# Patient Record
Sex: FEMALE | Race: WHITE | NOT HISPANIC OR LATINO | Employment: OTHER | URBAN - METROPOLITAN AREA
[De-identification: names, ages, dates, MRNs, and addresses within clinical notes are randomized per-mention and may not be internally consistent; named-entity substitution may affect disease eponyms.]

---

## 2017-02-14 ENCOUNTER — ALLSCRIPTS OFFICE VISIT (OUTPATIENT)
Dept: OTHER | Facility: OTHER | Age: 82
End: 2017-02-14

## 2017-02-14 DIAGNOSIS — R29.6 REPEATED FALLS: ICD-10-CM

## 2017-02-14 DIAGNOSIS — R26.2 DIFFICULTY IN WALKING, NOT ELSEWHERE CLASSIFIED: ICD-10-CM

## 2017-02-28 ENCOUNTER — GENERIC CONVERSION - ENCOUNTER (OUTPATIENT)
Dept: OTHER | Facility: OTHER | Age: 82
End: 2017-02-28

## 2017-03-22 ENCOUNTER — GENERIC CONVERSION - ENCOUNTER (OUTPATIENT)
Dept: OTHER | Facility: OTHER | Age: 82
End: 2017-03-22

## 2017-04-05 ENCOUNTER — ALLSCRIPTS OFFICE VISIT (OUTPATIENT)
Dept: OTHER | Facility: OTHER | Age: 82
End: 2017-04-05

## 2017-04-21 ENCOUNTER — GENERIC CONVERSION - ENCOUNTER (OUTPATIENT)
Dept: OTHER | Facility: OTHER | Age: 82
End: 2017-04-21

## 2017-07-12 ENCOUNTER — ALLSCRIPTS OFFICE VISIT (OUTPATIENT)
Dept: OTHER | Facility: OTHER | Age: 82
End: 2017-07-12

## 2017-08-22 ENCOUNTER — ALLSCRIPTS OFFICE VISIT (OUTPATIENT)
Dept: OTHER | Facility: OTHER | Age: 82
End: 2017-08-22

## 2017-10-24 ENCOUNTER — ALLSCRIPTS OFFICE VISIT (OUTPATIENT)
Dept: OTHER | Facility: OTHER | Age: 82
End: 2017-10-24

## 2017-11-25 ENCOUNTER — APPOINTMENT (EMERGENCY)
Dept: RADIOLOGY | Facility: HOSPITAL | Age: 82
End: 2017-11-25
Payer: MEDICARE

## 2017-11-25 ENCOUNTER — HOSPITAL ENCOUNTER (EMERGENCY)
Facility: HOSPITAL | Age: 82
Discharge: HOME | End: 2017-11-25
Attending: EMERGENCY MEDICINE | Admitting: EMERGENCY MEDICINE
Payer: MEDICARE

## 2017-11-25 ENCOUNTER — APPOINTMENT (EMERGENCY)
Dept: CT IMAGING | Facility: HOSPITAL | Age: 82
End: 2017-11-25
Payer: MEDICARE

## 2017-11-25 VITALS
HEART RATE: 66 BPM | OXYGEN SATURATION: 97 % | RESPIRATION RATE: 16 BRPM | TEMPERATURE: 98.3 F | DIASTOLIC BLOOD PRESSURE: 63 MMHG | SYSTOLIC BLOOD PRESSURE: 129 MMHG

## 2017-11-25 DIAGNOSIS — T14.8XXA BRUISE: ICD-10-CM
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
## 2017-11-25 DIAGNOSIS — S09.93XA FACIAL INJURY, INITIAL ENCOUNTER: ICD-10-CM

## 2017-11-25 LAB
ALBUMIN SERPL BCP-MCNC: 3.1 G/DL (ref 3.5–5)
ALP SERPL-CCNC: 169 U/L (ref 46–116)
ALT SERPL W P-5'-P-CCNC: 46 U/L (ref 12–78)
ANION GAP BLD CALC-SCNC: 13 MMOL/L (ref 4–13)
ANION GAP SERPL CALCULATED.3IONS-SCNC: 9 MMOL/L (ref 4–13)
AST SERPL W P-5'-P-CCNC: 51 U/L (ref 5–45)
BACTERIA UR QL AUTO: ABNORMAL /HPF
BASOPHILS # BLD AUTO: 0.02 THOUSANDS/ΜL (ref 0–0.1)
BASOPHILS NFR BLD AUTO: 0 % (ref 0–1)
BILIRUB SERPL-MCNC: 0.8 MG/DL (ref 0.2–1)
BILIRUB UR QL STRIP: NEGATIVE
BUN BLD-MCNC: 25 MG/DL (ref 5–25)
BUN SERPL-MCNC: 22 MG/DL (ref 5–25)
CA-I BLD-SCNC: 1.32 MMOL/L (ref 1.12–1.32)
CALCIUM SERPL-MCNC: 10 MG/DL (ref 8.3–10.1)
CHLORIDE BLD-SCNC: 109 MMOL/L (ref 100–108)
CHLORIDE SERPL-SCNC: 108 MMOL/L (ref 100–108)
CK MB SERPL-MCNC: 1.6 % (ref 0–2.5)
CK MB SERPL-MCNC: 5.3 NG/ML (ref 0–5)
CK SERPL-CCNC: 324 U/L (ref 26–192)
CLARITY UR: CLEAR
CO2 SERPL-SCNC: 23 MMOL/L (ref 21–32)
COLOR UR: YELLOW
CREAT BLD-MCNC: 1.4 MG/DL (ref 0.6–1.3)
CREAT SERPL-MCNC: 1.5 MG/DL (ref 0.6–1.3)
EOSINOPHIL # BLD AUTO: 0.04 THOUSAND/ΜL (ref 0–0.61)
EOSINOPHIL NFR BLD AUTO: 1 % (ref 0–6)
ERYTHROCYTE [DISTWIDTH] IN BLOOD BY AUTOMATED COUNT: 12.3 % (ref 11.6–15.1)
GFR SERPL CREATININE-BSD FRML MDRD: 32 ML/MIN/1.73SQ M
GFR SERPL CREATININE-BSD FRML MDRD: 35 ML/MIN/1.73SQ M
GLUCOSE SERPL-MCNC: 85 MG/DL (ref 65–140)
GLUCOSE SERPL-MCNC: 89 MG/DL (ref 65–140)
GLUCOSE UR STRIP-MCNC: NEGATIVE MG/DL
HCT VFR BLD AUTO: 41.7 % (ref 34.8–46.1)
HCT VFR BLD CALC: 44 % (ref 34.8–46.1)
HGB BLD-MCNC: 13.6 G/DL (ref 11.5–15.4)
HGB BLDA-MCNC: 15 G/DL (ref 11.5–15.4)
HGB UR QL STRIP.AUTO: NEGATIVE
KETONES UR STRIP-MCNC: ABNORMAL MG/DL
LEUKOCYTE ESTERASE UR QL STRIP: ABNORMAL
LYMPHOCYTES # BLD AUTO: 1.06 THOUSANDS/ΜL (ref 0.6–4.47)
LYMPHOCYTES NFR BLD AUTO: 15 % (ref 14–44)
MCH RBC QN AUTO: 32.1 PG (ref 26.8–34.3)
MCHC RBC AUTO-ENTMCNC: 32.6 G/DL (ref 31.4–37.4)
MCV RBC AUTO: 98 FL (ref 82–98)
MONOCYTES # BLD AUTO: 0.83 THOUSAND/ΜL (ref 0.17–1.22)
MONOCYTES NFR BLD AUTO: 12 % (ref 4–12)
NEUTROPHILS # BLD AUTO: 5.18 THOUSANDS/ΜL (ref 1.85–7.62)
NEUTS SEG NFR BLD AUTO: 72 % (ref 43–75)
NITRITE UR QL STRIP: NEGATIVE
NON-SQ EPI CELLS URNS QL MICRO: ABNORMAL /HPF
OTHER STN SPEC: ABNORMAL
PCO2 BLD: 23 MMOL/L (ref 21–32)
PH UR STRIP.AUTO: 7 [PH] (ref 4.5–8)
PLATELET # BLD AUTO: 191 THOUSANDS/UL (ref 149–390)
PMV BLD AUTO: 10.5 FL (ref 8.9–12.7)
POTASSIUM BLD-SCNC: 4.7 MMOL/L (ref 3.5–5.3)
POTASSIUM SERPL-SCNC: 5.1 MMOL/L (ref 3.5–5.3)
PROT SERPL-MCNC: 6.3 G/DL (ref 6.4–8.2)
PROT UR STRIP-MCNC: ABNORMAL MG/DL
RBC # BLD AUTO: 4.24 MILLION/UL (ref 3.81–5.12)
RBC #/AREA URNS AUTO: ABNORMAL /HPF
SODIUM BLD-SCNC: 139 MMOL/L (ref 136–145)
SODIUM SERPL-SCNC: 140 MMOL/L (ref 136–145)
SP GR UR STRIP.AUTO: 1.01 (ref 1–1.03)
SPECIMEN SOURCE: ABNORMAL
TROPONIN I SERPL-MCNC: <0.02 NG/ML
UROBILINOGEN UR QL STRIP.AUTO: 0.2 E.U./DL
WBC # BLD AUTO: 7.13 THOUSAND/UL (ref 4.31–10.16)
WBC #/AREA URNS AUTO: ABNORMAL /HPF

## 2017-11-25 PROCEDURE — 70450 CT HEAD/BRAIN W/O DYE: CPT

## 2017-11-25 PROCEDURE — 80047 BASIC METABLC PNL IONIZED CA: CPT

## 2017-11-25 PROCEDURE — 73030 X-RAY EXAM OF SHOULDER: CPT

## 2017-11-25 PROCEDURE — 71250 CT THORAX DX C-: CPT

## 2017-11-25 PROCEDURE — 82550 ASSAY OF CK (CPK): CPT | Performed by: EMERGENCY MEDICINE

## 2017-11-25 PROCEDURE — 72125 CT NECK SPINE W/O DYE: CPT

## 2017-11-25 PROCEDURE — 73060 X-RAY EXAM OF HUMERUS: CPT

## 2017-11-25 PROCEDURE — 80053 COMPREHEN METABOLIC PANEL: CPT | Performed by: EMERGENCY MEDICINE

## 2017-11-25 PROCEDURE — 99284 EMERGENCY DEPT VISIT MOD MDM: CPT

## 2017-11-25 PROCEDURE — 81001 URINALYSIS AUTO W/SCOPE: CPT | Performed by: EMERGENCY MEDICINE

## 2017-11-25 PROCEDURE — 73590 X-RAY EXAM OF LOWER LEG: CPT

## 2017-11-25 PROCEDURE — 84484 ASSAY OF TROPONIN QUANT: CPT | Performed by: EMERGENCY MEDICINE

## 2017-11-25 PROCEDURE — 70486 CT MAXILLOFACIAL W/O DYE: CPT

## 2017-11-25 PROCEDURE — 36415 COLL VENOUS BLD VENIPUNCTURE: CPT | Performed by: EMERGENCY MEDICINE

## 2017-11-25 PROCEDURE — 85014 HEMATOCRIT: CPT

## 2017-11-25 PROCEDURE — 85025 COMPLETE CBC W/AUTO DIFF WBC: CPT | Performed by: EMERGENCY MEDICINE

## 2017-11-25 PROCEDURE — 93005 ELECTROCARDIOGRAM TRACING: CPT | Performed by: EMERGENCY MEDICINE

## 2017-11-25 PROCEDURE — 74176 CT ABD & PELVIS W/O CONTRAST: CPT

## 2017-11-25 PROCEDURE — 82553 CREATINE MB FRACTION: CPT | Performed by: EMERGENCY MEDICINE

## 2017-11-25 RX ORDER — TRAMADOL HYDROCHLORIDE 50 MG/1
25 TABLET ORAL EVERY 6 HOURS PRN
COMMUNITY
End: 2018-02-09 | Stop reason: SDUPTHER

## 2017-11-25 RX ORDER — ZINC OXIDE
OINTMENT (GRAM) TOPICAL AS NEEDED
COMMUNITY
End: 2018-02-09 | Stop reason: SDUPTHER

## 2017-11-25 RX ORDER — ACETAMINOPHEN 325 MG/1
650 TABLET ORAL 2 TIMES DAILY PRN
COMMUNITY
End: 2018-02-09 | Stop reason: SDUPTHER

## 2017-11-25 RX ORDER — LEVOTHYROXINE SODIUM 0.03 MG/1
25 TABLET ORAL DAILY
COMMUNITY
End: 2018-02-09 | Stop reason: SDUPTHER

## 2017-11-25 NOTE — ED PROVIDER NOTES
History  Chief Complaint   Patient presents with    Fall     pt fell yesterday  pt landed on her left side  pt does not remember much of the fall  thinks she may have passed out  pt only remembers trying to get up  pt has head injury, facial injury, left shoulder pain, left rib pain and left hip pain       History provided by:  Patient   used: No    Fall   Associated symptoms: back pain and chest pain    Associated symptoms: no abdominal pain, no headaches, no nausea, no neck pain and no vomiting      Patient is a 51-year-old female presenting to emergency department with trauma  She fell yesterday  Does not remember the fall  Does him getting up  Is unsure what happened  Denies fevers or chills  No nausea or vomiting  Has a headache  Has back pain  Has left-sided chest wall pain  Worse with deep breaths  No abdominal pain  MDM cardiac workup, check urine, trauma scans, re-evaluate        Prior to Admission Medications   Prescriptions Last Dose Informant Patient Reported? Taking? Multiple Vitamin (MULTIVITAMIN) tablet Unknown at Unknown time Child Yes No   Sig: Take 1 tablet by mouth daily  acetaminophen (TYLENOL) 325 mg tablet Unknown at Unknown time Child Yes No   Sig: Take 650 mg by mouth 2 (two) times a day as needed for mild pain   calcium carbonate (OS-CHLOÉ) 600 MG tablet Unknown at Unknown time Child Yes No   Sig: Take 600 mg by mouth daily  calcium-vitamin D (OSCAL 500 + D) 500 mg-200 units per tablet Unknown at Unknown time Child Yes No   Sig: Take 1 tablet by mouth daily  docusate sodium (COLACE) 100 mg capsule Unknown at Unknown time Child Yes No   Sig: Take 100 mg by mouth daily  ferrous sulfate 325 (65 Fe) mg tablet Unknown at Unknown time Child Yes No   Sig: Take 325 mg by mouth daily with breakfast    folic acid (FOLVITE) 738 mcg tablet Unknown at Unknown time Child Yes No   Sig: Take 400 mcg by mouth daily     levothyroxine 25 mcg tablet Unknown at Unknown time Child Yes No   Sig: Take 25 mcg by mouth daily   liver oil-zinc oxide (DESITIN) 40 % ointment Unknown at Unknown time Child Yes No   Sig: Apply topically as needed for irritation   magnesium hydroxide (MILK OF MAGNESIA) 400 mg/5 mL oral suspension Unknown at Unknown time Child Yes No   Sig: Take 30 mL by mouth daily   mirtazapine (REMERON) 15 mg tablet Unknown at Unknown time Child Yes No   Sig: Take 15 mg by mouth daily at bedtime  potassium chloride (K-DUR) 10 mEq tablet Unknown at Unknown time Child Yes No   Sig: Take 10 mEq by mouth 2 (two) times a day  rOPINIRole (REQUIP) 2 mg tablet Unknown at Unknown time Child Yes No   Sig: Take 2 mg by mouth daily at bedtime  silver sulfadiazine (SILVADENE,SSD) 1 % cream Unknown at Unknown time Child Yes No   Sig: Apply topically daily   traMADol (ULTRAM) 50 mg tablet Unknown at Unknown time Child Yes No   Sig: Take 25 mg by mouth every 6 (six) hours as needed for moderate pain   traZODone (DESYREL) 50 mg tablet Unknown at Unknown time Child Yes No   Sig: Take 25 mg by mouth daily at bedtime  Facility-Administered Medications: None       Past Medical History:   Diagnosis Date    Disease of thyroid gland        History reviewed  No pertinent surgical history  History reviewed  No pertinent family history  I have reviewed and agree with the history as documented  Social History   Substance Use Topics    Smoking status: Current Every Day Smoker     Packs/day: 0 20     Types: Cigarettes    Smokeless tobacco: Never Used    Alcohol use 4 2 oz/week     7 Shots of liquor per week        Review of Systems   Constitutional: Negative for chills, diaphoresis and fever  HENT: Negative for congestion and sore throat  Eyes: Negative for photophobia and visual disturbance  Respiratory: Negative for cough, shortness of breath, wheezing and stridor  Cardiovascular: Positive for chest pain  Negative for palpitations and leg swelling     Gastrointestinal: Negative for abdominal pain, blood in stool, diarrhea, nausea and vomiting  Genitourinary: Negative for dysuria, frequency and urgency  Musculoskeletal: Positive for back pain  Negative for neck pain and neck stiffness  Skin: Negative for pallor and rash  Neurological: Negative for dizziness, syncope, weakness, light-headedness and headaches  All other systems reviewed and are negative  Physical Exam  ED Triage Vitals   Temperature Pulse Respirations Blood Pressure SpO2   11/25/17 1439 11/25/17 1438 11/25/17 1438 11/25/17 1438 11/25/17 1438   98 3 °F (36 8 °C) 75 18 121/60 96 %      Temp Source Heart Rate Source Patient Position - Orthostatic VS BP Location FiO2 (%)   11/25/17 1438 11/25/17 1438 11/25/17 1438 11/25/17 1438 --   Oral Monitor Sitting Left arm       Pain Score       11/25/17 1438       Worst Possible Pain           Orthostatic Vital Signs  Vitals:    11/25/17 1438 11/25/17 1530 11/25/17 1628 11/25/17 1630   BP: 121/60 118/56 154/66 129/63   Pulse: 75 66 (!) 110 66   Patient Position - Orthostatic VS: Sitting  Lying        Physical Exam   Constitutional: She is oriented to person, place, and time  She appears well-developed and well-nourished  HENT:   Head: Normocephalic and atraumatic  Eyes: EOM are normal  Pupils are equal, round, and reactive to light  Neck: Normal range of motion  Neck supple  Cardiovascular: Normal rate, regular rhythm, normal heart sounds and intact distal pulses  Pulmonary/Chest: Effort normal and breath sounds normal  No respiratory distress  Abdominal: Soft  Bowel sounds are normal  There is no tenderness  Musculoskeletal:   Full range of motion of all joints, tenderness and bruising over bilateral shins and left shoulder, CT L-spine tenderness, left chest wall tenderness   Neurological: She is alert and oriented to person, place, and time  No cranial nerve deficit or sensory deficit  She exhibits normal muscle tone   Coordination normal    Skin: Skin is warm and dry  Capillary refill takes less than 2 seconds  No rash noted  No erythema  Vitals reviewed        ED Medications  Medications - No data to display    Diagnostic Studies  Results Reviewed     Procedure Component Value Units Date/Time    Urine Microscopic [[id number]]  (Abnormal) Collected:  11/25/17 1701    Lab Status:  Final result Specimen:  Urine from Urine, Clean Catch Updated:  11/25/17 1730     RBC, UA 0-1 (A) /hpf      WBC, UA 0-5 /hpf      Epithelial Cells Occasional /hpf      Bacteria, UA Occasional /hpf      OTHER OBSERVATIONS Transitional Epithelial Cells    UA w Reflex to Microscopic w Reflex to Culture [[id number]]  (Abnormal) Collected:  11/25/17 1701    Lab Status:  Final result Specimen:  Urine from Urine, Clean Catch Updated:  11/25/17 1709     Color, UA Yellow     Clarity, UA Clear     Specific Gravity, UA 1 010     pH, UA 7 0     Leukocytes, UA Trace (A)     Nitrite, UA Negative     Protein, UA Trace (A) mg/dl      Glucose, UA Negative mg/dl      Ketones, UA Trace (A) mg/dl      Urobilinogen, UA 0 2 E U /dl      Bilirubin, UA Negative     Blood, UA Negative    CKMB [[id number]]  (Abnormal) Collected:  11/25/17 1517    Lab Status:  Final result Specimen:  Blood from Arm, Left Updated:  11/25/17 1625     CK-MB Index 1 6 %      CK-MB FRACTION 5 3 (H) ng/mL     CK (with reflex to MB) [[id number]]  (Abnormal) Collected:  11/25/17 1517    Lab Status:  Final result Specimen:  Blood from Arm, Left Updated:  11/25/17 1623     Total  (H) U/L     Comprehensive metabolic panel [[id number]]  (Abnormal) Collected:  11/25/17 1517    Lab Status:  Final result Specimen:  Blood from Arm, Left Updated:  11/25/17 1542     Sodium 140 mmol/L      Potassium 5 1 mmol/L      Chloride 108 mmol/L      CO2 23 mmol/L      Anion Gap 9 mmol/L      BUN 22 mg/dL      Creatinine 1 50 (H) mg/dL      Glucose 89 mg/dL      Calcium 10 0 mg/dL      AST 51 (H) U/L      ALT 46 U/L      Alkaline Phosphatase 169 (H) U/L Total Protein 6 3 (L) g/dL      Albumin 3 1 (L) g/dL      Total Bilirubin 0 80 mg/dL      eGFR 32 ml/min/1 73sq m     Narrative:         National Kidney Disease Education Program recommendations are as follows:  GFR calculation is accurate only with a steady state creatinine  Chronic Kidney disease less than 60 ml/min/1 73 sq  meters  Kidney failure less than 15 ml/min/1 73 sq  meters  Troponin I [[id number]]  (Normal) Collected:  11/25/17 1517    Lab Status:  Final result Specimen:  Blood from Arm, Left Updated:  11/25/17 1542     Troponin I <0 02 ng/mL     Narrative:         [employer] Chemistry analyzer 99% cutoff is > 0 04 ng/mL in network labs    o cTnI 99% cutoff is useful only when applied to patients in the clinical setting of myocardial ischemia  o cTnI 99% cutoff should be interpreted in the context of clinical history, ECG findings and possibly cardiac imaging to establish correct diagnosis  o cTnI 99% cutoff may be suggestive but clearly not indicative of a coronary event without the clinical setting of myocardial ischemia      CBC and differential [[id number]]  (Normal) Collected:  11/25/17 1517    Lab Status:  Final result Specimen:  Blood from Arm, Left Updated:  11/25/17 1527     WBC 7 13 Thousand/uL      RBC 4 24 Million/uL      Hemoglobin 13 6 g/dL      Hematocrit 41 7 %      MCV 98 fL      MCH 32 1 pg      MCHC 32 6 g/dL      RDW 12 3 %      MPV 10 5 fL      Platelets 894 Thousands/uL      Neutrophils Relative 72 %      Lymphocytes Relative 15 %      Monocytes Relative 12 %      Eosinophils Relative 1 %      Basophils Relative 0 %      Neutrophils Absolute 5 18 Thousands/µL      Lymphocytes Absolute 1 06 Thousands/µL      Monocytes Absolute 0 83 Thousand/µL      Eosinophils Absolute 0 04 Thousand/µL      Basophils Absolute 0 02 Thousands/µL     POCT Chem 8+ [[id number]]  (Abnormal) Collected:  11/25/17 1524    Lab Status:  Final result Updated:  11/25/17 1527     SODIUM, I-STAT 139 mmol/l Potassium, i-STAT 4 7 mmol/L      Chloride, istat 109 (H) mmol/L      CO2, i-STAT 23 mmol/L      Anion Gap, Istat 13 mmol/L      Calcium, Ionized i-STAT 1 32 mmol/L      BUN, I-STAT 25 mg/dl      Creatinine, i-STAT 1 4 (H) mg/dl      eGFR 35 ml/min/1 73sq m      Glucose, i-STAT 85 mg/dl      Hct, i-STAT 44 %      Hgb, i-STAT 15 0 g/dl      Specimen Type VENOUS                 CT chest abdomen pelvis wo contrast   Final Result by Faizan Hernandez MD (11/25 1630)      No evidence for an acute thoracic, abdominal, or pelvic injury  No focal airspace consolidation  Sequela prior trauma with chronic sacral fractures and remote healing rib fractures  Bilateral medullary nephrocalcinosis  Workstation performed: [id number]         CT cervical spine without contrast   Final Result by Faizan Hernandez MD (11/25 1621)      No acute fracture or traumatic listhesis  Moderate cervical spondylosis  Workstation performed: [id number]         CT facial bones without contrast   Final Result by Faizan Hernandez MD (11/25 1618)      No acute maxillofacial fracture  Workstation performed: [id number]         CT head without contrast   Final Result by Faizan Hernandez MD (11/25 1615)      No acute intracranial abnormality  Moderate generalized volume loss with chronic microvascular ischemia  Workstation performed: [id number]         XR shoulder 2+ views LEFT    (Results Pending)   XR humerus LEFT    (Results Pending)   XR tibia fibula 2 views LEFT    (Results Pending)   XR tibia fibula 2 views RIGHT    (Results Pending)        x-rays evaluated independently by me    No acute fractures noted on my evaluation      Procedures  Procedures       Phone Contacts  ED Phone Contact    ED Course  ED Course as of Nov 25 2333   Sat Nov 25, 2017   1515 ECG shows rate of 67, normal sinus, normal QRS, no significant ST or T-wave changes, normal intervals, EKG interpreted independently by me                                MDM  CritCare Time    Disposition  Final diagnoses:   Fall, initial encounter   Facial injury, initial encounter   Bruise     Time reflects when diagnosis was documented in both MDM as applicable and the Disposition within this note     Time User Action Codes Description Comment    11/25/2017  5:37 PM Cristiane Pacheco Add [C76  [facility]] Fall, initial encounter     11/25/2017  5:37 PM Cristiane Pacheco Add [[id number]] Facial injury, initial encounter     11/25/2017  5:37 PM Cristiane Pacheco Add [S47  [facility]       ED Disposition     ED Disposition Condition Comment    Discharge  Wilber Yap discharge to home/self care  Condition at discharge: Good        Follow-up Information     Follow up With Specialties Details Why Contact Info Additional Information    Libia Carcamo MD Family Medicine In 3 days  [address] [place] [address] [phone]       [address] Emergency Department Emergency Medicine  As needed, If symptoms worsen [address] [address] [phone] [facility], [address], [place], [place], [address]        Discharge Medication List as of 11/25/2017  5:38 PM      CONTINUE these medications which have NOT CHANGED    Details   acetaminophen (TYLENOL) 325 mg tablet Take 650 mg by mouth 2 (two) times a day as needed for mild pain, Historical Med      calcium carbonate (OS-CHLOÉ) 600 MG tablet Take 600 mg by mouth daily  , Historical Med      calcium-vitamin D (OSCAL 500 + D) 500 mg-200 units per tablet Take 1 tablet by mouth daily  , Historical Med      docusate sodium (COLACE) 100 mg capsule Take 100 mg by mouth daily  , Historical Med      ferrous sulfate 325 (65 Fe) mg tablet Take 325 mg by mouth daily with breakfast , Historical Med      folic acid (FOLVITE) 927 mcg tablet Take 400 mcg by mouth daily  , Historical Med      levothyroxine 25 mcg tablet Take 25 mcg by mouth daily, Historical Med      liver oil-zinc oxide (DESITIN) 40 % ointment Apply topically as needed for irritation, Historical Med      magnesium hydroxide (MILK OF MAGNESIA) 400 mg/5 mL oral suspension Take 30 mL by mouth daily, Historical Med      mirtazapine (REMERON) 15 mg tablet Take 15 mg by mouth daily at bedtime  , Historical Med      Multiple Vitamin (MULTIVITAMIN) tablet Take 1 tablet by mouth daily  , Historical Med      potassium chloride (K-DUR) 10 mEq tablet Take 10 mEq by mouth 2 (two) times a day , Historical Med      rOPINIRole (REQUIP) 2 mg tablet Take 2 mg by mouth daily at bedtime  , Historical Med      silver sulfadiazine (SILVADENE,SSD) 1 % cream Apply topically daily, Historical Med      traMADol (ULTRAM) 50 mg tablet Take 25 mg by mouth every 6 (six) hours as needed for moderate pain, Historical Med      traZODone (DESYREL) 50 mg tablet Take 25 mg by mouth daily at bedtime  , Historical Med           No discharge procedures on file      ED Provider  Electronically Signed by           Shanna Covington MD  11/25/17 0571

## 2017-11-25 NOTE — ED NOTES
Pt given juice and cheese to eat, ok'ed by provider  Pt awaiting UA results       Tuyet Damon RN  11/25/17 5447

## 2017-11-25 NOTE — ED NOTES
Pt aware urine sample is needed and will provide specimen when able         Marley Camejo RN  11/25/17 9488

## 2017-11-25 NOTE — DISCHARGE INSTRUCTIONS
Please see use Tylenol for pain  Fall Prevention   WHAT YOU NEED TO KNOW:   Fall prevention includes ways to make your home and other areas safer  It also includes ways you can move more carefully to prevent a fall  Health conditions that cause changes in your blood pressure, vision, or muscle strength and coordination may increase your risk for falls  Medicines may also increase your risk for falls if they make you dizzy, weak, or sleepy  DISCHARGE INSTRUCTIONS:   Call 911 or have someone else call if:   · You have fallen and are unconscious  · You have fallen and cannot move part of your body  Contact your healthcare provider if:   · You have fallen and have pain or a headache  · You have questions or concerns about your condition or care  Fall prevention tips:   · Stand or sit up slowly  This may help you keep your balance and prevent falls  · Use assistive devices as directed  Your healthcare provider may suggest that you use a cane or walker to help you keep your balance  You may need to have grab bars put in your bathroom near the toilet or in the shower  · Wear shoes that fit well and have soles that   Wear shoes both inside and outside  Use slippers with good   Do not wear shoes with high heels  · Wear a personal alarm  This is a device that allows you to call 911 if you fall and need help  Ask your healthcare provider for more information  · Stay active  Exercise can help strengthen your muscles and improve your balance  Your healthcare provider may recommend water aerobics or walking  He or she may also recommend physical therapy to improve your coordination  Never start an exercise program without talking to your healthcare provider first      · Manage your medical conditions  Keep all appointments with your healthcare providers  Visit your eye doctor as directed  Home safety tips:   · Add items to prevent falls in the bathroom    Put nonslip strips on your bath or shower floor to prevent you from slipping  Use a bath mat if you do not have carpet in the bathroom  This will prevent you from falling when you step out of the bath or shower  Use a shower seat so you do not need to stand while you shower  Sit on the toilet or a chair in your bathroom to dry yourself and put on clothing  This will prevent you from losing your balance from drying or dressing yourself while you are standing  · Keep paths clear  Remove books, shoes, and other objects from walkways and stairs  Place cords for telephones and lamps out of the way so that you do not need to walk over them  Tape them down if you cannot move them  Remove small rugs  If you cannot remove a rug, secure it with double-sided tape  This will prevent you from tripping  · Install bright lights in your home  Use night lights to help light paths to the bathroom or kitchen  Always turn on the light before you start walking  · Keep items you use often on shelves within reach  Do not use a step stool to help you reach an item  · Paint or place reflective tape on the edges of your stairs  This will help you see the stairs better  Follow up with your healthcare provider as directed:  Write down your questions so you remember to ask them during your visits  © 2017 [address] [employer] Information is for End User's use only and may not be sold, redistributed or otherwise used for commercial purposes  All illustrations and images included in CareNotes® are the copyrighted property of A [employer]  or Feliberto Wilcox  The above information is an  only  It is not intended as medical advice for individual conditions or treatments  Talk to your doctor, nurse or pharmacist before following any medical regimen to see if it is safe and effective for you

## 2017-11-25 NOTE — ED NOTES
Patient resting comfortably on stretcher  Call bell within reach  VSS  Will continue to monitor        [facility]  11/25/17 1747

## 2017-11-25 NOTE — ED NOTES
Pt requesting something to drink  Pt NPO at this time, per provider       Hollie Duenas RN  11/25/17 3642

## 2017-11-26 LAB
ATRIAL RATE: 67 BPM
P AXIS: 81 DEGREES
PR INTERVAL: 200 MS
QRS AXIS: 58 DEGREES
QRSD INTERVAL: 60 MS
QT INTERVAL: 388 MS
QTC INTERVAL: 409 MS
T WAVE AXIS: 81 DEGREES
VENTRICULAR RATE: 67 BPM

## 2017-11-27 ENCOUNTER — GENERIC CONVERSION - ENCOUNTER (OUTPATIENT)
Dept: OTHER | Facility: OTHER | Age: 82
End: 2017-11-27

## 2017-11-28 ENCOUNTER — ALLSCRIPTS OFFICE VISIT (OUTPATIENT)
Dept: OTHER | Facility: OTHER | Age: 82
End: 2017-11-28
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
## 2018-01-10 NOTE — PROGRESS NOTES
Assessment    1  Ambulatory dysfunction (719 7) (R26 2)   2  Multiple falls (V15 88) (R29 6)    Plan    1  Occupational Therapy Referral Other Physician Referral  Consult  Status: Active    Requested for: [id number]  are Referring to a non-SL Preferred Provider : Established Patient  Care Summary provided  : Yes   2  Physical Therapy Referral Other Physician Referral  Consult  Status: Active  Requested   for: [id number]  are Referring to a non-SL Preferred Provider : Established Patient  Care Summary provided  : Yes    Discussion/Summary  Discussion Summary:   1 ) Ambulatory dysfunction - using rolator  Patient had fall x2 this year  WIll consult PT/OT  2 ) Multiple falls - hit head w/this fall  Nursing to monitor nuero checks qshift x3days, notify if any change in MS  Monitor bp weekly for a month, then will reassess  Not currently on BP meds, but ?if hypotension is contributing to fall? Encourage fluid intake  Chief Complaint  Chief Complaint Free Text Note Form: S/P fall 02/12/17- Fall was not reported until today  Pt hit her head from the fall  c/o soreness today while at [address] request script for PT      History of Present Illness  HPI: Mrs Denver Bonilla presents after fall on Sunday night that resulted in hitting head  She denies LOC  She denied any dizziness or lightheadedness at the time  She is unsure why she fell - states she go up to go make her nightly cocktail and before she knew it had fallen to the floor  She states she did hit the back of her head, but does not know what she hit it on  Patient states she had a similar fall last month resulted in hurt left ring finger    Mrs Denver Bonilla states she has history of falls, has had multiple work ups d/t falls and has been followed by PT in the past  Patient denies any concerns currently, only came for visit because daughter and nurses recommended it        Review of Systems  Complete-Female:   Constitutional: No fever, no chills, feels well, no tiredness, no recent weight gain or weight loss  Eyes: no eye pain and no eyesight problems  ENT: no hearing loss  Cardiovascular: No complaints of slow heart rate, no fast heart rate, no chest pain, no palpitations, no leg claudication, no lower extremity edema  Respiratory: No complaints of shortness of breath, no wheezing, no cough, no SOB on exertion, no orthopnea, no PND  Gastrointestinal: No complaints of abdominal pain, no constipation, no nausea or vomiting, no diarrhea, no bloody stools  Genitourinary: No complaints of dysuria, no incontinence, no pelvic pain, no dysmenorrhea, no vaginal discharge or bleeding  Neurological: difficulty walking and chronic forgetfulness, chronic gait dysturbance, but no headache, no dizziness and no limb weakness  Psychiatric: Not suicidal, no sleep disturbance, no anxiety or depression, no change in personality, no emotional problems  Endocrine: No complaints of proptosis, no hot flashes, no muscle weakness, no deepening of the voice, no feelings of weakness  Geriatric Syndrome Rsoe Stank:   gait is abnormal    Geriatrics Fall, Nutrition, Mobility, Neuropsychological Assessment [facility] [place] [place]: The most recent fall occurred 2 day(s) ago  The fall resulted from unsure of mechanism  The fall was preceded by no known event  The fall resulted in abrasion(s) to back of scalp  A referral was made for PT/OT  Active Problems    1  Abrasion of right upper extremity, initial encounter (912 0) (S40 811A)   2  Acute pain of right shoulder (719 41) (M25 511)   3  Back pain (724 5) (M54 9)   4  Back pain (724 5) (M54 9)   5  Chronic kidney disease, stage III (moderate) (585 3) (N18 3)   6  Closed fracture of one rib of left side, initial encounter (807 01) (S22 32XA)   7  Cognitive impairment (294 9) (R41 89)   8  Constipation (564 00) (K59 00)   9  Cough (786 2) (R05)   10  Depression (311) (F32 9)   11  Diarrhea (787 91) (R19 7)   12   DNR (do not resuscitate) discussion (V65 49) (Z71 89)   13  Fall, initial encounter (E888 9) (W19 XXXA)   14  GERD (gastroesophageal reflux disease) (530 81) (K21 9)   15  Hearing loss (389 9) (H91 90)   16  History of urinary frequency (V13 09) (Z87 898)   17  Hypokalemia (276 8) (E87 6)   18  Hypothyroidism (244 9) (E03 9)   19  Insomnia (780 52) (G47 00)   20  Iron deficiency anemia (280 9) (D50 9)   21  Laceration of chin without complication, initial encounter (873 44) (S01 81XA)   22  Loss of weight (783 21) (R63 4)   23  Mixed incontinence (788 33) (N39 46)   24  Multiple falls (V15 88) (R29 6)   25  Muscle contusion (924 9) (T14 8)   26  Nonspecific abnormal results of function study of thyroid (794 5) (R94 6)   27  Osteoarthritis (715 90) (M19 90)   28  Osteoporosis (733 00) (M81 0)   29  Physician orders for life-sustaining treatment (POLST) form indicates patient wish for    do-not-resuscitate status (V49 86) (Z66)   30  Polydipsia (783 5) (R63 1)   31  Polyuria (788 42) (R35 8)   32  Protein-calorie malnutrition (263 9) (E46)   33  Right shoulder pain (719 41) (M25 511)   34  RLS (restless legs syndrome) (333 94) (G25 81)   35  Skin irritation (709 9) (R23 8)   36  Strain of knee, right, initial encounter (844 9) (S86 911A)   37  Wrist pain, left (719 43) (M25 532)    Past Medical History    1  History of Closed displaced fracture of acromial end of right clavicle with routine healing   (V54 19) (S42 031D)   2  History of Endometrial cancer (182 0) (C54 1)   3  History of urinary frequency (V13 09) (Z87 898)   4  History of Left hip pain (719 45) (M25 552)   5  History of MCI (mild cognitive impairment) (331 83) (G31 84)   6  History of Osteoarthritis of hand (715 94) (M19 049)   7  History of Subclinical hypothyroidism (244 8) (E03 9)   8  History of Urinary incontinence (788 30) (R32)    Surgical History    1  History of Hysterectomy   2  History of Reported Hx Of Hip Replacement - Right Side    Family History  Mother    1   Family history of Breast cancer   2  Family history of Diabetes  Father    3  Family history of Multiple sclerosis  Uncle    4  Family history of Diabetes    Social History    · Alcohol use   · Current every day smoker (305 1) (F17 200)   · Daily caffeine consumption   · [employer] / community college student   ·   Social History Reviewed: The social history was reviewed and updated today  The social history was reviewed and is unchanged  Current Meds   1  Acetaminophen 325 MG Oral Tablet; take 2 tablet by mouth twice a day PRN; Therapy: (Recorded:03Oct2016) to Recorded   2  Calcium 600 MG Oral Tablet; TAKE 1 TABLET DAILY; Therapy: 07YFN0507 to (Evaluate:01Jan2017)  Requested for: 57ZQN7347; Last   MJ:09WKC2624 Ordered   3  Desitin 13 % External Cream; APPLY AS NEEDED FOR PROTECTION PRN; Therapy: (Recorded:03Oct2016) to Recorded   4   MG Oral Capsule; take 1 tablet by mouth daily; Therapy: (Bailey Dao) to Recorded   5  Donepezil HCl - 10 MG Oral Tablet; take one tablet at bedtime; Therapy: 19SDB9131 to (Evaluate:10Jun2017)  Requested for: 60TES9984; Last   Rx:10Feb2017; Status: ACTIVE - Retrospective By Protocol Authorization Ordered   6  Ferrous Sulfate 325 (65 Fe) MG Oral Tablet Delayed Release; take one tablet at bedtime; Therapy: 09VAQ7694 to (Mary Stoverer)  Requested for: 82ZEI2223; Last   Rx:29Jan2016 Ordered   7  Folic Acid 498 MCG Oral Tablet; TAKE 1 TABLET DAILY AS DIRECTED; Therapy: 66Krn5875 to (Evaluate:07Jan2017)  Requested for: 34FVD6880; Last   Rx:43Ptx4426 Ordered   8  Levothyroxine Sodium 25 MCG Oral Tablet; Take one daily in the morning one hour   before breakfast;   Therapy: 56XFF3982 to (Last Rx:10Feb2017)  Requested for: 13EVH1840; Status:   ACTIVE - Retrospective By Protocol Authorization Ordered   9  Memantine HCl - 10 MG Oral Tablet; TAKE ONE TABLET BY MOUTH ONCE DAILY;    Therapy: 78LXP7462 to (Evaluate:07Jan2017)  Requested for: 30JAH4076; Last Rx: 59GPX0127 Ordered   10  Milk of Magnesia SUSP; ADMINISTER 30ML PO PRN ONE TIME DAILY; Therapy: (Ilsa Webber) to Recorded   11  Mirtazapine 15 MG Oral Tablet; take one tablet at bedtime; Therapy: 38OXK7539 to (Evaluate:12May2017)  Requested for: 66TMS1161; Last    Rx:12Jan2017 Ordered   12  Multi Vitamin Daily Oral Tablet; TAKE 1 TABLET DAILY; Therapy: 53Xhs7587 to (Evaluate:06Apr2017)  Requested for: 12Apr2016; Last    Rx:11Apr2016 Ordered   13  Potassium Chloride ER 10 MEQ Oral Capsule Extended Release; take 2 capsules daily    as directed; Therapy: 17UTQ7439 to (Sandra Tang)  Requested for: 82ONW1112; Last    Rx:17Ybo6536 Ordered   14  ROPINIRole HCl - 2 MG Oral Tablet; TAKE ONE TABLET AT ENGPRBH44;    Therapy: 74TPC9175 to (Evaluate:12May2017)  Requested for: 47SKP7811; Last    Rx:12Jan2017 Ordered   15  TraMADol HCl - 50 MG Oral Tablet; TAKE 0 5 TABLET EVERY 6HRS PRN; Therapy: 83Rdm8856 to (Last Rx:12Msh6801) Ordered   16  TraZODone HCl - 50 MG Oral Tablet; Take 1/2 tablet daily at bedtime; Therapy: 95SZE0519 to (Evaluate:10Jun2017)  Requested for: 34NRS2707; Last    Rx:20Azz6615; Status: ACTIVE - Retrospective By Protocol Authorization Ordered   17  Vitamin C TABS; TAKE  2 GUMMIES(125MG) DAILY; Therapy: (Recorded:89Nfl7410) to Recorded    Allergies    1  Omeprazole CPDR   2  Megace ES SUSP    Vitals  Signs    Temperature: 97 7 F  Heart Rate: 60, R Radial  Respiration: 16  Systolic: 070, RUE, Sitting  Diastolic: 60, RUE, Sitting  Height: 5 ft 1 in  Weight: 93 lb 0 48 oz  BMI Calculated: 17 58  BSA Calculated: 1 36  O2 Saturation: 93    Physical Exam  General Multi-System Exam [facility]:   Constitutional   General appearance: No acute distress, well appearing and well nourished  Eyes   Conjunctiva and lids: No erythema, swelling or discharge  Pupils and irises: Equal, round, reactive to light      Ears, Nose, Mouth, and Throat   Conversational Hearing: Normal  Pulmonary   Respiratory effort: No increased work of breathing or signs of respiratory distress  Auscultation of lungs: Clear to auscultation  Cardiovascular   Palpation of heart: Normal PMI, no thrills  Musculoskeletal   Gait and station: Normal   uses walker  Range of motion: Normal     Stability: Normal     Muscle strength/tone: Normal     Neurologic   Cranial nerves: Cranial nerves 2-12 intact  Coordination: Normal finger to nose and heel to shin  Psychiatric   Orientation to person, place and time: Normal     Recent and remote memory: Abnormal   forgetful  Results/Data  PHQ-2 Adult Depression Screening 27OHC6348 11:12AM User, [employer]     Test Name Result Flag Reference   PHQ-2 Adult Depression Score 0     Over the last two weeks, how often have you been bothered by any of the following problems? Little interest or pleasure in doing things: Not at all - 0  Feeling down, depressed, or hopeless: Not at all - 0   PHQ-2 Adult Depression Screening Negative       Falls Risk Assessment (Dx Z13 89 Screen for Neurologic Disorder) 01YQD5584 11:11AM User, [employer]     Test Name Result Flag Reference   Falls Risk      Falls with injury in the past year       Future Appointments    Date/Time Provider Specialty Site   03/22/2017 11:00 AM CAROLYNN Del Rosario  Family Medicine [address] [facility] OFFICE     Signatures   Electronically signed by : Clay Nageotte, [address] [place] [address]; Feb 14 2017 12:09PM EST                       (Author)    Electronically signed by :  CAROLYNN Wooten ; Feb 15 2017 11:45AM EST

## 2018-01-10 NOTE — RESULT NOTES
Message   Adi morgan: Elevated alk phos  Slightly up Creat  TSH same  Follow at [address]  Verified Results  (1) COMPREHENSIVE METABOLIC PANEL 99COB3485 73:37PQ Yovanny Sofia   National Kidney Disease Education Program recommendations are as follows:  GFR calculation is accurate only with a steady state creatinine  Chronic Kidney disease less than 60 ml/min/1 73 sq  meters  Kidney failure less than 15 ml/min/1 73 sq  meters  Test Name Result Flag Reference   GLUCOSE,RANDM 78 mg/dL     If the patient is fasting, the ADA then defines impaired fasting glucose as > 100 mg/dL and diabetes as > or equal to 123 mg/dL  SODIUM 141 mmol/L  136-145   POTASSIUM 4 2 mmol/L  3 5-5 3   CHLORIDE 110 mmol/L H 100-108   CARBON DIOXIDE 25 mmol/L  21-32   ANION GAP (CALC) 6 mmol/L  4-13   BLOOD UREA NITROGEN 18 mg/dL  5-25   CREATININE 1 52 mg/dL H 0 60-1 30   CALCIUM 9 4 mg/dL  8 3-10 1   BILI, TOTAL 0 42 mg/dL  0 20-1 00   ALK PHOSPHATAS 194 U/L H    ALT (SGPT) 25 U/L  12-78   AST(SGOT) 25 U/L  5-45   ALBUMIN 3 0 g/dL L 3 5-5 0   TOTAL PROTEIN 6 2 g/dL L 6 4-8 2   eGFR Non-African American 32 7 ml/min/1 73sq m       (1) TSH 01CRS0893 09:24AM Yovanny Sofia   Patients undergoing fluorescein dye angiography may retain small amounts of fluorescein in the body for 48-72 hours post procedure  Samples containing fluorescein can produce falsely depressed TSH values  If the patient had this procedure,a specimen should be resubmitted post fluorescein clearance          The recommended reference ranges for TSH during pregnancy are as follows:  First trimester 0 1 to 2 5 uIU/mL  Second trimester  0 2 to 3 0 uIU/mL  Third trimester 0 3 to 3 0 uIU/m     Test Name Result Flag Reference   TSH 5 390 uIU/mL H 0 358-3 740

## 2018-01-11 NOTE — PROGRESS NOTES
Assessment    1  Protein-calorie malnutrition (263 9) (E46)   2  Ambulatory dysfunction (719 7) (R26 2)   3  Multiple falls (V15 88) (R29 6)   4  Insomnia (780 52) (G47 00)   5  Osteoporosis (733 00) (M81 0)   6  Cognitive impairment (294 9) (R41 89)    Discussion/Summary  Discussion Summary:   #1: Protein calorie malnutrition: Patient is continuing to have a slight decline in weight  She does report that she continues to take the shakes, but feels there is no particular problem at this point  #2: Ambulatory dysfunction: She is using a walker, and continues to have some weakness at times  She denies any recent falls  States she is doing relatively well  For the moment, I do not see any indication that there is a significant problem in the short-term  #3: Multiple falls: Patient has had multiple falls  Again, none recently per her report  #4: Insomnia: Patient does have problems getting to sleep, and that is extremely tired in the mornings  For the moment, no changes are recommended secondary to the increased risk of problems with increasing medications for insomnia  #5: Osteoporosis: Stable at the moment  No changes for the moment  Again, medications are going to be a significant problem for her  #6: Cognitive impairment: Mild to moderate  No changes yet  Daughter mentioned after the visit that the patient has had some worsening of memory issues  Still, though, safe in appartment  Chief Complaint  Chief Complaint Free Text Note Form: Pt here for routine visit- Cognitive Imp, CKD3, GERD, Hypothyroidism, protein calorie mal       History of Present Illness  HPI: Patient states she feels quite well at the moment  Denies any problems or issues  With regard to protein calorie malnutrition, patient is down approximately 1 pound  She states she is not sleeping the best  Further, AM is hard for her to get up  Hypothyroid: Has been OK        Review of Systems  Complete-Female:   Constitutional: No fever, no chills, feels well, no tiredness, no recent weight gain or weight loss  Eyes: No complaints of eye pain, no red eyes, no eyesight problems, no discharge, no dry eyes, no itching of eyes  ENT: no complaints of earache, no loss of hearing, no nose bleeds, no nasal discharge, no sore throat, no hoarseness  Cardiovascular: No complaints of slow heart rate, no fast heart rate, no chest pain, no palpitations, no leg claudication, no lower extremity edema  Respiratory: No complaints of shortness of breath, no wheezing, no cough, no SOB on exertion, no orthopnea, no PND  Gastrointestinal: No complaints of abdominal pain, no constipation, no nausea or vomiting, no diarrhea, no bloody stools  Genitourinary: No complaints of dysuria, no incontinence, no pelvic pain, no dysmenorrhea, no vaginal discharge or bleeding  Musculoskeletal: No complaints of arthralgias, no myalgias, no joint swelling or stiffness, no limb pain or swelling  Integumentary: No complaints of skin rash or lesions, no itching, no skin wounds, no breast pain or lump  Neurological: No complaints of headache, no confusion, no convulsions, no numbness, no dizziness or fainting, no tingling, no limb weakness, no difficulty walking  Psychiatric: Not suicidal, no sleep disturbance, no anxiety or depression, no change in personality, no emotional problems  Endocrine: No complaints of proptosis, no hot flashes, no muscle weakness, no deepening of the voice, no feelings of weakness  Hematologic/Lymphatic: No complaints of swollen glands, no swollen glands in the neck, does not bleed easily, does not bruise easily  Active Problems    1  Acute pain of right shoulder (719 41) (M25 511)   2  Ambulatory dysfunction (719 7) (R26 2)   3  Back pain (724 5) (M54 9)   4  Back pain (724 5) (M54 9)   5  Chronic kidney disease, stage III (moderate) (585 3) (N18 3)   6  Cognitive impairment (294 9) (R41 89)   7  Constipation (564 00) (K59 00)   8  Cough (786 2) (R05)   9  Depression (311) (F32 9)   10  Diarrhea (787 91) (R19 7)   11  DNR (do not resuscitate) discussion (V65 49) (Z71 89)   12  Encounter for home safety review for injury prevention (V65 43) (Z71 89)   13  Fall, initial encounter (E888 9) (W19 XXXA)   14  GERD (gastroesophageal reflux disease) (530 81) (K21 9)   15  Hearing loss (389 9) (H91 90)   16  History of urinary frequency (V13 09) (Z87 898)   17  Hypokalemia (276 8) (E87 6)   18  Hypothyroidism (244 9) (E03 9)   19  Insomnia (780 52) (G47 00)   20  Iron deficiency anemia (280 9) (D50 9)   21  Loss of weight (783 21) (R63 4)   22  Mixed incontinence (788 33) (N39 46)   23  Multiple falls (V15 88) (R29 6)   24  Muscle contusion (924 9) (T14 8)   25  Nonspecific abnormal results of function study of thyroid (794 5) (R94 6)   26  Osteoarthritis (715 90) (M19 90)   27  Osteoporosis (733 00) (M81 0)   28  Physician orders for life-sustaining treatment (POLST) form indicates patient wish for    do-not-resuscitate status (V49 86) (Z66)   29  Polydipsia (783 5) (R63 1)   30  Polyuria (788 42) (R35 8)   31  Protein-calorie malnutrition (263 9) (E46)   32  Right shoulder pain (719 41) (M25 511)   33  RLS (restless legs syndrome) (333 94) (G25 81)   34  Short-term memory loss (780 93) (R41 3)   35  Skin irritation (709 9) (R23 8)   36  Strain of knee, right, initial encounter (844 9) (S86 911A)   37  Wrist pain, left (719 43) (M25 532)    Past Medical History    1  History of Abrasion of right upper extremity, initial encounter (912 0) (S40 811A)   2  History of Closed displaced fracture of acromial end of right clavicle with routine healing   (V54 19) (S42 031D)   3  History of Closed fracture of one rib of left side, initial encounter (807 01) (S22 32XA)   4  History of Endometrial cancer (182 0) (C54 1)   5  History of urinary frequency (V13 09) (Z87 898)   6   History of Laceration of chin without complication, initial encounter (873 44) (S01 81XA)   7  History of Left hip pain (719 45) (M25 552)   8  History of MCI (mild cognitive impairment) (331 83) (G31 84)   9  History of Osteoarthritis of hand (715 94) (M19 049)   10  History of Subclinical hypothyroidism (244 8) (E03 9)   11  History of Urinary incontinence (788 30) (R32)    Surgical History    1  History of Hysterectomy   2  History of Reported Hx Of Hip Replacement - Right Side  Surgical History Reviewed: The surgical history was reviewed and updated today  Family History  Mother    1  Family history of Breast cancer   2  Family history of Diabetes  Father    3  Family history of Multiple sclerosis  Uncle    4  Family history of Diabetes  Family History Reviewed: The family history was reviewed and updated today  Social History    · Alcohol use   · Current every day smoker (305 1) (F17 200)   · Daily caffeine consumption   · [employer] / community college student   ·   Social History Reviewed: The social history was reviewed and updated today  The social history was reviewed and is unchanged  Current Meds   1  Acetaminophen 325 MG Oral Tablet; take 2 tablet by mouth twice a day PRN; Therapy: (Recorded:03Oct2016) to Recorded   2  Calcium 600 MG Oral Tablet; TAKE 1 TABLET DAILY; Therapy: 94DBK5829 to (Evaluate:01Jan2017)  Requested for: 91LLD6602; Last   PQ:34LQN3428 Ordered   3  Desitin 13 % External Cream; APPLY AS NEEDED FOR PROTECTION PRN; Therapy: (Recorded:03Oct2016) to Recorded   4   MG Oral Capsule; take 1 tablet by mouth daily; Therapy: (Taniya Whelan) to Recorded   5  Donepezil HCl - 10 MG Oral Tablet; take one tablet at bedtime; Therapy: 95TML3188 to (Evaluate:10Jun2017)  Requested for: 57SGQ6226; Last   Rx:38Xvc7591 Ordered   6  Ferrous Sulfate 325 (65 Fe) MG Oral Tablet Delayed Release; take one tablet at bedtime; Therapy: 90FZN2298 to (Rebecca Soulier)  Requested for: 46JBV4391; Last   Rx:29Jan2016 Ordered   7   Folic Acid 941 MCG Oral Tablet; TAKE 1 TABLET DAILY AS DIRECTED; Therapy: 63Vlc9969 to (Evaluate:13Uso4899)  Requested for: 18IFR5121; Last   Rx:10Mar2017 Ordered   8  Levothyroxine Sodium 25 MCG Oral Tablet; Take one daily in the morning one hour   before breakfast;   Therapy: 57JIK6772 to (Last Rx:00Akd7734)  Requested for: 87QPZ4777 Ordered   9  Memantine HCl - 10 MG Oral Tablet; TAKE ONE TABLET BY MOUTH ONCE DAILY; Therapy: 88SNJ9661 to (Evaluate:07Jan2017)  Requested for: 81BZU1256; Last   Rx:30Ktm3607 Ordered   10  Milk of Magnesia SUSP; ADMINISTER 30ML PO PRN ONE TIME DAILY; Therapy: (Arvella Solo) to Recorded   11  Mirtazapine 15 MG Oral Tablet; take one tablet at bedtime; Therapy: 17SDF1030 to (Evaluate:12May2017)  Requested for: 24PJU4503; Last    Rx:12Jan2017 Ordered   12  Multi Vitamin Daily Oral Tablet; TAKE 1 TABLET DAILY; Therapy: 78Pqv9943 to (Evaluate:06Apr2017)  Requested for: 90Hol5125; Last    Rx:22Vny9336 Ordered   13  Potassium Chloride ER 10 MEQ Oral Capsule Extended Release; TAKE 2 CAPSULES    AS DIRECTED; Therapy: 62OMQ8143 to (Evaluate:56Uws6242)  Requested for: 51AIY0602; Last    Rx:16Mar2017 Ordered   14  ROPINIRole HCl - 2 MG Oral Tablet; TAKE ONE TABLET AT RLGSESR44;    Therapy: 49HGJ3930 to (Evaluate:12May2017)  Requested for: 52MLU0736; Last    Rx:12Jan2017 Ordered   15  TraMADol HCl - 50 MG Oral Tablet; TAKE 0 5 TABLET EVERY 6HRS PRN; Therapy: 85Zgi9332 to (Last Rx:30Bsb3761) Ordered   16  TraZODone HCl - 50 MG Oral Tablet; Take 1/2 tablet daily at bedtime; Therapy: 35UKG5129 to (Evaluate:10Jun2017)  Requested for: 31ZTW4317; Last    Rx:16Vsg5766 Ordered   17  Vitamin C TABS; TAKE  2 GUMMIES(125MG) DAILY; Therapy: (Recorded:81Bev2823) to Recorded  Medication List Reviewed: The medication list was reviewed and updated today  Allergies    1  Omeprazole CPDR   2   Megace ES SUSP    Vitals  Signs   Recorded: 90WYH9591 11:28AM   Heart Rate: 64, R Radial  Respiration: 16  Systolic: 92, RUE, Sitting  Diastolic: 60, RUE, Sitting  Height: 5 ft 1 in  Weight: 92 lb 0 64 oz  BMI Calculated: 17 39  BSA Calculated: 1 35  O2 Saturation: 95    Physical Exam    Constitutional   General appearance: No acute distress, well appearing and well nourished  Head and Face   Head and face: Normal     Ears, Nose, Mouth, and Throat   Conversational Hearing: Normal     Pulmonary   Respiratory effort: No increased work of breathing or signs of respiratory distress  Auscultation of lungs: Clear to auscultation  Cardiovascular   Auscultation of heart: Normal rate and rhythm, normal S1 and S2, no murmurs  Future Appointments    Date/Time Provider Specialty Site   07/05/2017 10:30 AM CAROLYNN [place]  Geriatrics [facility] [place] OFFICE     Signatures   Electronically signed by :  CAROLYNN Aviles ; Apr 5 2017  4:49PM EST                       (Author)

## 2018-01-11 NOTE — PROGRESS NOTES
Assessment  Assessed    1  Hypothyroidism (244 9) (E03 9)   2  Wrist pain, left (719 43) (M25 532)   3  Chronic kidney disease, stage III (moderate) (585 3) (N18 3)   4  DNR (do not resuscitate) discussion (R93 17) (Z14 89)    Plan  Chronic kidney disease, stage III (moderate)    · (1) BASIC METABOLIC PROFILE; Status:Active; Requested for:56Ivx6949;   Hypothyroidism    · Levothyroxine Sodium 25 MCG Oral Tablet; Take one daily in the morning one hour  before breakfast   · (1) TSH; Status:Active; Requested for:66Veq1335 11:30AM;    · Follow-up visit in 6 weeks Evaluation and Treatment  Follow-up  Status: Hold For -  Scheduling  Requested for: 72Tvl6990 11:30AM   · You may slowly resume your normal level of activity once you feel better ;  Status:Complete;   Done: 25WIM7557 11:30AM  Wrist pain, left    · * XR WRIST 3+ VIEW LEFT; Status:Active; Requested for:07Nov2016;     Discussion/Summary  Discussion Summary:   #1: Hypothyroidism: TSH continues to be on the high end of normal  Would recommend strongly that she start Synthroid 25 Âµg, and recheck in 6 weeks  Previously, her TSH was slightly closer to normal, but it has gotten worse  #2: Wrist pain, left: Status post fall  Recommend stat x-ray  Patient is unable to get to a facility to have this done, so we will consider mobile ask for today  #3: Chronic kidney disease, stage III: Creatinine is slightly worse, so I recommended recheck in approximately 6 weeks with increasing water to a certain extent  #4: DO NOT RESUSCITATE: Patient did request DO NOT RESUSCITATE, DO NOT INTUBATE, limited use of antibiotics, no feeding tubes  This was witnessed by her daughter, and we did sign the POLST form today  Medication SE Review and Pt Understands Tx: Possible side effects of new medications were reviewed with the patient/guardian today  The treatment plan was reviewed with the patient/guardian   The patient/guardian understands and agrees with the treatment plan Chief Complaint  Chief Complaint Free Text Note Form: Pt here 1mth f/u-Hypothyroidism  Review BW done 10/04/2016  Report fall in the Am  Landed on right hand  Right wrist currently swollen  Daughter Fabian Pace want to review POLST form while in office today  History of Present Illness  HPI: Please see the chief complaint  Patient did fall today  She is having pain on the wrist  Patient denied any particular issues with regard to the fall, she just noted that she had fallen  There was pain in the left wrist  Swelling and ecchymosis along with this  Happened earlier today  She denies again any issues with it, and did not wish to have further evaluation  POLST: Reviewed with patient and daughter that she wishes to be DO NOT RESUSCITATE, DO NOT INTUBATE, no tube feedings, antibiotics as determined for comfort  POLSTform is signed today  Reviewed hypothyroidism, reviewed nutrition status, reviewed falls, reviewed weakness, reviewed cognitive impairment  All the seem to be fairly stable  Hypothyroidism is somewhat new for the patient, and she has never been on medication for this before  Review of Systems  Complete-Female:   Constitutional: feeling tired, but as noted in HPI  Eyes: No complaints of eye pain, no red eyes, no eyesight problems, no discharge, no dry eyes, no itching of eyes  ENT: no complaints of earache, no loss of hearing, no nose bleeds, no nasal discharge, no sore throat, no hoarseness  Cardiovascular: No complaints of slow heart rate, no fast heart rate, no chest pain, no palpitations, no leg claudication, no lower extremity edema  Respiratory: No complaints of shortness of breath, no wheezing, no cough, no SOB on exertion, no orthopnea, no PND  Gastrointestinal: No complaints of abdominal pain, no constipation, no nausea or vomiting, no diarrhea, no bloody stools     Genitourinary: No complaints of dysuria, no incontinence, no pelvic pain, no dysmenorrhea, no vaginal discharge or bleeding  Musculoskeletal: as noted in HPI  Integumentary: No complaints of skin rash or lesions, no itching, no skin wounds, no breast pain or lump  Neurological: limb weakness  Psychiatric: Not suicidal, no sleep disturbance, no anxiety or depression, no change in personality, no emotional problems  Endocrine: as noted in HPI  Hematologic/Lymphatic: No complaints of swollen glands, no swollen glands in the neck, does not bleed easily, does not bruise easily  Active Problems  Problems    1  Abrasion of right upper extremity, initial encounter (912 0) (S40 811A)   2  Acute pain of right shoulder (719 41) (M25 511)   3  Back pain (724 5) (M54 9)   4  Back pain (724 5) (M54 9)   5  Chronic kidney disease, stage III (moderate) (585 3) (N18 3)   6  Cognitive impairment (294 9) (R41 89)   7  Constipation (564 00) (K59 00)   8  Depression (311) (F32 9)   9  Diarrhea (787 91) (R19 7)   10  Fall, initial encounter (E888 9) (W19 XXXA)   11  GERD (gastroesophageal reflux disease) (530 81) (K21 9)   12  Hearing loss (389 9) (H91 90)   13  History of urinary frequency (V13 09) (Z87 898)   14  Hypokalemia (276 8) (E87 6)   15  Hypothyroidism (244 9) (E03 9)   16  Insomnia (780 52) (G47 00)   17  Iron deficiency anemia (280 9) (D50 9)   18  Laceration of chin without complication, initial encounter (873 44) (S01 81XA)   19  Loss of weight (783 21) (R63 4)   20  Mixed incontinence (788 33) (N39 46)   21  Muscle contusion (924 9) (T14 8)   22  Nonspecific abnormal results of function study of thyroid (794 5) (R94 6)   23  Osteoarthritis (715 90) (M19 90)   24  Osteoporosis (733 00) (M81 0)   25  Polydipsia (783 5) (R63 1)   26  Polyuria (788 42) (R35 8)   27  Protein-calorie malnutrition (263 9) (E46)   28  Right shoulder pain (719 41) (M25 511)   29  RLS (restless legs syndrome) (333 94) (G25 81)   30  Skin irritation (709 9) (R23 8)   31   Strain of knee, right, initial encounter (844 9) (P13 193O)    Past Medical History  Problems    1  History of Closed displaced fracture of acromial end of right clavicle with routine healing   (V54 19) (S42 031D)   2  History of Endometrial cancer (182 0) (C54 1)   3  History of urinary frequency (V13 09) (Z87 898)   4  History of Left hip pain (719 45) (M25 552)   5  History of MCI (mild cognitive impairment) (331 83) (G31 84)   6  History of Osteoarthritis of hand (715 94) (M19 049)   7  History of Subclinical hypothyroidism (244 8) (E03 9)   8  History of Urinary incontinence (788 30) (R32)    Surgical History  Problems    1  History of Hysterectomy   2  History of Reported Hx Of Hip Replacement - Right Side    Family History  Mother    1  Family history of Breast cancer   2  Family history of Diabetes  Father    3  Family history of Multiple sclerosis  Uncle    4  Family history of Diabetes    Social History  Problems    · Alcohol use   · Current every day smoker (305 1) (F17 200)   · Daily caffeine consumption   · [employer] / community college student   ·   Social History Reviewed: The social history was reviewed and updated today  The social history was reviewed and is unchanged  Current Meds   1  Acetaminophen 325 MG Oral Tablet; take 2 tablet by mouth twice a day PRN; Therapy: (Recorded:03Oct2016) to Recorded   2  Calcium 600 MG Oral Tablet; TAKE 1 TABLET DAILY; Therapy: 30YFF9232 to (Evaluate:01Jan2017)  Requested for: 89IND8919; Last   LP:83IVR1778 Ordered   3  Desitin 13 % External Cream; APPLY AS NEEDED FOR PROTECTION PRN; Therapy: (Recorded:03Oct2016) to Recorded   4   MG Oral Capsule; take 1 tablet by mouth daily; Therapy: (Jarocho Ayala) to Recorded   5  Donepezil HCl - 10 MG Oral Tablet; take one tablet at bedtime; Therapy: 03KHR9416 to (Evaluate:13Jan2017)  Requested for: 18SOG0880; Last   Rx:23Emo9583; Status: ACTIVE - Renewal Denied Ordered   6   Ferrous Sulfate 325 (65 Fe) MG Oral Tablet Delayed Release; take one tablet at bedtime; Therapy: 05YSR3976 to (Janes Hughes)  Requested for: 96MVE3647; Last   Rx:29Jan2016 Ordered   7  Folic Acid 798 MCG Oral Tablet; TAKE 1 TABLET DAILY AS DIRECTED; Therapy: 05Ndw4576 to (Evaluate:07Jan2017)  Requested for: 08QJM0035; Last   Rx:88Jvy0592 Ordered   8  Memantine HCl - 10 MG Oral Tablet; TAKE ONE TABLET BY MOUTH ONCE DAILY; Therapy: 36DTG1638 to (Evaluate:07Jan2017)  Requested for: 12BGE9525; Last   Rx:24Ywa9927 Ordered   9  Milk of Magnesia SUSP; ADMINISTER 30ML PO PRN ONE TIME DAILY; Therapy: (Cyrus Hdz) to Recorded   10  Mirtazapine 15 MG Oral Tablet; take one tablet at bedtime; Therapy: 51UMM7656 to (Janes Hughes)  Requested for: 16LTH2745; Last    Rx:29Jan2016 Ordered   11  Multi Vitamin Daily Oral Tablet; TAKE 1 TABLET DAILY; Therapy: 28Xhn5951 to (Evaluate:06Apr2017)  Requested for: 24Asy4343; Last    Rx:62Kgb5945 Ordered   12  Potassium Chloride ER 10 MEQ Oral Capsule Extended Release; take 2 capsules daily    as directed; Therapy: 37MDO6567 to (Jessica Anderson)  Requested for: 13RNL3707; Last    Rx:82Lie0938 Ordered   13  ROPINIRole HCl - 2 MG Oral Tablet; TAKE ONE TABLET AT JCYGCXG16;    Therapy: 51UAZ8591 to (Janes Hughes)  Requested for: 40GAA3707; Last    Rx:29Jan2016 Ordered   14  TraZODone HCl - 50 MG Oral Tablet; Take 1/2 tablet daily at bedtime; Therapy: 05WBW8714 to (Evaluate:95Xrf8256)  Requested for: 06JKG2168; Last    Rx:71Cxl4052; Status: ACTIVE - Renewal Denied Ordered   15  Vitamin C TABS; TAKE  2 GUMMIES(125MG) DAILY; Therapy: (Recorded:54Zmo0589) to Recorded  Medication List Reviewed: The medication list was reviewed and updated today  Allergies  Medication    1  Omeprazole CPDR   2   Megace ES SUSP    Vitals  Signs   Recorded: 34WHS4613 02:58IR   Systolic: 310, RUE, Sitting  Diastolic: 68, RUE, Sitting  Heart Rate: 62, R Radial  Respiration: 16  Temperature: 98 2 F  O2 Saturation: 99  Height: 5 ft 1 in  Weight: 92 lb 0 48 oz  BMI Calculated: 17 39  BSA Calculated: 1 35    Physical Exam  General Multi-System Exam [facility]:   Constitutional   General appearance: No acute distress, well appearing and well nourished  Pulmonary   Respiratory effort: No increased work of breathing or signs of respiratory distress  Auscultation of lungs: Clear to auscultation  Cardiovascular   Auscultation of heart: Normal rate and rhythm, normal S1 and S2, no murmurs  Musculoskeletal   Inspection/palpation of joints, bones, and muscles: Abnormal   (Tenderness of the left wrist, with point tenderness over the carpal bones  Significant ecchymosis over this area as well)        Signatures   Electronically signed by :  CAROLYNN Montgomery ; Nov 7 2016 11:38AM EST                       (Author)

## 2018-01-11 NOTE — MISCELLANEOUS
Message  Pt came to the [facility] to have dressing change on wound L arm   [facility] instructed to apply Silver Sulfadiazine 1% dressing to wound twice daily q94zclh  Order for cream sent to [facility]  Active Problems    1  Acute pain of right shoulder (719 41) (M25 511)   2  Ambulatory dysfunction (719 7) (R26 2)   3  Back pain (724 5) (M54 9)   4  Back pain (724 5) (M54 9)   5  Chronic kidney disease, stage III (moderate) (585 3) (N18 3)   6  Cognitive impairment (294 9) (R41 89)   7  Constipation (564 00) (K59 00)   8  Cough (786 2) (R05)   9  Depression (311) (F32 9)   10  Diarrhea (787 91) (R19 7)   11  DNR (do not resuscitate) discussion (V65 49) (Z71 89)   12  Encounter for home safety review for injury prevention (V65 43) (Z71 89)   13  Fall, initial encounter (E888 9) (W19 XXXA)   14  GERD (gastroesophageal reflux disease) (530 81) (K21 9)   15  Hearing loss (389 9) (H91 90)   16  History of urinary frequency (V13 09) (Z87 898)   17  Hypokalemia (276 8) (E87 6)   18  Hypothyroidism (244 9) (E03 9)   19  Insomnia (780 52) (G47 00)   20  Iron deficiency anemia (280 9) (D50 9)   21  Loss of weight (783 21) (R63 4)   22  Mixed incontinence (788 33) (N39 46)   23  Multiple falls (V15 88) (R29 6)   24  Muscle contusion (924 9) (T14 8)   25  Nonspecific abnormal results of function study of thyroid (794 5) (R94 6)   26  Open wound of left upper arm (880 03) (S41 102A)   27  Osteoarthritis (715 90) (M19 90)   28  Osteoporosis (733 00) (M81 0)   29  Physician orders for life-sustaining treatment (POLST) form indicates patient wish for    do-not-resuscitate status (V49 86) (Z66)   30  Polydipsia (783 5) (R63 1)   31  Polyuria (788 42) (R35 8)   32  Protein-calorie malnutrition (263 9) (E46)   33  Right shoulder pain (719 41) (M25 511)   34  RLS (restless legs syndrome) (333 94) (G25 81)   35  Short-term memory loss (780 93) (R41 3)   36  Skin irritation (709 9) (R23 8)   37   Strain of knee, right, initial encounter (844 9) (J43 907M)   38  Wrist pain, left (089 43) (H78 484)    Current Meds   1  Acetaminophen 325 MG Oral Tablet; take 2 tablet by mouth twice a day PRN; Therapy: (Recorded:03Oct2016) to Recorded   2  Calcium 600 MG Oral Tablet; TAKE 1 TABLET DAILY; Therapy: 53XMG0306 to (Evaluate:01Jan2017)  Requested for: 96XPQ2746; Last   VK:21DNY6563 Ordered   3  Desitin 13 % External Cream; APPLY AS NEEDED FOR PROTECTION PRN; Therapy: (Recorded:03Oct2016) to Recorded   4   MG Oral Capsule; take 1 tablet by mouth daily; Therapy: (Aidankatarzyna Dubons) to Recorded   5  Donepezil HCl - 10 MG Oral Tablet; take one tablet at bedtime; Therapy: 81YNG9505 to (Evaluate:10Jun2017)  Requested for: 69UPS5147; Last   Rx:10Feb2017 Ordered   6  Ferrous Sulfate 325 (65 Fe) MG Oral Tablet Delayed Release; take one tablet at   bedtime; Therapy: 07BNC5460 to (Sharyle Holts)  Requested for: 19WLS2653; Last   Rx:29Jan2016 Ordered   7  Folic Acid 723 MCG Oral Tablet; TAKE 1 TABLET DAILY AS DIRECTED; Therapy: 96Mjr6395 to (Evaluate:08Jul2017)  Requested for: 72RED9396; Last   Rx:10Mar2017 Ordered   8  Levothyroxine Sodium 25 MCG Oral Tablet; Take one daily in the morning one hour   before breakfast;   Therapy: 10PJR7380 to (Last Rx:10Feb2017)  Requested for: 10IJH9699 Ordered   9  Memantine HCl - 10 MG Oral Tablet (Namenda); TAKE ONE TABLET BY MOUTH ONCE   DAILY; Therapy: 09MAY5274 to (Evaluate:07Jan2017)  Requested for: 60HNF8304; Last   Rx:92Aol5381 Ordered   10  Milk of Magnesia SUSP; ADMINISTER 30ML PO PRN ONE TIME DAILY; Therapy: (Aidankatarzyna Robles) to Recorded   11  Mirtazapine 15 MG Oral Tablet; take one tablet at bedtime; Therapy: 77PEN0094 to (Evaluate:12May2017)  Requested for: 08VVE8274; Last    Rx:70Abb8005 Ordered   12  Multi Vitamin Daily Oral Tablet; TAKE 1 TABLET DAILY; Therapy: 31Svc4382 to (Evaluate:06Apr2017)  Requested for: 12Apr2016; Last    Rx:46Rug3352 Ordered   13  Potassium Chloride ER 10 MEQ Oral Capsule Extended Release; TAKE 2 CAPSULES    AS DIRECTED; Therapy: 79WNP3396 to (Evaluate:92Uib5534)  Requested for: 43PRD5365; Last    Rx:16Mar2017 Ordered   14  ROPINIRole HCl - 2 MG Oral Tablet; TAKE ONE TABLET AT EHTQBFA20;    Therapy: 26LWF6806 to (Evaluate:99Aec7014)  Requested for: 54GFH4587; Last    Rx:12Jan2017 Ordered   15  Silver Sulfadiazine 1 % External Cream; APPLY  AND RUB  IN A THIN FILM TO    AFFECTED AREAS TWICE DAILY  (AM AND PM) x 10 days; Therapy: 72Xwa1945 to (Last Rx:21Apr2017)  Requested for: 21Apr2017; Status: ACTIVE    - Retrospective By Protocol Authorization Ordered   16  TraMADol HCl - 50 MG Oral Tablet; TAKE 0 5 TABLET EVERY 6HRS PRN; Therapy: 64Naj0604 to (Last Rx:45Abr6096) Ordered   17  TraZODone HCl - 50 MG Oral Tablet; Take 1/2 tablet daily at bedtime; Therapy: 73WZF5909 to (Evaluate:10Jun2017)  Requested for: 34MXM3771; Last    Rx:14Mzo0809 Ordered   18  Vitamin C TABS; TAKE  2 GUMMIES(125MG) DAILY; Therapy: (Recorded:41Ktn1409) to Recorded    Allergies    1  Omeprazole CPDR   2  Megace ES SUSP    Signatures   Electronically signed by :  CAROLYNN Lucas ; Apr 24 2017  4:55PM EST

## 2018-01-12 VITALS
HEIGHT: 61 IN | SYSTOLIC BLOOD PRESSURE: 92 MMHG | RESPIRATION RATE: 16 BRPM | DIASTOLIC BLOOD PRESSURE: 60 MMHG | HEART RATE: 64 BPM | WEIGHT: 92.04 LBS | OXYGEN SATURATION: 95 % | BODY MASS INDEX: 17.38 KG/M2

## 2018-01-12 NOTE — MISCELLANEOUS
Message  [facility] nurse request Speech Therapy for Short term memory loss and Home safety  Resident had 3 falls since 12/20-02/12/17  Staff also concern with increase confusion  Per Carina Hester verbal ok for Speech Therapy eval order  Active Problems     1  Acute pain of right shoulder (719 41) (M25 511)   2  Back pain (724 5) (M54 9)   3  Back pain (724 5) (M54 9)   4  Chronic kidney disease, stage III (moderate) (585 3) (N18 3)   5  Constipation (564 00) (K59 00)   6  Cough (786 2) (R05)   7  Depression (311) (F32 9)   8  Diarrhea (787 91) (R19 7)   9  DNR (do not resuscitate) discussion (V65 49) (Z71 89)   10  Fall, initial encounter (E888 9) (W19 XXXA)   11  GERD (gastroesophageal reflux disease) (530 81) (K21 9)   12  Hearing loss (389 9) (H91 90)   13  History of urinary frequency (V13 09) (Z87 898)   14  Hypokalemia (276 8) (E87 6)   15  Hypothyroidism (244 9) (E03 9)   16  Iron deficiency anemia (280 9) (D50 9)   17  Loss of weight (783 21) (R63 4)   18  Mixed incontinence (788 33) (N39 46)   19  Muscle contusion (924 9) (T14 8)   20  Nonspecific abnormal results of function study of thyroid (794 5) (R94 6)   21  Osteoarthritis (715 90) (M19 90)   22  Physician orders for life-sustaining treatment (POLST) form indicates patient wish for    do-not-resuscitate status (V49 86) (Z66)   23  Polydipsia (783 5) (R63 1)   24  Polyuria (788 42) (R35 8)   25  Right shoulder pain (719 41) (M25 511)   26  RLS (restless legs syndrome) (333 94) (G25 81)   27  Short-term memory loss (780 93) (R41 3)   28  Skin irritation (709 9) (R23 8)   29  Strain of knee, right, initial encounter (844 9) (S86 911A)   30   Wrist pain, left (719 43) (M25 532)    Osteoporosis (733 00) (M81 0)       Insomnia (780 52) (G47 00)       Cognitive impairment (294 9) (R41 89)       Protein-calorie malnutrition (263 9) (E46)       Laceration of chin without complication, initial encounter (873 44) (S01 81XA)       Abrasion of right upper extremity, initial encounter (912 0) (S40 811A)     - Just inferior to the shoulder  Closed fracture of one rib of left side, initial encounter (807 01) (S22 32XA)       Multiple falls (V15 88) (R29 6)       Ambulatory dysfunction (719 7) (R26 2)       Encounter for home safety review for injury prevention (V65 43) (Z71 89)          Current Meds   1  Acetaminophen 325 MG Oral Tablet; take 2 tablet by mouth twice a day PRN; Therapy: (Recorded:03Oct2016) to Recorded   2  Calcium 600 MG Oral Tablet; TAKE 1 TABLET DAILY; Therapy: 13KEI8921 to (Evaluate:01Jan2017)  Requested for: 53EIF4833; Last   AN:32WDL7452 Ordered   3  Desitin 13 % External Cream; APPLY AS NEEDED FOR PROTECTION PRN; Therapy: (Recorded:03Oct2016) to Recorded   4   MG Oral Capsule; take 1 tablet by mouth daily; Therapy: (Ricardo Byrd) to Recorded   5  Donepezil HCl - 10 MG Oral Tablet; take one tablet at bedtime; Therapy: 78EIQ5631 to (Evaluate:10Jun2017)  Requested for: 53NBX1526; Last   Rx:13Zvv0177 Ordered   6  Ferrous Sulfate 325 (65 Fe) MG Oral Tablet Delayed Release; take one tablet at   bedtime; Therapy: 88TLS8039 to (Irma Sarabia)  Requested for: 40EXG3573; Last   Rx:29Jan2016 Ordered   7  Folic Acid 417 MCG Oral Tablet; TAKE 1 TABLET DAILY AS DIRECTED; Therapy: 45Vlb7344 to (Evaluate:07Jan2017)  Requested for: 45XHF0747; Last   Rx:68Pld5987 Ordered   8  Levothyroxine Sodium 25 MCG Oral Tablet; Take one daily in the morning one hour   before breakfast;   Therapy: 79YPH5784 to (Last Rx:87Auv3774)  Requested for: 99LPG2970 Ordered   9  Memantine HCl - 10 MG Oral Tablet; TAKE ONE TABLET BY MOUTH ONCE DAILY; Therapy: 02HPR6731 to (Evaluate:07Jan2017)  Requested for: 12SKA8293; Last   Rx:07Vos3760 Ordered   10  Milk of Magnesia SUSP; ADMINISTER 30ML PO PRN ONE TIME DAILY; Therapy: (Ricardo Byrd) to Recorded   11  Mirtazapine 15 MG Oral Tablet; take one tablet at bedtime;     Therapy: 25Apr2012 to (Evaluate:12May2017)  Requested for: 32ZTN3291; Last    Rx:12Jan2017 Ordered   12  Multi Vitamin Daily Oral Tablet; TAKE 1 TABLET DAILY; Therapy: 95Ufc4816 to (Evaluate:06Apr2017)  Requested for: 12Apr2016; Last    Rx:11Apr2016 Ordered   13  Potassium Chloride ER 10 MEQ Oral Capsule Extended Release; take 2 capsules daily    as directed; Therapy: 40FLU3857 to (Rhoda Haddad)  Requested for: 03YCK8734; Last    Rx:80Kld3362 Ordered   14  ROPINIRole HCl - 2 MG Oral Tablet; TAKE ONE TABLET AT TBBIJEN85;    Therapy: 92XBN7194 to (Evaluate:12May2017)  Requested for: 13YLR1919; Last    Rx:12Jan2017 Ordered   15  TraMADol HCl - 50 MG Oral Tablet; TAKE 0 5 TABLET EVERY 6HRS PRN; Therapy: 22Vdh4730 to (Last Rx:91Elj5208) Ordered   16  TraZODone HCl - 50 MG Oral Tablet; Take 1/2 tablet daily at bedtime; Therapy: 89TCP3289 to (Evaluate:10Jun2017)  Requested for: 40OWP0930; Last    Rx:78Lfk9743 Ordered   17  Vitamin C TABS; TAKE  2 GUMMIES(125MG) DAILY; Therapy: (Recorded:73Jyq6384) to Recorded    Allergies    1  Omeprazole CPDR   2   Megace ES SUSP    Plan  Short-term memory loss    · Speech Therapy Follow Up Evaluation and Treatment  Follow-up  Status: Complete -  Retrospective By Protocol Authorization  Done: [id number]    Signatures   Electronically signed by : Kamille Damian; May 29 2017  8:08AM EST                       (Author)

## 2018-01-13 VITALS
HEIGHT: 61 IN | OXYGEN SATURATION: 93 % | RESPIRATION RATE: 16 BRPM | BODY MASS INDEX: 17.57 KG/M2 | TEMPERATURE: 97.7 F | WEIGHT: 93.03 LBS | DIASTOLIC BLOOD PRESSURE: 60 MMHG | SYSTOLIC BLOOD PRESSURE: 100 MMHG | HEART RATE: 60 BPM

## 2018-01-13 NOTE — MISCELLANEOUS
Message  Patient had fall over the weekend  She was seen in ER but not admitted  Family would like pt to be an increased care (personal care level) due to ambulatory dysfunction  Okay to stay at assisted living at this time will see patient tomorrow for office visit        Signatures   Electronically signed by : Darion Younger, [facility]; Nov 27 2017  1:02PM EST                       (Author)

## 2018-01-14 NOTE — MISCELLANEOUS
Message  Patient came to the [facility] for a rapid flu testing ordered by Renee Dietrich  Due to c/o chest congestion, body ache and sore throat  Patient denies headache,chills or fever thou she did have low grade temp 99 2 while in office  Vitals /58, HR 70, O2 95%  WC nurse listen to patient lungs prior to and report that her lungs sound clear  Per Dr Renee Dietrich OTC Robitussin -10/5ML  Take 10ml every 4-6 hrys  WC to monitor patient Sx  Active Problems    1  Abrasion of right upper extremity, initial encounter (912 0) (S40 811A)   2  Acute pain of right shoulder (719 41) (M25 511)   3  Back pain (724 5) (M54 9)   4  Back pain (724 5) (M54 9)   5  Chronic kidney disease, stage III (moderate) (585 3) (N18 3)   6  Cognitive impairment (294 9) (R41 89)   7  Constipation (564 00) (K59 00)   8  Depression (311) (F32 9)   9  Diarrhea (787 91) (R19 7)   10  DNR (do not resuscitate) discussion (V65 49) (Z71 89)   11  Fall, initial encounter (E888 9) (W19 XXXA)   12  GERD (gastroesophageal reflux disease) (530 81) (K21 9)   13  Hearing loss (389 9) (H91 90)   14  History of urinary frequency (V13 09) (Z87 898)   15  Hypokalemia (276 8) (E87 6)   16  Hypothyroidism (244 9) (E03 9)   17  Insomnia (780 52) (G47 00)   18  Iron deficiency anemia (280 9) (D50 9)   19  Laceration of chin without complication, initial encounter (873 44) (S01 81XA)   20  Loss of weight (783 21) (R63 4)   21  Mixed incontinence (788 33) (N39 46)   22  Muscle contusion (924 9) (T14 8)   23  Nonspecific abnormal results of function study of thyroid (794 5) (R94 6)   24  Osteoarthritis (715 90) (M19 90)   25  Osteoporosis (733 00) (M81 0)   26  Physician orders for life-sustaining treatment (POLST) form indicates patient wish for    do-not-resuscitate status (V49 86) (Z66)   27  Polydipsia (783 5) (R63 1)   28  Polyuria (788 42) (R35 8)   29  Protein-calorie malnutrition (263 9) (E46)   30  Right shoulder pain (719 41) (M25 511)   31   RLS (restless legs syndrome) (333 94) (G25 81)   32  Skin irritation (709 9) (R23 8)   33  Strain of knee, right, initial encounter (844 9) (S86 911A)   34  Wrist pain, left (779 43) (M25 532)    Current Meds   1  Acetaminophen 325 MG Oral Tablet; take 2 tablet by mouth twice a day PRN; Therapy: (Recorded:03Oct2016) to Recorded   2  Calcium 600 MG Oral Tablet; TAKE 1 TABLET DAILY; Therapy: 86OAQ9668 to (Evaluate:01Jan2017)  Requested for: 65AMZ2313; Last   RF:56ZYO3727 Ordered   3  Desitin 13 % External Cream; APPLY AS NEEDED FOR PROTECTION PRN; Therapy: (Recorded:03Oct2016) to Recorded   4   MG Oral Capsule; take 1 tablet by mouth daily; Therapy: (Nusrat Wyatt) to Recorded   5  Donepezil HCl - 10 MG Oral Tablet; take one tablet at bedtime; Therapy: 56QVZ6961 to (Evaluate:13Jan2017)  Requested for: 76CKC4219; Last   Rx:96Wyg1581; Status: ACTIVE - Renewal Denied Ordered   6  Ferrous Sulfate 325 (65 Fe) MG Oral Tablet Delayed Release; take one tablet at   bedtime; Therapy: 66SCQ6532 to (Anthony Mcmillan)  Requested for: 71PFZ5071; Last   Rx:29Jan2016 Ordered   7  Folic Acid 997 MCG Oral Tablet; TAKE 1 TABLET DAILY AS DIRECTED; Therapy: 62Ckw3338 to (Evaluate:07Jan2017)  Requested for: 99XAS7119; Last   Rx:00Npi0423 Ordered   8  Levothyroxine Sodium 25 MCG Oral Tablet; Take one daily in the morning one hour   before breakfast;   Therapy: 55RHO1276 to (Last Rx:07Nov2016)  Requested for: 30ZMX3214 Ordered   9  Memantine HCl - 10 MG Oral Tablet; TAKE ONE TABLET BY MOUTH ONCE DAILY; Therapy: 46GNM5458 to (Evaluate:07Jan2017)  Requested for: 02WEK5585; Last   Rx:20Fnc1744 Ordered   10  Milk of Magnesia SUSP; ADMINISTER 30ML PO PRN ONE TIME DAILY; Therapy: (Nusrat Wyatt) to Recorded   11  Mirtazapine 15 MG Oral Tablet; take one tablet at bedtime; Therapy: 01TMF2294 to (Anthony Mcmillan)  Requested for: 26AFE2678; Last    Rx:29Jan2016 Ordered   12   Multi Vitamin Daily Oral Tablet; TAKE 1 TABLET DAILY; Therapy: 50Fpi9025 to (Evaluate:06Apr2017)  Requested for: 55Dgy5814; Last    Rx:11Apr2016 Ordered   13  Potassium Chloride ER 10 MEQ Oral Capsule Extended Release; take 2 capsules daily    as directed; Therapy: 14GFH6117 to (Juan Miguel Isis)  Requested for: 83MNX3665; Last    Rx:92Iya9367 Ordered   14  ROPINIRole HCl - 2 MG Oral Tablet; TAKE ONE TABLET AT HPWFGXN52;    Therapy: 36UVJ7721 to (Sharyle Holts)  Requested for: 34AHS4988; Last    Rx:29Jan2016 Ordered   15  TraZODone HCl - 50 MG Oral Tablet; Take 1/2 tablet daily at bedtime; Therapy: 23LYL5375 to (Evaluate:18Lmm4189)  Requested for: 61OFZ4759; Last    Rx:55Mta8864; Status: ACTIVE - Renewal Denied Ordered   16  Vitamin C TABS; TAKE  2 GUMMIES(125MG) DAILY; Therapy: (Recorded:93Uwu6316) to Recorded    Allergies    1  Omeprazole CPDR   2  Megace ES SUSP    Signatures   Electronically signed by :  CAROLYNN Shelby ; Dec 19 2016 10:05AM EST

## 2018-01-15 NOTE — PROGRESS NOTES
Assessment    1  Acute pain of right shoulder (719 41) (M25 511)   2  Abrasion of right upper extremity, initial encounter (912 0) (S40 811A)   3  Osteoporosis (733 00) (M81 0)   4  Cognitive impairment (294 9) (R41 89)   5  Loss of weight (783 21) (R63 4)   6  Fall, initial encounter (E888 9) (W19 XXXA)    Plan    1  * XR SHOULDER 2+ VIEW RIGHT; Status:Active; Requested for:76Khn9235;     2  TraZODone HCl - 50 MG Oral Tablet; Take 1/2 tablet daily at bedtime    3  Megestrol Acetate 625 MG/5ML Oral Suspension (Megace ES); TAKE 5 ML BY   MOUTH ONCE DAILY    Discussion/Summary  Discussion Summary:   #1: Right shoulder pain: Question if patient has a fracture of the humeral head versus the acromion  Has significant tenderness over this area, as well as decreased range of motion  There is no bruising over the area, however  Check stat x-ray  Follow-up after that  #2: Abrasion, right shoulder: Recommend Telfa dressing applied to the area daily  Does appear to have some granulation tissue, and no signs of infection  I elected to not make any changes with regard to treatments to the surface area  #3: Osteoporosis: Patient does have history of osteoporosis  Therefore, fracture is much more likely in this patient  X-ray is a must  She denies any problems with this, showing that she has a significantly decreased insight to what is going on  #4: Cognitive impairment: Patient does have a significant amount of cognitive impairment  This is lead to more falls recently  I'm very concerned about what this will mean for her in the future  No changes yet, though I would have concerns for her in I'll setting versus   Unfortunately, she is likely going to fall in both places  #5: Weight loss: Stable at the moment, however it is only a minimal amount of time that his past  She does exhibit signs of protein calorie malnutrition at this point  #6: Falls: Consider physical therapy        Chief Complaint  Chief Complaint Free Text Note Form: s/p Fall -Pt decline ER visit per [facility] staff that took incident 07/27/16  Pt c/o right side pain in shoulder and back while in office today  Also stated that it hurt to breath  History of Present Illness  HPI: Patient did have an evaluation with the nursing staff on wellness today  They did a full set of vitals, including a pulse ox showing 96% on room air  Patient fell yesterday  She has a significant amount of pain in multiple areas  It is mostly on the right side of her body  "Nothing is broken, I know it "  She does have some difficulty with breathing associated with this  Patient reports that she was using ibuprofen every 4 hours  Initially she stated Anacin, but clarified it mean ibuprofen  Patient was using 2 tablets every 4 hours  With regard to the fall that led to the increased pain, patient denies any particular remembrance of the fall  She does not know how it happened  She stated she was walking along, then noted she was on the floor  She did hit her head  Denies LOC  Was offered to go to the emergency room, however she declined  With regard to the fall, the patient felt that it happened in late afternoon  Could have been evening  She did place ice on her head after the fall  Review of Systems  Complete-Female:   Constitutional: as noted in HPI  Eyes: No complaints of eye pain, no red eyes, no eyesight problems, no discharge, no dry eyes, no itching of eyes  ENT: no complaints of earache, no loss of hearing, no nose bleeds, no nasal discharge, no sore throat, no hoarseness  Cardiovascular: No complaints of slow heart rate, no fast heart rate, no chest pain, no palpitations, no leg claudication, no lower extremity edema  Respiratory: No complaints of shortness of breath, no wheezing, no cough, no SOB on exertion, no orthopnea, no PND  Gastrointestinal: No complaints of abdominal pain, no constipation, no nausea or vomiting, no diarrhea, no bloody stools  Genitourinary: No complaints of dysuria, no incontinence, no pelvic pain, no dysmenorrhea, no vaginal discharge or bleeding  Musculoskeletal: as noted in HPI  Integumentary: as noted in HPI  Active Problems    1  Back pain (724 5) (M54 9)   2  Chronic kidney disease, stage III (moderate) (585 3) (N18 3)   3  Cognitive impairment (294 9) (R41 89)   4  Constipation (564 00) (K59 00)   5  Depression (311) (F32 9)   6  Diarrhea (787 91) (R19 7)   7  GERD (gastroesophageal reflux disease) (530 81) (K21 9)   8  Hearing loss (389 9) (H91 90)   9  History of urinary frequency (V13 09) (Z87 898)   10  Hypokalemia (276 8) (E87 6)   11  Hypothyroidism (244 9) (E03 9)   12  Insomnia (780 52) (G47 00)   13  Iron deficiency anemia (280 9) (D50 9)   14  Laceration of chin without complication, initial encounter (873 44) (S01 81XA)   15  Loss of weight (783 21) (R63 4)   16  Mixed incontinence (788 33) (N39 46)   17  Muscle contusion (924 9) (T14 8)   18  Nonspecific abnormal results of function study of thyroid (794 5) (R94 6)   19  Osteoarthritis (715 90) (M19 90)   20  Osteoporosis (733 00) (M81 0)   21  Polydipsia (783 5) (R63 1)   22  Polyuria (788 42) (R35 8)   23  Protein-calorie malnutrition (263 9) (E46)   24  RLS (restless legs syndrome) (333 94) (G25 81)   25  Strain of knee, right, initial encounter (844 9) (X53 926E)    Past Medical History    1  History of Endometrial cancer (182 0) (C54 1)   2  History of urinary frequency (V13 09) (Z87 898)   3  History of Left hip pain (719 45) (M25 552)   4  History of MCI (mild cognitive impairment) (331 83) (G31 84)   5  History of Osteoarthritis of hand (715 94) (M19 049)   6  History of Subclinical hypothyroidism (244 8) (E03 9)   7  History of Urinary incontinence (788 30) (R32)    Surgical History    1  History of Hysterectomy   2  History of Reported Hx Of Hip Replacement - Right Side  Surgical History Reviewed:    The surgical history was reviewed and updated today  Family History  Mother    1  Family history of Breast cancer   2  Family history of Diabetes  Father    3  Family history of Multiple sclerosis  Uncle    4  Family history of Diabetes  Family History Reviewed: The family history was reviewed and updated today  Social History    · Alcohol use   · Current every day smoker (305 1) (F17 200)   · Daily caffeine consumption   · [employer] / community college student   ·   Social History Reviewed: The social history was reviewed and updated today  The social history was reviewed and is unchanged  Current Meds   1  Calcium 600 MG Oral Tablet; TAKE 1 TABLET DAILY; Therapy: 32NFQ1870 to (Evaluate:01Jan2017)  Requested for: 35QXZ7849; Last   ES:36ZQF8595 Ordered   2  Donepezil HCl - 10 MG Oral Tablet; take one tablet at bedtime; Therapy: 21BPA7700 to (Evaluate:03Sep2016)  Requested for: 01DEV4243; Last   HN:18EDA9164 Ordered   3  Ferrous Sulfate 325 (65 Fe) MG Oral Tablet Delayed Release; take one tablet at bedtime; Therapy: 06YWO0995 to (Jessica Daily)  Requested for: 33DMN5199; Last   Rx:29Jan2016 Ordered   4  Folic Acid 393 MCG Oral Tablet; TAKE 1 TABLET DAILY AS DIRECTED; Therapy: 18See3976 to (Steve Alexis)  Requested for: 25SJI7196; Last   Rx:55Wkh9528 Ordered   5  Megestrol Acetate 625 MG/5ML Oral Suspension; TAKE 5 ML BY MOUTH ONCE DAILY; Therapy: 34CPS7305 to (Evaluate:08Jan2017)  Requested for: 75Edf7492; Last   Rx:92Mex4163; Status: ACTIVE - Retrospective By Protocol Authorization Ordered   6  Memantine HCl - 10 MG Oral Tablet; TAKE ONE TABLET BY MOUTH ONCE DAILY; Therapy: 10EBC2430 to (Evaluate:07Jan2017)  Requested for: 86TGA3722; Last   Rx:18Vce6429 Ordered   7  Mirtazapine 15 MG Oral Tablet; take one tablet at bedtime; Therapy: 70WAB7989 to (Jessica Daily)  Requested for: 78VNV3249; Last   Rx:29Jan2016 Ordered   8  Multi Vitamin Daily Oral Tablet; TAKE 1 TABLET DAILY;    Therapy: 35Swe7234 to (Lonie Spine)  Requested for: 26Mjb3629; Last   Rx:16Wer5369 Ordered   9  Omeprazole 20 MG Oral Capsule Delayed Release; take 1 capsule daily; Therapy: 06VBS0384 to (Rolanda Libman)  Requested for: 06LIS1854; Last   Rx:87Efs5658 Ordered   10  Potassium Chloride ER 10 MEQ Oral Capsule Extended Release; take 2 capsules daily    as directed; Therapy: 79BOW3540 to (Rolanda Libman)  Requested for: 48QAW7908; Last    Rx:76Yrp7275 Ordered   11  ROPINIRole HCl - 2 MG Oral Tablet; TAKE ONE TABLET AT TLIUHCY46;    Therapy: 48BUK3641 to (Everton Jack)  Requested for: 98RLK7663; Last    Rx:05Drn3737 Ordered   12  TraZODone HCl - 50 MG Oral Tablet; Take 1/2 tablet daily at bedtime; Therapy: 85KTG4604 to (Evaluate:10Nov2016)  Requested for: 43Mup1933; Last    Rx:07Pwz9701; Status: ACTIVE - Retrospective By Protocol Authorization Ordered   13  Tylenol 325 MG Oral Tablet Recorded   14  Vitamin D3 1000 UNIT Oral Tablet; Therapy: (Recorded:26Nov2014) to Recorded  Medication List Reviewed: The medication list was reviewed and updated today  Allergies    1  No Known Drug Allergies    Vitals  Signs    Systolic: 520, LUE, Sitting  Diastolic: 70, LUE, Sitting  Heart Rate: 60, L Radial  Temperature: 99 F  Patient Refused Height: Yes  Patient Refused Weight: Yes  Patient Refused Height: Yes  Patient Refused Weight: Yes    Physical Exam  General Multi-System Exam [facility]:   Constitutional   General appearance: Abnormal   (Extremely thin-appearing, very uncomfortable, slightly agitated) well developed, chronically ill, uncomfortable, malodorous, does not smell of feces, does not smell of urine, malnourished, underweight, disheveled clothing, well groomed, not hirsute, appears tired, acutely exhausted and poorly hydrated  Pulmonary   Auscultation of lungs: Clear to auscultation  Musculoskeletal   Gait and station: Abnormal   (Patient in wheelchair  Not walking well   Walking is per report, patient did not walk for myself  Significant tenderness of the right shoulder, especially at the acromion process, as well as the head of the humerus  Significant decreased range of motion in the right shoulder  Left shoulder appears to be normal and without tenderness  Remainder the right side of the body did not appear to have significant tenderness with palpation from shoulder down through right side hips rib cage knee and leg)   Skin   Examination for skin lesions: Abnormal   Skin Lesions:     Psychiatric   Judgment and insight: Abnormal   Judgment: impaired judgment and impaired insight  Future Appointments    Date/Time Provider Specialty Site   08/22/2016 03:00 PM CAROLYNN Sherwood  Family Medicine [address] [place] [address] OFFICE     Signatures   Electronically signed by :  CAROLYNN Duffy ; Jul 28 2016  2:38PM EST                       (Author)

## 2018-01-15 NOTE — PROGRESS NOTES
Assessment    1  Protein-calorie malnutrition (263 9) (E46)   2  RLS (restless legs syndrome) (333 94) (G25 81)   3  Cognitive impairment (294 9) (R41 89)   4  Chronic kidney disease, stage III (moderate) (585 3) (N18 3)   5  Constipation (564 00) (K59 00)    Plan    · Multi Vitamin Daily Oral Tablet; TAKE 1 TABLET DAILY    Discussion/Summary  Discussion Summary:   #1: Protein Calorie Malnutrition: Continue with current treatment  No changes  Encourage PO intake  #2: RLS: Continue with current treatment  No changes  #3: Cog Impairment: Stable  Significant  On aricept  #4: CKD3: No changes  continue current treatment  #5: Constipation: Stable  No changes  Chief Complaint  Chief Complaint Free Text Note Form: Pt here for 2mth visit- Protein Calorie mal,CKD 3,Hypothyrodism  Review lab results  Son merna present at [address] concern with appetite  Mentioned jo-ann Thurs to return Sunday([facility])      History of Present Illness  HPI: As above  Her son is concerned about the weight, and her poor appetite  Review of Systems  Complete-Female:   Constitutional: as noted in HPI  Eyes: No complaints of eye pain, no red eyes, no eyesight problems, no discharge, no dry eyes, no itching of eyes  ENT: no complaints of earache, no loss of hearing, no nose bleeds, no nasal discharge, no sore throat, no hoarseness  Cardiovascular: No complaints of slow heart rate, no fast heart rate, no chest pain, no palpitations, no leg claudication, no lower extremity edema  Respiratory: No complaints of shortness of breath, no wheezing, no cough, no SOB on exertion, no orthopnea, no PND  Gastrointestinal: No complaints of abdominal pain, no constipation, no nausea or vomiting, no diarrhea, no bloody stools  Genitourinary: No complaints of dysuria, no incontinence, no pelvic pain, no dysmenorrhea, no vaginal discharge or bleeding     Musculoskeletal: No complaints of arthralgias, no myalgias, no joint swelling or stiffness, no limb pain or swelling  Integumentary: No complaints of skin rash or lesions, no itching, no skin wounds, no breast pain or lump  Neurological: No complaints of headache, no confusion, no convulsions, no numbness, no dizziness or fainting, no tingling, no limb weakness, no difficulty walking  Psychiatric: Not suicidal, no sleep disturbance, no anxiety or depression, no change in personality, no emotional problems  Endocrine: No complaints of proptosis, no hot flashes, no muscle weakness, no deepening of the voice, no feelings of weakness  Hematologic/Lymphatic: No complaints of swollen glands, no swollen glands in the neck, does not bleed easily, does not bruise easily  Active Problems    1  Back pain (724 5) (M54 9)   2  Chronic kidney disease, stage III (moderate) (585 3) (N18 3)   3  Cognitive impairment (294 9) (R41 89)   4  Constipation (564 00) (K59 00)   5  Depression (311) (F32 9)   6  GERD (gastroesophageal reflux disease) (530 81) (K21 9)   7  Hearing loss (389 9) (H91 90)   8  History of urinary frequency (V13 09) (Z87 898)   9  Hypokalemia (276 8) (E87 6)   10  Hypothyroidism (244 9) (E03 9)   11  Insomnia (780 52) (G47 00)   12  Iron deficiency anemia (280 9) (D50 9)   13  Laceration of chin without complication, initial encounter (873 44) (S01 81XA)   14  Loss of weight (783 21) (R63 4)   15  Mixed incontinence (788 33) (N39 46)   16  Muscle contusion (924 9) (T14 8)   17  Nonspecific abnormal results of function study of thyroid (794 5) (R94 6)   18  Osteoarthritis (715 90) (M19 90)   19  Osteoporosis (733 00) (M81 0)   20  Polydipsia (783 5) (R63 1)   21  Polyuria (788 42) (R35 8)   22  Protein-calorie malnutrition (263 9) (E46)   23  RLS (restless legs syndrome) (333 94) (G25 81)   24  Strain of knee, right, initial encounter (844 9) (F86 430L)    Past Medical History    1  History of Endometrial cancer (182 0) (C54 1)   2   History of urinary frequency (V13 09) (V67 498)   3  History of Left hip pain (719 45) (M25 552)   4  History of MCI (mild cognitive impairment) (331 83) (G31 84)   5  History of Osteoarthritis of hand (715 94) (M19 049)   6  History of Subclinical hypothyroidism (244 8) (E03 9)   7  History of Urinary incontinence (788 30) (R32)    Surgical History    1  History of Hysterectomy   2  History of Reported Hx Of Hip Replacement - Right Side  Surgical History Reviewed: The surgical history was reviewed and updated today  Family History    1  Family history of Breast cancer   2  Family history of Diabetes    3  Family history of Multiple sclerosis    4  Family history of Diabetes  Family History Reviewed: The family history was reviewed and updated today  Social History    · Alcohol use   · Current every day smoker (305 1) (F17 200)   · Daily caffeine consumption   · [employer] / community college student   ·   Social History Reviewed: The social history was reviewed and updated today  The social history was reviewed and is unchanged  Current Meds   1  Calcium 600 MG Oral Tablet; TAKE 1 TABLET DAILY; Therapy: 66KKS6246 to (Parth Pastrana)  Requested for: 66ICT2406; Last   Rx:25Nov2015 Ordered   2  Donepezil HCl - 10 MG Oral Tablet; take one tablet at bedtime; Therapy: 16ZTC6580 to (Radha Desai)  Requested for: 40AZO7331; Last   Rx:08Jan2016 Ordered   3  Ferrous Sulfate 325 (65 Fe) MG Oral Tablet Delayed Release; take one tablet at bedtime; Therapy: 91WUF3964 to (Parth Pastrana)  Requested for: 58GPW4544; Last   Rx:29Jan2016 Ordered   4  Folic Acid 549 MCG Oral Tablet; TAKE 1 TABLET DAILY AS DIRECTED; Therapy: 75Hdk5386 to (Evaluate:65Vwx1292)  Requested for: 99Ltl2769; Last   Rx:47Dyh9991 Ordered   5  Megestrol Acetate 625 MG/5ML Oral Suspension; TAKE 5 ML BY MOUTH ONCE DAILY; Therapy: 69OFF0182 to (05 06 52 16 25)  Requested for: 30PSY1021; Last   Rx:17Kmk7444 Ordered   6   Mirtazapine 15 MG Oral Tablet; take one tablet at bedtime; Therapy: 83XTC8433 to ([facility])  Requested for: 60PYJ5481; Last   Rx:29Jan2016 Ordered   7  Omeprazole 20 MG Oral Capsule Delayed Release; take 1 capsule daily; Therapy: 90OPN3070 to (Evaluate:56Him8426)  Requested for: 15Zer1390; Last   Rx:27Ggd5835 Ordered   8  Potassium Chloride ER 10 MEQ Oral Capsule Extended Release; take 2 capsules daily   as directed; Therapy: 30YUS6712 to ([facility])  Requested for: 96ZLU3747; Last   Rx:08Jan2016 Ordered   9  ROPINIRole HCl - 2 MG Oral Tablet; TAKE ONE TABLET AT [place] [address];   Therapy: 73YHW6194 to ([facility])  Requested for: 21ZVQ7148; Last   Rx:29Jan2016 Ordered   10  Stool Softener 100 MG Oral Capsule; TAKE 1 CAPSULE Daily PRN constipation; Therapy: 33PPJ5921 to (Evaluate:47Glj6023)  Requested for: 92SIW5409; Last    Rx:35Bbu9217 Ordered   11  TraZODone HCl - 50 MG Oral Tablet; Take 1/2 tablet daily at bedtime; Therapy: 29BKE2087 to (Evaluate:11Jun2016)  Requested for: 72TGS3260; Last    Rx:61Qdg2170 Ordered   12  Tylenol 325 MG Oral Tablet Recorded   13  Vitamin D3 1000 UNIT Oral Tablet; Therapy: (Recorded:26Nov2014) to Recorded  Medication List Reviewed: The medication list was reviewed and updated today  Allergies    1  No Known Drug Allergies    Vitals  Signs [Data Includes: Current Encounter]   Recorded: 38Elb0962 03:48PM   Heart Rate: 66, R Radial  Respiration: 18  Systolic: 299, RUE, Sitting  Diastolic: 70, RUE, Sitting  Height: 5 ft 1 in  Weight: 93 lb 0 48 oz  BMI Calculated: 17 58  BSA Calculated: 1 36    Physical Exam    Constitutional   General appearance: No acute distress, well appearing and well nourished           Results/Data  Results   (1) COMPREHENSIVE METABOLIC PANEL [id number] 50:54NF May Quiver   National Kidney Disease Education Program recommendations are as follows:  GFR calculation is accurate only with a steady state creatinine  Chronic Kidney disease less than 60 ml/min/1 73 sq  meters  Kidney failure less than 15 ml/min/1 73 sq  meters  Test Name Result Flag Reference   GLUCOSE,RANDM 78 mg/dL     If the patient is fasting, the ADA then defines impaired fasting glucose as > 100 mg/dL and diabetes as > or equal to 123 mg/dL  SODIUM 141 mmol/L  136-145   POTASSIUM 4 2 mmol/L  3 5-5 3   CHLORIDE 110 mmol/L H 100-108   CARBON DIOXIDE 25 mmol/L  21-32   ANION GAP (CALC) 6 mmol/L  4-13   BLOOD UREA NITROGEN 18 mg/dL  5-25   CREATININE 1 52 mg/dL H 0 60-1 30   CALCIUM 9 4 mg/dL  8 3-10 1   BILI, TOTAL 0 42 mg/dL  0 20-1 00   ALK PHOSPHATAS 194 U/L H    ALT (SGPT) 25 U/L  12-78   AST(SGOT) 25 U/L  5-45   ALBUMIN 3 0 g/dL L 3 5-5 0   TOTAL PROTEIN 6 2 g/dL L 6 4-8 2   eGFR Non-African American 32 7 ml/min/1 73sq m       (1) TSH 58WRK3837 09:24AM May Quiver   Patients undergoing fluorescein dye angiography may retain small amounts of fluorescein in the body for 48-72 hours post procedure  Samples containing fluorescein can produce falsely depressed TSH values  If the patient had this procedure,a specimen should be resubmitted post fluorescein clearance  The recommended reference ranges for TSH during pregnancy are as follows:  First trimester 0 1 to 2 5 uIU/mL  Second trimester  0 2 to 3 0 uIU/mL  Third trimester 0 3 to 3 0 uIU/m     Test Name Result Flag Reference   TSH 5 390 uIU/mL H 0 358-3 740     Signatures   Electronically signed by :  CAROLYNN Grant ; Apr 11 2016  4:18PM EST                       (Author)

## 2018-01-15 NOTE — PROGRESS NOTES
Assessment  Assessed    1  Chronic kidney disease, stage III (moderate) (585 3) (N18 3)   2  Hypothyroidism (244 9) (E03 9)   3  Hypokalemia (276 8) (E87 6)   4  Protein-calorie malnutrition (263 9) (E46)   5  History of Closed displaced fracture of acromial end of right clavicle with routine healing   (V54 19) (S42 031D)    Plan  Chronic kidney disease, stage III (moderate)    · (1) BASIC METABOLIC PROFILE; Status:Active; Requested for:2016;   Cognitive impairment    · Donepezil HCl - 10 MG Oral Tablet; take one tablet at bedtime  Constipation    · Bisac-Evac 10 MG Rectal Suppository  Health Maintenance    · Folic Acid 213 MCG Oral Tablet; TAKE 1 TABLET DAILY AS DIRECTED  Insomnia    · TraZODone HCl - 50 MG Oral Tablet; Take 1/2 tablet daily at bedtime  Loss of weight, Protein-calorie malnutrition    · Megestrol Acetate 625 MG/5ML Oral Suspension (Megace ES)  Protein-calorie malnutrition    · (1) CBC/PLT/DIFF; Status:Active; Requested [id number];    · (1) PREALBUMIN; Status:Active; Requested TPY:15BZU1076;    · (1) TRANSFERRIN; Status:Active; Requested QXL:52QIQ4120; Unlinked    · ArgiMent Oral Packet    Discussion/Summary  Discussion Summary:   #1: CKD 3: Check laboratory studies  Follow-up after this  No changes at the moment  #2: Hypothyroidism: TSH was high last time  Patient has not had the check done recently, so she will go tomorrow and have it done  We can follow-up after that  #3: Hypokalemia: As above, patient needs to have laboratory studies done to confirm that the potassium is doing well  #4: Protein calorie malnutrition: With any of the supplements as she felt they tasted bad  At this point, no further changes are recommended, other than checking labs  This will include transferrin  #5: History of displaced closed fracture of clavicle, right: Ortho felt that this was sufficiently healed, and released her to full normal treatment  Follow-up only if she has problems or changes  Chief Complaint  Chief Complaint Free Text Note Form: Pt here f/u- Right clavicle Fx, Hypothyroidism,Protein Calorie mal, CKD3, Cognitive Imp  Tsh BW still pending  c/o pain B/L foot more so left in the am x few days      History of Present Illness  HPI: Daughter accompanies the patient today for the appointment  She states that she seems to be doing quite well  Patient is reporting that she has some foot pain over the last several days  It is on her left foot  Denies that it is severe  It is a tingling in the heel  Patient did not get blood work done yet  She did state that she would be amenable to having it done tomorrow  With regards to her clavicle fracture, orthopedics has released her at this point  So far, her weight has remained fairly stable  She is not interested in moving to personal care  Review of Systems  Complete-Female:   Constitutional: No fever, no chills, feels well, no tiredness, no recent weight gain or weight loss  Eyes: No complaints of eye pain, no red eyes, no eyesight problems, no discharge, no dry eyes, no itching of eyes  ENT: no complaints of earache, no loss of hearing, no nose bleeds, no nasal discharge, no sore throat, no hoarseness  Cardiovascular: No complaints of slow heart rate, no fast heart rate, no chest pain, no palpitations, no leg claudication, no lower extremity edema  Respiratory: No complaints of shortness of breath, no wheezing, no cough, no SOB on exertion, no orthopnea, no PND  Gastrointestinal: No complaints of abdominal pain, no constipation, no nausea or vomiting, no diarrhea, no bloody stools  Genitourinary: No complaints of dysuria, no incontinence, no pelvic pain, no dysmenorrhea, no vaginal discharge or bleeding  Musculoskeletal: No complaints of arthralgias, no myalgias, no joint swelling or stiffness, no limb pain or swelling  Integumentary: No complaints of skin rash or lesions, no itching, no skin wounds, no breast pain or lump  Neurological: No complaints of headache, no confusion, no convulsions, no numbness, no dizziness or fainting, no tingling, no limb weakness, no difficulty walking  Psychiatric: Not suicidal, no sleep disturbance, no anxiety or depression, no change in personality, no emotional problems  Endocrine: No complaints of proptosis, no hot flashes, no muscle weakness, no deepening of the voice, no feelings of weakness  Hematologic/Lymphatic: No complaints of swollen glands, no swollen glands in the neck, does not bleed easily, does not bruise easily  Active Problems  Problems    1  Abrasion of right upper extremity, initial encounter (912 0) (S40 811A)   2  Acute pain of right shoulder (719 41) (M25 511)   3  Back pain (724 5) (M54 9)   4  Back pain (724 5) (M54 9)   5  Chronic kidney disease, stage III (moderate) (585 3) (N18 3)   6  Cognitive impairment (294 9) (R41 89)   7  Constipation (564 00) (K59 00)   8  Depression (311) (F32 9)   9  Diarrhea (787 91) (R19 7)   10  Fall, initial encounter (E888 9) (W19 XXXA)   11  GERD (gastroesophageal reflux disease) (530 81) (K21 9)   12  Hearing loss (389 9) (H91 90)   13  History of urinary frequency (V13 09) (Z87 898)   14  Hypokalemia (276 8) (E87 6)   15  Hypothyroidism (244 9) (E03 9)   16  Insomnia (780 52) (G47 00)   17  Iron deficiency anemia (280 9) (D50 9)   18  Laceration of chin without complication, initial encounter (873 44) (S01 81XA)   19  Loss of weight (783 21) (R63 4)   20  Mixed incontinence (788 33) (N39 46)   21  Muscle contusion (924 9) (T14 8)   22  Nonspecific abnormal results of function study of thyroid (794 5) (R94 6)   23  Osteoarthritis (715 90) (M19 90)   24  Osteoporosis (733 00) (M81 0)   25  Polydipsia (783 5) (R63 1)   26  Polyuria (788 42) (R35 8)   27  Protein-calorie malnutrition (263 9) (E46)   28  Right shoulder pain (719 41) (M25 511)   29  RLS (restless legs syndrome) (333 94) (G25 81)   30   Skin irritation (709 9) (R23 8) 31  Strain of knee, right, initial encounter (844 9) (I40 551V)    Past Medical History  Problems    1  History of Closed displaced fracture of acromial end of right clavicle with routine healing   (V54 19) (S42 031D)   2  History of Endometrial cancer (182 0) (C54 1)   3  History of urinary frequency (V13 09) (Z87 898)   4  History of Left hip pain (719 45) (M25 552)   5  History of MCI (mild cognitive impairment) (331 83) (G31 84)   6  History of Osteoarthritis of hand (715 94) (M19 049)   7  History of Subclinical hypothyroidism (244 8) (E03 9)   8  History of Urinary incontinence (788 30) (R32)    Surgical History  Problems    1  History of Hysterectomy   2  History of Reported Hx Of Hip Replacement - Right Side  Surgical History Reviewed: The surgical history was reviewed and updated today  Family History  Mother    1  Family history of Breast cancer   2  Family history of Diabetes  Father    3  Family history of Multiple sclerosis  Uncle    4  Family history of Diabetes  Family History Reviewed: The family history was reviewed and updated today  Social History  Problems    · Alcohol use   · Current every day smoker (305 1) (F17 200)   · Daily caffeine consumption   · [employer] / community college student   ·   Social History Reviewed: The social history was reviewed and updated today  The social history was reviewed and is unchanged  Current Meds   1  Acetaminophen 325 MG Oral Tablet; take 2 tablet by mouth twice a day PRN; Therapy: (Recorded:03Oct2016) to Recorded   2  ArgiMent Oral Packet; Apply to mid spine for protection change every 5 days; Therapy: 12Gfq6440 to Recorded   3  Bisac-Evac 10 MG Rectal Suppository; INSERT 1 suppository rectally once daily as   needed; Therapy: (Ilsa Webber) to Recorded   4  Calcium 600 MG Oral Tablet; TAKE 1 TABLET DAILY; Therapy: 59SNQ5364 to (Evaluate:01Jan2017)  Requested for: 77RXA6946; Last   IC:93QNR9627 Ordered   5   Desitin 13 % External Cream; APPLY AS NEEDED FOR PROTECTION PRN; Therapy: (Recorded:03Oct2016) to Recorded   6   MG Oral Capsule; take 1 tablet by mouth daily; Therapy: (Loida Villalobos) to Recorded   7  Donepezil HCl - 10 MG Oral Tablet; take one tablet at bedtime; Therapy: 70YPK0016 to (Evaluate:13Jan2017)  Requested for: 06Pit4694; Last   Rx:66Krh8254; Status: ACTIVE - Retrospective By Protocol Authorization Ordered   8  Ferrous Sulfate 325 (65 Fe) MG Oral Tablet Delayed Release; take one tablet at bedtime; Therapy: 55JMS9031 to (Mesfinmisael Carrasquillo)  Requested for: 36NBS2701; Last   Rx:29Jan2016 Ordered   9  Folic Acid 602 MCG Oral Tablet; TAKE 1 TABLET DAILY AS DIRECTED; Therapy: 13Apr2015 to (Evaluate:07Jan2017)  Requested for: 62EZX5814; Last   Rx:56Mdq6600; Status: ACTIVE - Retrospective By Protocol Authorization Ordered   10  Megestrol Acetate 625 MG/5ML Oral Suspension; TAKE 5 ML BY MOUTH ONCE DAILY; Therapy: 88ZCQ6333 to (Evaluate:08Jan2017)  Requested for: 42Yvz8908; Last    Rx:87Bcm4796 Ordered   11  Memantine HCl - 10 MG Oral Tablet; TAKE ONE TABLET BY MOUTH ONCE DAILY; Therapy: 81ARY7083 to (Evaluate:07Jan2017)  Requested for: 14QKU1973; Last    Rx:54Oto8378 Ordered   12  Milk of Magnesia SUSP; ADMINISTER 30ML PO PRN ONE TIME DAILY; Therapy: (Loida Villalobos) to Recorded   13  Mirtazapine 15 MG Oral Tablet; take one tablet at bedtime; Therapy: 72ZGV7993 to (Mesfinmisael Carrasquillo)  Requested for: 49ZYQ2828; Last    Rx:29Jan2016 Ordered   14  Multi Vitamin Daily Oral Tablet; TAKE 1 TABLET DAILY; Therapy: 34Rbo6990 to (Evaluate:06Apr2017)  Requested for: 26Iqe7904; Last    Rx:62Iai4009 Ordered   15  Potassium Chloride ER 10 MEQ Oral Capsule Extended Release; take 2 capsules daily    as directed; Therapy: 50MPN3548 to (Sandra Cheng)  Requested for: 90VGY9046; Last    Rx:85Fwf5313 Ordered   16   ROPINIRole HCl - 2 MG Oral Tablet; TAKE ONE TABLET AT SDFZSAG92;    Therapy: 74KZL7204 to (Melanie Ringer)  Requested for: 37CES5546; Last    Rx:29Jan2016 Ordered   17  TraZODone HCl - 50 MG Oral Tablet; Take 1/2 tablet daily at bedtime; Therapy: 84XYC2274 to (Evaluate:26Ipq2103)  Requested for: 74Kaa7823; Last    Rx:28Pxe7264; Status: ACTIVE - Retrospective By Protocol Authorization Ordered   18  Vitamin C TABS; TAKE  2 GUMMIES(125MG) DAILY; Therapy: (Recorded:68Ztv2678) to Recorded  Medication List Reviewed: The medication list was reviewed and updated today  Allergies  Medication    1  Omeprazole CPDR   2  Megace ES SUSP    Vitals  Signs   Recorded: 94SPA9427 50:74LS   Systolic: 601, LUE, Sitting  Diastolic: 70, LUE, Sitting  Heart Rate: 62, L Radial  Respiration: 16  Height: 5 ft 1 in  Weight: 89 lb 8 0 oz  BMI Calculated: 16 91  BSA Calculated: 1 34    Physical Exam  General Multi-System Exam [facility]:   Constitutional   General appearance: Abnormal   well developed, appears healthy, comfortable, normal body odor, does not smell of feces, does not smell of urine, malnourished, underweight, clothing appropriate, well groomed, not hirsute, rested and not exhausted  Neck   Thyroid: Normal, no thyromegaly  Pulmonary   Respiratory effort: No increased work of breathing or signs of respiratory distress  Auscultation of lungs: Clear to auscultation  Cardiovascular   Auscultation of heart: Normal rate and rhythm, normal S1 and S2, no murmurs  Carotid pulses: 2+ bilaterally  Examination of extremities for edema and/or varicosities: Normal          Signatures   Electronically signed by :  CAROLYNN Contreras ; Oct  3 2016 11:05AM EST                       (Author)

## 2018-01-16 NOTE — PROGRESS NOTES
Assessment    1  Cognitive impairment (294 9) (R41 89)   2  Chronic kidney disease, stage III (moderate) (585 3) (N18 3)   3  GERD (gastroesophageal reflux disease) (530 81) (K21 9)   4  Hypothyroidism (244 9) (E03 9)   5  Protein-calorie malnutrition (263 9) (E46)   6  Diarrhea (787 91) (R19 7)    Plan    · (1) COMPREHENSIVE METABOLIC PANEL; Status:Active; Requested for:11Aug2016;     · Memantine HCl - 10 MG Oral Tablet (Namenda); TAKE ONE TABLET BY MOUTH  ONCE DAILY    · (1) TSH; Status:Active; Requested for:11Aug2016;    · Follow-up visit in 6 weeks Evaluation and Treatment  Follow-up  Status: Hold For -  Scheduling  Requested for: 11Aug2016    Discussion/Summary  Discussion Summary:   #1: Cognitive impairment: Patient is stable at this point  She is on Aricept at at bedtime, and unfortunate is having diarrhea symptoms at this point  Question if this is some of the cause of it  Would trial Namenda, 10 mg daily  Reevaluate in the future  Can increase to 10 mg twice a day in the near future  At the moment, she should continue on the Aricept at at bedtime  #2: CKD 3: Stable  Check labs  Follow-up after that  #4: GERD: Stable  Patient is not having any reflux symptoms  She is on Prilosec, but really did not notice any changes in her bowels well on the Prilosec  She states she had tried being off of Prilosec previously  I did not make a significant difference  #5: Hypothyroidism: No recent TSH, though the last 2 were slightly elevated  Will recheck and follow-up after that  #6: Protein calorie malnutrition: Patient mentioned that she is not taking any liquids at this point, though in her medical records there does appear to be Megace ES which is liquid  Will need to speak with her daughter to review this with her  #7: Diarrhea: As per #1, question if this is a secondary side effect from the Aricept  Trial of Namenda        Chief Complaint  Chief Complaint Free Text Note Form: Pt here for 3mth routine- CKD3, Depression,cognitive Imp,protein calorie mal  c/o diarrhea in the AM      History of Present Illness  HPI: She has some concerns about diarrhea  She was concerned that the Omeprazole was causing problems in other people  The Prilosec is not causing her problems, but she did note some loose stools today  She mentioned that stool softeners were a problem for her  Reviewed medications  Review of Systems  Complete-Female:   Constitutional: No fever, no chills, feels well, no tiredness, no recent weight gain or weight loss  Eyes: No complaints of eye pain, no red eyes, no eyesight problems, no discharge, no dry eyes, no itching of eyes  ENT: no complaints of earache, no loss of hearing, no nose bleeds, no nasal discharge, no sore throat, no hoarseness  Cardiovascular: No complaints of slow heart rate, no fast heart rate, no chest pain, no palpitations, no leg claudication, no lower extremity edema  Respiratory: No complaints of shortness of breath, no wheezing, no cough, no SOB on exertion, no orthopnea, no PND  Gastrointestinal: as noted in HPI  Genitourinary: No complaints of dysuria, no incontinence, no pelvic pain, no dysmenorrhea, no vaginal discharge or bleeding  Musculoskeletal: No complaints of arthralgias, no myalgias, no joint swelling or stiffness, no limb pain or swelling  Integumentary: No complaints of skin rash or lesions, no itching, no skin wounds, no breast pain or lump  Neurological: No complaints of headache, no confusion, no convulsions, no numbness, no dizziness or fainting, no tingling, no limb weakness, no difficulty walking  Psychiatric: Not suicidal, no sleep disturbance, no anxiety or depression, no change in personality, no emotional problems  Endocrine: No complaints of proptosis, no hot flashes, no muscle weakness, no deepening of the voice, no feelings of weakness     Hematologic/Lymphatic: No complaints of swollen glands, no swollen glands in the neck, does not bleed easily, does not bruise easily  Active Problems    1  Back pain (724 5) (M54 9)   2  Chronic kidney disease, stage III (moderate) (585 3) (N18 3)   3  Cognitive impairment (294 9) (R41 89)   4  Constipation (564 00) (K59 00)   5  Depression (311) (F32 9)   6  GERD (gastroesophageal reflux disease) (530 81) (K21 9)   7  Hearing loss (389 9) (H91 90)   8  History of urinary frequency (V13 09) (Z87 898)   9  Hypokalemia (276 8) (E87 6)   10  Hypothyroidism (244 9) (E03 9)   11  Insomnia (780 52) (G47 00)   12  Iron deficiency anemia (280 9) (D50 9)   13  Laceration of chin without complication, initial encounter (873 44) (S01 81XA)   14  Loss of weight (783 21) (R63 4)   15  Mixed incontinence (788 33) (N39 46)   16  Muscle contusion (924 9) (T14 8)   17  Nonspecific abnormal results of function study of thyroid (794 5) (R94 6)   18  Osteoarthritis (715 90) (M19 90)   19  Osteoporosis (733 00) (M81 0)   20  Polydipsia (783 5) (R63 1)   21  Polyuria (788 42) (R35 8)   22  Protein-calorie malnutrition (263 9) (E46)   23  RLS (restless legs syndrome) (333 94) (G25 81)   24  Strain of knee, right, initial encounter (844 9) (H25 223O)    Past Medical History    1  History of Endometrial cancer (182 0) (C54 1)   2  History of urinary frequency (V13 09) (Z87 898)   3  History of Left hip pain (719 45) (M25 552)   4  History of MCI (mild cognitive impairment) (331 83) (G31 84)   5  History of Osteoarthritis of hand (715 94) (M19 049)   6  History of Subclinical hypothyroidism (244 8) (E03 9)   7  History of Urinary incontinence (788 30) (R32)    Surgical History    1  History of Hysterectomy   2  History of Reported Hx Of Hip Replacement - Right Side  Surgical History Reviewed: The surgical history was reviewed and updated today  Family History  Mother    1  Family history of Breast cancer   2  Family history of Diabetes  Father    3  Family history of Multiple sclerosis  Uncle    4   Family history of Diabetes  Family History Reviewed: The family history was reviewed and updated today  Social History    · Alcohol use   · Current every day smoker (305 1) (F17 200)   · Daily caffeine consumption   · [employer] / community college student   ·   Social History Reviewed: The social history was reviewed and updated today  The social history was reviewed and is unchanged  Current Meds   1  Calcium 600 MG Oral Tablet; TAKE 1 TABLET DAILY; Therapy: 88CPW5672 to (Evaluate:01Jan2017)  Requested for: 71KUQ6193; Last   FO:00UWJ6504 Ordered   2  Donepezil HCl - 10 MG Oral Tablet; take one tablet at bedtime; Therapy: 47AYB4568 to (Evaluate:03Sep2016)  Requested for: 19QZR9500; Last   KA:81FLB0485 Ordered   3  Ferrous Sulfate 325 (65 Fe) MG Oral Tablet Delayed Release; take one tablet at bedtime; Therapy: 68DNB6409 to (Gurdeep Mckeon)  Requested for: 41KTO7991; Last   Rx:29Jan2016 Ordered   4  Folic Acid 603 MCG Oral Tablet; TAKE 1 TABLET DAILY AS DIRECTED; Therapy: 28Rmw3286 to (Darnell Crain)  Requested for: 15YWA8400; Last   Rx:78Wwh6524 Ordered   5  Megestrol Acetate 625 MG/5ML Oral Suspension; TAKE 5 ML BY MOUTH ONCE DAILY; Therapy: 63XFI1992 to (Any Moise)  Requested for: 78CRS2652; Last   Rx:81Tye8796 Ordered   6  Mirtazapine 15 MG Oral Tablet; take one tablet at bedtime; Therapy: 51UZP4718 to (Gurdeep Mckeon)  Requested for: 99LSD1482; Last   Rx:29Jan2016 Ordered   7  Multi Vitamin Daily Oral Tablet; TAKE 1 TABLET DAILY; Therapy: 12Xgb5226 to (Evaluate:06Apr2017)  Requested for: 62Ibf0862; Last   Rx:16Kru3629 Ordered   8  Omeprazole 20 MG Oral Capsule Delayed Release; take 1 capsule daily; Therapy: 73PRX5419 to (Darnell Crain)  Requested for: 31EMO6553; Last   Rx:98Oxo4200 Ordered   9  Potassium Chloride ER 10 MEQ Oral Capsule Extended Release; take 2 capsules daily   as directed;    Therapy: 04AIJ4816 to (Evaluate:04Csn6003)  Requested for: 06YKV2997; Last Rx:34Yig2734 Ordered   10  TraZODone HCl - 50 MG Oral Tablet; Take 1/2 tablet daily at bedtime; Therapy: 90EEK2189 to (Evaluate:11Jun2016)  Requested for: 73SXT5056; Last    Rx:63Exn8168 Ordered   11  Tylenol 325 MG Oral Tablet Recorded   12  Vitamin D3 1000 UNIT Oral Tablet; Therapy: (Recorded:26Nov2014) to Recorded  Medication List Reviewed: The medication list was reviewed and updated today  Allergies    1  No Known Drug Allergies    Vitals  Signs [Data Includes: Current Encounter]   Recorded: 55Fej1314 02:17PM   Temperature: 98 9 F  Heart Rate: 68, L Radial  Respiration: 18  Systolic: 276, LUE, Sitting  Diastolic: 70, LUE, Sitting  Height: 5 ft 1 in  Weight: 93 lb 0 80 oz  BMI Calculated: 17 58  BSA Calculated: 1 36    Physical Exam    Constitutional   General appearance: No acute distress, well appearing and well nourished  Pulmonary   Respiratory effort: No increased work of breathing or signs of respiratory distress  Auscultation of lungs: Clear to auscultation  Cardiovascular   Auscultation of heart: Normal rate and rhythm, normal S1 and S2, no murmurs  Examination of extremities for edema and/or varicosities: Normal          Signatures   Electronically signed by :  CAROLYNN Harvey ; Jul 11 2016  2:42PM EST                       (Author)

## 2018-01-16 NOTE — PROGRESS NOTES
Assessment  Assessed   1  Ambulatory dysfunction (719 7) (R26 2)  2  Cognitive impairment (294 9) (R41 89)  3  Pain, acute due to trauma (338 11) (G89 11)  4  Physical deconditioning (799 3) (R53 81)  5  Fall, initial encounter (E888 9) (B19 [facility])    Plan  Back pain    · Stop: TraMADol HCl - 50 MG Oral Tablet  Fall, initial encounter    · Occupational Therapy Referral Other Co-Management  *  Status: Active  Requested for:  98IWR6995  YOU are Referring to a non-SL Preferred Provider : Established Patient  (MU) Care Summary provided  : Yes   · Physical Therapy Referral Other Co-Management  *  Status: Active  Requested for:  51WMH0136  YOU are Referring to a non-SL Preferred Provider : Established Patient  (MU) Care Summary provided  : Yes  PMH: Closed displaced fracture of acromial end of right clavicle with routine healing    · Stop: Acetaminophen 325 MG Oral Tablet    Discussion/Summary  Discussion Summary:   1 ) Gait dysfunction status post fall- Will consult PT/OT for evaluation  Will have patient stay on PC for closer observation  May need 24/7 care on PC continuous for long-term - will discuss with patient's daughter  OT - recommend check ACLS for ability to care for self at home  2 ) H/o fall - Ensure proper for her  Limit alcohol use  Continue with calcium  3 ) Pain 2/2 trauma - will order Morales-Sin patch to left ribs for pain  Will order scheduled Tylenol 975 mg p o  q 8 hours  Will DC tramadol patient states nonuse  4 ) deconditioning- patient at risk for pneumonia due to pain when coughing  Will order Incent  Spir  q 4 hours while awake to help prevent pneumonia  Patient mobile at this time  Add ensure daily in evening for protein supp to help with healing   5 ) Cognitive impairment- MMSE score within normal limits, however patient exhibits poor judgment in that she did not call for help after facial laceration and fall  Patient did have some trouble with clock making   May consider psychology consult for capacity if concern about patient returning to [place] on own   6 ) Forehead laceration - monitor site for s/s infection  NOtify provider if redness, swelling, drainage  DME form completed for PC    meds reviewed and signed  Counseling Documentation With Imm: The patient was counseled regarding instructions for management, importance of compliance with treatment  Patient's Capacity to Self-Care: Patient is unable to Self-Care: Not at this time r/t amb dys, pain, cog imp  Medication SE Review and Pt Understands Tx: Possible side effects of new medications were reviewed with the patient/guardian today  The treatment plan was reviewed with the patient/guardian  The patient/guardian understands and agrees with the treatment plan      Chief Complaint  Chief Complaint Free Text Note Form: Pt here for f/u- S/P FALL 11/25/17   Pt evaluated [facility] ER 11/25/17  Discharge to [facility]  Complete DME while OV  Initially schedule for routine visit       History of Present Illness  HPI: Mrs Author Meade presents s/P fall  She was seen in the ER please see report for details  She states that she remembers making her evening cocktail, then the next thing she knew was waking up in the morning  She does not recall wether she took medication  She does note that she was in living room when she woke but daughter states there was blood in the bathroom and living room  She did not call for help- instead it was her friend who called the [address] [employer] and had help sent to her  Patient states this 1st fall she has had several months  She states she is very sore right now, especially left side ribs  She has laceration and bruising on the left side of forehead  patient is able to dress self but having trouble because of pain      Review of Systems  Complete-Female:   Constitutional: No fever, no chills, feels well, no tiredness, no recent weight gain or weight loss     Eyes: No complaints of eye pain, no red eyes, no eyesight problems, no discharge, no dry eyes, no itching of eyes  Cardiovascular: No complaints of slow heart rate, no fast heart rate, no chest pain, no palpitations, no leg claudication, no lower extremity edema  Respiratory: No complaints of shortness of breath, no wheezing, no cough, no SOB on exertion, no orthopnea, no PND  Gastrointestinal: No complaints of abdominal pain, no constipation, no nausea or vomiting, no diarrhea, no bloody stools  Genitourinary: No complaints of dysuria, no incontinence, no pelvic pain, no dysmenorrhea, no vaginal discharge or bleeding  Musculoskeletal: arthralgias and myalgias  Integumentary: No complaints of skin rash or lesions, no itching, no skin wounds, no breast pain or lump  Neurological: No complaints of headache, no confusion, no convulsions, no numbness, no dizziness or fainting, no tingling, no limb weakness, no difficulty walking  Psychiatric: Not suicidal, no sleep disturbance, no anxiety or depression, no change in personality, no emotional problems  Active Problems  Problems   1  Acute pain of right shoulder (719 41) (M25 511)  2  Ambulatory dysfunction (719 7) (R26 2)  3  Anemia (285 9) (D64 9)  4  Back pain (724 5) (M54 9)  5  Back pain (724 5) (M54 9)  6  Cerumen impaction (380 4) (H61 20)  7  Chronic kidney disease, stage III (moderate) (585 3) (N18 3)  8  Cognitive impairment (294 9) (R41 89)  9  Constipation (564 00) (K59 00)  10  Cough (786 2) (R05)  11  Depression (311) (F32 9)  12  Diarrhea (787 91) (R19 7)  13  DNR (do not resuscitate) discussion (V65 49) (Z71 89)  14  Encounter for home safety review for injury prevention (V65 43) (Z71 89)  15  Fall, initial encounter (E888 9) (W19 XXXA)  16  GERD (gastroesophageal reflux disease) (530 81) (K21 9)  17  Hearing loss (389 9) (H91 90)  18  History of urinary frequency (V13 09) (Z87 898)  19  Hypokalemia (276 8) (E87 6)  20  Hypothyroidism (244 9) (E03 9)  21  Insomnia (780 52) (G47 00)  22   Iron deficiency anemia (280 9) (D50 9)  23  Loss of weight (783 21) (R63 4)  24  Mixed incontinence (788 33) (N39 46)  25  Multiple falls (V15 88) (R29 6)  26  Muscle contusion (924 9) (T14 8XXA)  27  Nonspecific abnormal results of function study of thyroid (794 5) (R94 6)  28  Open wound of left upper arm (880 03) (S41 102A)  29  Osteoarthritis (715 90) (M19 90)  30  Osteoporosis (733 00) (M81 0)  31  Physician orders for life-sustaining treatment (POLST) form indicates patient wish for    do-not-resuscitate status (V49 86) (Z66)  32  Polydipsia (783 5) (R63 1)  33  Polyuria (788 42) (R35 8)  34  Protein-calorie malnutrition (263 9) (E46)  35  Right shoulder pain (719 41) (M25 511)  36  RLS (restless legs syndrome) (333 94) (G25 81)  37  Short-term memory loss (780 93) (R41 3)  38  Skin irritation (709 9) (R23 8)  39  Strain of knee, right, initial encounter (844 9) (S86 911A)  40  Wrist pain, left (719 43) (M25 532)    Past Medical History  Problems   1  History of Abrasion of right upper extremity, initial encounter (912 0) (S40 811A)  2  History of Closed displaced fracture of acromial end of right clavicle with routine healing   (V54 19) (S42 031D)  3  History of Closed fracture of one rib of left side, initial encounter (807 01) (S22 32XA)  4  History of Endometrial cancer (182 0) (C54 1)  5  History of urinary frequency (V13 09) (Z87 898)  6  History of Laceration of chin without complication, initial encounter (873 44) (S01 81XA)  7  History of Left hip pain (719 45) (M25 552)  8  History of MCI (mild cognitive impairment) (331 83) (G31 84)  9  History of Osteoarthritis of hand (715 94) (M19 049)  10  History of Subclinical hypothyroidism (244 8) (E03 9)  11  History of Urinary incontinence (788 30) (R32)    Surgical History  Problems   1  History of Hysterectomy  2  History of Reported Hx Of Hip Replacement - Right Side    Family History  Mother   1  Family history of Breast cancer  2   Family history of Diabetes  Father 3  Family history of Multiple sclerosis  Uncle   4  Family history of Diabetes    Social History  Problems    · Alcohol use   · Current every day smoker (305 1) (F17 200)   · Daily caffeine consumption   · [employer] / community college student   ·     Current Meds  1  Acetaminophen 325 MG Oral Tablet; take 2 tablet by mouth twice a day PRN; Therapy: (Recorded:03Oct2016) to Recorded  2  Acetaminophen 500 MG Oral Tablet; TAKE 2 TABLET Every 8 hours x 10 days; Therapy: 16CTO6941 to (Evaluate:08Dec2017) Recorded  3  Bengay Ultra Strength 5 % External Patch; APPLY 1 PATCH Every morning to left rib area   in am, off 12hrs later; Therapy: 20HUD6274 to Recorded  4  Calcium 600 MG Oral Tablet; TAKE 1 TABLET DAILY; Therapy: 95FXV0542 to (Evaluate:01Jan2017)  Requested for: 61RAI9829; Last   GV:87NWM3668 Ordered  5  Debrox 6 5 % Otic Solution; apply 5 drops twice daily in both ears x 4 days; Therapy: 34OUS1683 to (Last Mery Pacheco)  Requested for: 20Oct2017; Status: ACTIVE -   Retrospective By Protocol Authorization Ordered  6   MG Oral Capsule; take 1 tablet by mouth daily; Therapy: (Tamera Navarro) to Recorded  7  Ferrous Sulfate 325 (65 Fe) MG Oral Tablet Delayed Release; take one tablet at bedtime; Therapy: 43TRZ0988 to (Evaluate:16Svq5908)  Requested for: 30Jun2017; Last   Rx:51Drh3101 Ordered  8  Folic Acid 568 MCG Oral Tablet; TAKE 1 TABLET DAILY AS DIRECTED; Therapy: 89Epo5789 to [phone]  Requested for: 30Jun2017; Last   Rx:30Jun2017 Ordered  9  Levothyroxine Sodium 25 MCG Oral Tablet; Take 1 tablet daily in the morning 1 hour   before breakfast;   Therapy: 35VPA9326 to (Evaluate:25Jan2018)  Requested for: 06NTB5225; Last   Rx:23Qgb8244; Status: ACTIVE - Retrospective By Protocol Authorization Ordered  10  Milk of Magnesia SUSP; ADMINISTER 30ML PO PRN ONE TIME DAILY; Therapy: (Tamera Navarro) to Recorded  11   Mirtazapine 15 MG Oral Tablet; take one tablet at bedtime; Therapy: 18JNB5956 to [phone])  Requested for: 85HEK3609; Last    Rx:87Chp4573; Status: ACTIVE - Retrospective By Protocol Authorization Ordered  12  Multi Vitamin Daily Oral Tablet; TAKE 1 TABLET DAILY; Therapy: 41Aqy2191 to (Evaluate:06Apr2017)  Requested for: 28Mhl1262; Last    Rx:43Paw3607 Ordered  13  Potassium Chloride ER 10 MEQ Oral Capsule Extended Release; TAKE 2 CAPSULES    AS DIRECTED; Therapy: 97CSJ6357 to (Evaluate:84Dah8727)  Requested for: 19Gsd1123; Last    Rx:10Aug2017 Ordered  14  ROPINIRole HCl - 2 MG Oral Tablet; TAKE ONE TABLET AT VMGJZQX64;    Therapy: 87IIR9641 to (Evaluate:25Jan2018)  Requested for: 96KYR1376; Last    Rx:09Pyx7939; Status: ACTIVE - Retrospective By Protocol Authorization Ordered  15  Silver Sulfadiazine 1 % External Cream; APPLY  AND RUB  IN A THIN FILM TO AFFECTED    AREAS TWICE DAILY  (AM AND PM) x 10 days; Therapy: 21Apr2017 to (Last Rx:21Apr2017)  Requested for: 14DWG8497 Ordered  16  TraMADol HCl - 50 MG Oral Tablet; TAKE 0 5 TABLET EVERY 6HRS PRN; Therapy: 16Onc7890 to (Last Rx:47Oaf0484) Ordered  17  TraZODone HCl - 50 MG Oral Tablet; Take 1/2 tablet daily at bedtime; Therapy: 82GJP9418 to (Evaluate:25Jan2018)  Requested for: 01KOK8725; Last    Rx:24Ike2569; Status: ACTIVE - Retrospective By Protocol Authorization Ordered  18  Vitamin C TABS; TAKE  2 GUMMIES(125MG) DAILY; Therapy: (Recorded:52Tag0312) to Recorded  Medication List Reviewed: The medication list was reviewed and updated today  Allergies  Medication   1  Omeprazole CPDR  2   Megace ES SUSP    Vitals  Signs   Recorded: 78ROH9270 12:12PM   Temperature: 98 5 F  Heart Rate: 82, R Radial  Respiration: 16  Systolic: 943, RUE, Sitting  Diastolic: 70, RUE, Sitting  Weight: 92 lb 0 5 oz  BMI Calculated: 17 39  BSA Calculated: 1 36  O2 Saturation: 96    Physical Exam  General Complete Exam (Brief):   Constitutional   General appearance: No acute distress, well appearing and well nourished  Pulmonary   Respiratory effort: No increased work of breathing or signs of respiratory distress  Auscultation of lungs: Clear to auscultation  Cardiovascular   Auscultation of heart: Normal rate and rhythm, normal S1 and S2, without murmurs  Abdomen   Abdomen: Non-tender, no masses  Musculoskeletal   Gait and station: Abnormal   ok with walker  Skin   Skin and subcutaneous tissue: Abnormal   (left side of forehead with bruising  Scabbed laceration)   Psychiatric   Orientation to person, place and time: Normal   (forgetful, tangential  MMSE: Orientation - 5/5 (T) 5/5(P); Registration 3/3; Attention: 5/5; Recall 2/3; Language 6/6; Clock - see drawing) Mental Status: decreased attention span and decreased concentrating ability, but oriented to person, place, and time     Mood and affect: Normal        Signatures   Electronically signed by : Catalina Macias; Nov 28 2017  1:01PM EST                       (Author)    Electronically signed by : CAROLYNN Alberts ; Nov 29 2017  8:53AM EST                       (Co-author)    Electronically signed by : CAROLYNN Alberts ; Nov 29 2017  8:53AM EST                       (Co-author)

## 2018-01-16 NOTE — PROGRESS NOTES
Assessment    1  Closed fracture of one rib of left side, initial encounter (807 01) (S22 32XA)   2  Multiple falls (V15 88) (R29 6)   3  Protein-calorie malnutrition (263 9) (E46)    Plan    1  Occupational Therapy Referral Other Physician Referral  Consult  Status: Hold For -   Scheduling  Requested for: 20Nel1608  are Referring to a non-SL Preferred Provider : Scheduling access issues  Care Summary provided  : Yes   2  Physical Therapy Referral Other Physician Referral  Consult  Status: Hold For -   Scheduling  Requested for: 95Mwh4266  are Referring to a non-SL Preferred Provider : Scheduling access issues  Care Summary provided  : Yes   3  * XR HIP/PELV 2-3 VWS LEFT W PELVIS IF PERFORMED; Status:Active; Requested   for:61Zvi0817;    4  * XR RIBS LEFT W PA CHEST MIN 3 VIEWS; Status:Active; Requested for:63Fem5367;     Discussion/Summary  Discussion Summary:   #1: Closed fracture of left 12th rib: Patient does have what appears to be a fracture of the left 12th rib  She is not taking the Tylenol at home secondary to not being able to get up to get it  This is another injury after fall  Based on all this, patient does not appear to be safe to go home to her apartment at this point  I would strongly recommend that she go to [facility]  She will be going to [facility] at this point  No other changes yet  She is speaking with Toño Leo the manager PC, as well as the patient's daughter  #2: Multiple falls: Again, the patient has a significant amount of weakness and pain  She is not able to protect herself at this point  She will need some physical therapy for gait and balance  Hopefully with being on personal care she'll be able to make that happen sooner rather than later  Strongly recommend that she limit alcohol use secondary to the increased problems that it presents with regard to increased risk of falls  #3: Protein calorie malnutrition: Patient does have a significant amount of protein calorie malnutrition   Would strongly recommend that she follow-up with Ethan, take meals in the dining room, and continue with nutritional supplementation  Up to this point, she has not wished to have much intake of food  Medication SE Review and Pt Understands Tx: Possible side effects of new medications were reviewed with the patient/guardian today  The treatment plan was reviewed with the patient/guardian  The patient/guardian understands and agrees with the treatment plan      Chief Complaint  Chief Complaint Free Text Note Form: S/P FALL 12/20/16  Pt c/o left sided rib pain, hurts with deep breath  Denies SOB or hitting head  History of Present Illness  HPI: Patient felt, again, yesterday  Has had multiple falls recently  The staff from the wellness center went down and evaluated her yesterday  At that point, she was not having significant amount of problems  Today she is having significant pain on the left side, lower into the back towards the pelvis  It is also in the area of the ribs and spine  She does not recall any specific mechanism of action with this  She recalls walking from the bedroom into her couch and placing a book down, and then wound up on the ground  She has been progressively weak and declining  Reviewed with her about moving to [facility]  She absolutely refuses  Review of Systems  Complete-Female:   Constitutional: No fever, no chills, feels well, no tiredness, no recent weight gain or weight loss  Eyes: No complaints of eye pain, no red eyes, no eyesight problems, no discharge, no dry eyes, no itching of eyes  ENT: no complaints of earache, no loss of hearing, no nose bleeds, no nasal discharge, no sore throat, no hoarseness  Cardiovascular: No complaints of slow heart rate, no fast heart rate, no chest pain, no palpitations, no leg claudication, no lower extremity edema  Respiratory: No complaints of shortness of breath, no wheezing, no cough, no SOB on exertion, no orthopnea, no PND  Gastrointestinal: No complaints of abdominal pain, no constipation, no nausea or vomiting, no diarrhea, no bloody stools  Genitourinary: No complaints of dysuria, no incontinence, no pelvic pain, no dysmenorrhea, no vaginal discharge or bleeding  Musculoskeletal: No complaints of arthralgias, no myalgias, no joint swelling or stiffness, no limb pain or swelling  Integumentary: No complaints of skin rash or lesions, no itching, no skin wounds, no breast pain or lump  Neurological: No complaints of headache, no confusion, no convulsions, no numbness, no dizziness or fainting, no tingling, no limb weakness, no difficulty walking  Psychiatric: Not suicidal, no sleep disturbance, no anxiety or depression, no change in personality, no emotional problems  Endocrine: No complaints of proptosis, no hot flashes, no muscle weakness, no deepening of the voice, no feelings of weakness  Hematologic/Lymphatic: No complaints of swollen glands, no swollen glands in the neck, does not bleed easily, does not bruise easily  Active Problems    1  Abrasion of right upper extremity, initial encounter (912 0) (S40 811A)   2  Acute pain of right shoulder (719 41) (M25 511)   3  Back pain (724 5) (M54 9)   4  Back pain (724 5) (M54 9)   5  Chronic kidney disease, stage III (moderate) (585 3) (N18 3)   6  Cognitive impairment (294 9) (R41 89)   7  Constipation (564 00) (K59 00)   8  Cough (786 2) (R05)   9  Depression (311) (F32 9)   10  Diarrhea (787 91) (R19 7)   11  DNR (do not resuscitate) discussion (V65 49) (Z71 89)   12  Fall, initial encounter (O777 9) (W19 XXXA)   13  GERD (gastroesophageal reflux disease) (530 81) (K21 9)   14  Hearing loss (389 9) (H91 90)   15  History of urinary frequency (V13 09) (Z87 898)   16  Hypokalemia (276 8) (E87 6)   17  Hypothyroidism (244 9) (E03 9)   18  Insomnia (780 52) (G47 00)   19  Iron deficiency anemia (280 9) (D50 9)   20   Laceration of chin without complication, initial encounter (873 44) (S01 81XA)   21  Loss of weight (783 21) (R63 4)   22  Mixed incontinence (788 33) (N39 46)   23  Muscle contusion (924 9) (T14 8)   24  Nonspecific abnormal results of function study of thyroid (794 5) (R94 6)   25  Osteoarthritis (715 90) (M19 90)   26  Osteoporosis (733 00) (M81 0)   27  Physician orders for life-sustaining treatment (POLST) form indicates patient wish for    do-not-resuscitate status (V49 86) (Z66)   28  Polydipsia (783 5) (R63 1)   29  Polyuria (788 42) (R35 8)   30  Protein-calorie malnutrition (263 9) (E46)   31  Right shoulder pain (719 41) (M25 511)   32  RLS (restless legs syndrome) (333 94) (G25 81)   33  Skin irritation (709 9) (R23 8)   34  Strain of knee, right, initial encounter (844 9) (S86 911A)   35  Wrist pain, left (719 43) (M25 532)    Past Medical History    1  History of Closed displaced fracture of acromial end of right clavicle with routine healing   (V54 19) (S42 031D)   2  History of Endometrial cancer (182 0) (C54 1)   3  History of urinary frequency (V13 09) (Z87 898)   4  History of Left hip pain (719 45) (M25 552)   5  History of MCI (mild cognitive impairment) (331 83) (G31 84)   6  History of Osteoarthritis of hand (715 94) (M19 049)   7  History of Subclinical hypothyroidism (244 8) (E03 9)   8  History of Urinary incontinence (788 30) (R32)    Surgical History    1  History of Hysterectomy   2  History of Reported Hx Of Hip Replacement - Right Side  Surgical History Reviewed: The surgical history was reviewed and updated today  Family History  Mother    1  Family history of Breast cancer   2  Family history of Diabetes  Father    3  Family history of Multiple sclerosis  Uncle    4  Family history of Diabetes  Family History Reviewed: The family history was reviewed and updated today         Social History    · Alcohol use   · Current every day smoker (305 1) (F17 200)   · Daily caffeine consumption   · [employer] / community college student   ·   Social History Reviewed: The social history was reviewed and updated today  The social history was reviewed and is unchanged  Current Meds   1  Acetaminophen 325 MG Oral Tablet; take 2 tablet by mouth twice a day PRN; Therapy: (Recorded:03Oct2016) to Recorded   2  Calcium 600 MG Oral Tablet; TAKE 1 TABLET DAILY; Therapy: 52GFJ8499 to (Evaluate:01Jan2017)  Requested for: 46HLE8855; Last   YV:20KEO2726 Ordered   3  Desitin 13 % External Cream; APPLY AS NEEDED FOR PROTECTION PRN; Therapy: (Recorded:03Oct2016) to Recorded   4   MG Oral Capsule; take 1 tablet by mouth daily; Therapy: (Dorrine Gill) to Recorded   5  Donepezil HCl - 10 MG Oral Tablet; take one tablet at bedtime; Therapy: 74CHK9741 to (Evaluate:13Jan2017)  Requested for: 01WZH3784; Last   Rx:47Vmb5202; Status: ACTIVE - Renewal Denied Ordered   6  Ferrous Sulfate 325 (65 Fe) MG Oral Tablet Delayed Release; take one tablet at bedtime; Therapy: 41FBW6252 to (Carvin Sos)  Requested for: 16NTT5454; Last   Rx:29Jan2016 Ordered   7  Folic Acid 361 MCG Oral Tablet; TAKE 1 TABLET DAILY AS DIRECTED; Therapy: 13Apr2015 to (Evaluate:07Jan2017)  Requested for: 05LAB7054; Last   Rx:72Vbt2142 Ordered   8  Levothyroxine Sodium 25 MCG Oral Tablet; Take one daily in the morning one hour   before breakfast;   Therapy: 27GOU7927 to (Last Rx:07Nov2016)  Requested for: 99JSY2612 Ordered   9  Memantine HCl - 10 MG Oral Tablet; TAKE ONE TABLET BY MOUTH ONCE DAILY; Therapy: 92QIG8367 to (Evaluate:07Jan2017)  Requested for: 10LVU6850; Last   Rx:34Iol0630 Ordered   10  Milk of Magnesia SUSP; ADMINISTER 30ML PO PRN ONE TIME DAILY; Therapy: (Dorrine Gill) to Recorded   11  Mirtazapine 15 MG Oral Tablet; take one tablet at bedtime; Therapy: 20PHF7842 to (Carvin Sos)  Requested for: 21NBP3059; Last    Rx:29Jan2016 Ordered   12  Multi Vitamin Daily Oral Tablet; TAKE 1 TABLET DAILY;     Therapy: 55Dze4311 to (([phone])  Requested for: 77Sta8020; Last    Rx:85Zid1497 Ordered   13  Potassium Chloride ER 10 MEQ Oral Capsule Extended Release; take 2 capsules daily    as directed; Therapy: 37VIO6379 to (Davis Lopez)  Requested for: 01WTI3728; Last    Rx:12Pxb7065 Ordered   14  ROPINIRole HCl - 2 MG Oral Tablet; TAKE ONE TABLET AT FFXCWRH32;    Therapy: 06BRM5137 to (Lele Morin)  Requested for: 97BNR9720; Last    Rx:82Pkc3049 Ordered   15  TraZODone HCl - 50 MG Oral Tablet; Take 1/2 tablet daily at bedtime; Therapy: 83QKP8830 to (Evaluate:22Egp6904)  Requested for: 88DMN9852; Last    Rx:60Hzx4778; Status: ACTIVE - Renewal Denied Ordered   16  Vitamin C TABS; TAKE  2 GUMMIES(125MG) DAILY; Therapy: (Recorded:75Dxd7704) to Recorded  Medication List Reviewed: The medication list was reviewed and updated today  Allergies    1  Omeprazole CPDR   2  Megace ES SUSP    Vitals  Signs    Temperature: 98 5 F  Heart Rate: 70, L Radial  Respiration: 16  Systolic: 98, LUE, Sitting  Diastolic: 66, LUE, Sitting  Patient Refused Height: Yes  Patient Refused Weight: Yes  Patient Refused Height: Yes  Patient Refused Weight: Yes    Physical Exam  General Multi-System Exam [facility]:   Constitutional   General appearance: Abnormal   chronically ill, uncomfortable, normal body odor, does not smell of feces, does not smell of urine, malnourished, underweight, disheveled clothing, well groomed, not hirsute, appears tired, acutely exhausted and poorly hydrated  Pulmonary   Respiratory effort: Abnormal   Respiratory rate: normal  Assessment of respiratory effort revealed shallow respirations  Auscultation of lungs: Abnormal   Auscultation of the lungs revealed decreased breath sounds diffusely  Cardiovascular   Auscultation of heart: Normal rate and rhythm, normal S1 and S2, no murmurs      Musculoskeletal   Gait and station: Abnormal   (Unable to walk, wheelchair-bound currently secondary to pain) Inspection/palpation of joints, bones, and muscles: Abnormal   (Significant tenderness, point tenderness, over the left 12th rib  Spine appears to be reasonable, pelvis without tenderness on palpation  Left hip without tenderness, including the greater trochanteric bursa)        Signatures   Electronically signed by :  CAROLYNN Ramirez ; Dec 21 2016 12:23PM EST                       (Author)

## 2018-01-16 NOTE — PROGRESS NOTES
Assessment    1  Cerumen impaction (380 4) (H61 20)    Plan    1  Follow Up if Not Better Evaluation and Treatment  Follow-up  Status: Complete  Done:   94CIU4158   2  Clean the outside of your ears with a washcloth ; Status:Complete;   Done: 21UFP2473   3  How to use ear drops ; Status:Complete;   Done: 90LZT5568   4  Never put cotton swabs inside your ears ; Status:Complete;   Done: 26ZZC1261   5  To remove earwax at home, use a soft-tipped rubber ear syringe ; Status:Complete;     Done: 78TGS2739    Discussion/Summary  Discussion Summary:   1 ) Cerumen impaction - recommended on Friday that pt use debrox prior to visit for cerumen impaction  Medication effective, patient's ear canals clear  No procedure required today  Advised to stop using debrox at this time  Can resume 5-10 drops at hs x4 days prior to next visit for cerumen impaction  REturn as needed  Ear precautions reviewed  Return to office if increased hearing loss or pain  Counseling Documentation With Imm: The patient was counseled regarding instructions for management, importance of compliance with treatment  Medication SE Review and Pt Understands Tx: Possible side effects of new medications were reviewed with the patient/guardian today  The treatment plan was reviewed with the patient/guardian  The patient/guardian understands and agrees with the treatment plan      Chief Complaint  Chief Complaint Free Text Note Form: patient here for wax removal       History of Present Illness  HPI: Mrs Philip Nicole is here for complaints of ear fullness left ear  She would like evaluated and cleaned as needed  SHe denies previous tm concerns  Patient has used debrox x2 nights and feels hearing is much improved this am       Review of Systems  Complete-Female:   Constitutional: No fever, no chills, feels well, no tiredness, no recent weight gain or weight loss     ENT: no complaints of earache, no loss of hearing, no nose bleeds, no nasal discharge, no sore throat, no hoarseness  Neurological: No complaints of headache, no confusion, no convulsions, no numbness, no dizziness or fainting, no tingling, no limb weakness, no difficulty walking  Psychiatric: Not suicidal, no sleep disturbance, no anxiety or depression, no change in personality, no emotional problems  Active Problems    1  Acute pain of right shoulder (719 41) (M25 511)   2  Ambulatory dysfunction (719 7) (R26 2)   3  Anemia (285 9) (D64 9)   4  Back pain (724 5) (M54 9)   5  Back pain (724 5) (M54 9)   6  Cerumen impaction (380 4) (H61 20)   7  Chronic kidney disease, stage III (moderate) (585 3) (N18 3)   8  Cognitive impairment (294 9) (R41 89)   9  Constipation (564 00) (K59 00)   10  Cough (786 2) (R05)   11  Depression (311) (F32 9)   12  Diarrhea (787 91) (R19 7)   13  DNR (do not resuscitate) discussion (V65 49) (Z71 89)   14  Encounter for home safety review for injury prevention (V65 43) (Z71 89)   15  Fall, initial encounter (E888 9) (W19 XXXA)   16  GERD (gastroesophageal reflux disease) (530 81) (K21 9)   17  Hearing loss (389 9) (H91 90)   18  History of urinary frequency (V13 09) (Z87 898)   19  Hypokalemia (276 8) (E87 6)   20  Hypothyroidism (244 9) (E03 9)   21  Insomnia (780 52) (G47 00)   22  Iron deficiency anemia (280 9) (D50 9)   23  Loss of weight (783 21) (R63 4)   24  Mixed incontinence (788 33) (N39 46)   25  Multiple falls (V15 88) (R29 6)   26  Muscle contusion (924 9) (T14 8XXA)   27  Nonspecific abnormal results of function study of thyroid (794 5) (R94 6)   28  Open wound of left upper arm (880 03) (S41 102A)   29  Osteoarthritis (715 90) (M19 90)   30  Osteoporosis (733 00) (M81 0)   31  Physician orders for life-sustaining treatment (POLST) form indicates patient wish for    do-not-resuscitate status (V49 86) (Z66)   32  Polydipsia (783 5) (R63 1)   33  Polyuria (788 42) (R35 8)   34  Protein-calorie malnutrition (263 9) (E46)   35   Right shoulder pain (649 41) (M25 511)   36  RLS (restless legs syndrome) (333 94) (G25 81)   37  Short-term memory loss (780 93) (R41 3)   38  Skin irritation (709 9) (R23 8)   39  Strain of knee, right, initial encounter (844 9) (S86 911A)   40  Wrist pain, left (719 43) (M25 532)    Past Medical History    1  History of Abrasion of right upper extremity, initial encounter (912 0) (S40 811A)   2  History of Closed displaced fracture of acromial end of right clavicle with routine healing   (V54 19) (S42 031D)   3  History of Closed fracture of one rib of left side, initial encounter (807 01) (S22 32XA)   4  History of Endometrial cancer (182 0) (C54 1)   5  History of urinary frequency (V13 09) (Z87 898)   6  History of Laceration of chin without complication, initial encounter (873 44) (S01 81XA)   7  History of Left hip pain (719 45) (M25 552)   8  History of MCI (mild cognitive impairment) (331 83) (G31 84)   9  History of Osteoarthritis of hand (715 94) (M19 049)   10  History of Subclinical hypothyroidism (244 8) (E03 9)   11  History of Urinary incontinence (788 30) (R32)    Surgical History    1  History of Hysterectomy   2  History of Reported Hx Of Hip Replacement - Right Side    Family History  Mother    1  Family history of Breast cancer   2  Family history of Diabetes  Father    3  Family history of Multiple sclerosis  Uncle    4  Family history of Diabetes    Social History    · Alcohol use   · Current every day smoker (305 1) (F17 200)   · Daily caffeine consumption   · [employer] / community college student   ·     Current Meds   1  Acetaminophen 325 MG Oral Tablet; take 2 tablet by mouth twice a day PRN; Therapy: (Recorded:03Oct2016) to Recorded   2  Calcium 600 MG Oral Tablet; TAKE 1 TABLET DAILY; Therapy: 58NMM3860 to (Evaluate:01Jan2017)  Requested for: 81LME0026; Last   SI:40KLF4176 Ordered   3  Debrox 6 5 % Otic Solution; apply 5 drops twice daily in both ears x 4 days;    Therapy: 00CYC9063 to (Last Rx:20Oct2017) Requested for: 75KVA5517; Status: ACTIVE -   Retrospective By Protocol Authorization Ordered   4   MG Oral Capsule; take 1 tablet by mouth daily; Therapy: (Tamera Navarro) to Recorded   5  Ferrous Sulfate 325 (65 Fe) MG Oral Tablet Delayed Release; take one tablet at bedtime; Therapy: 92IRU7178 to (Evaluate:19Ody5245)  Requested for: 30Jun2017; Last   Rx:37Lnu9457 Ordered   6  Folic Acid 898 MCG Oral Tablet; TAKE 1 TABLET DAILY AS DIRECTED; Therapy: 33Hjg7136 to ()  Requested for: 30Jun2017; Last   Rx:30Jun2017 Ordered   7  Levothyroxine Sodium 25 MCG Oral Tablet; Take 1 tablet daily in the morning 1 hour   before breakfast;   Therapy: 74TRU8792 to (Evaluate:25Jan2018)  Requested for: 87JBT4040; Last   Rx:13Zls2965; Status: ACTIVE - Retrospective By Protocol Authorization Ordered   8  Milk of Magnesia SUSP; ADMINISTER 30ML PO PRN ONE TIME DAILY; Therapy: (Tamera Navarro) to Recorded   9  Mirtazapine 15 MG Oral Tablet; take one tablet at bedtime; Therapy: 23FOH9480 to ([phone])  Requested for: 59PRD4579; Last   Rx:43Gvk2273; Status: ACTIVE - Retrospective By Protocol Authorization Ordered   10  Multi Vitamin Daily Oral Tablet; TAKE 1 TABLET DAILY; Therapy: 60Pnu9300 to (Evaluate:06Apr2017)  Requested for: 30Mip2363; Last    Rx:46Byn0312 Ordered   11  Potassium Chloride ER 10 MEQ Oral Capsule Extended Release; TAKE 2 CAPSULES    AS DIRECTED; Therapy: 26JJI9033 to (Evaluate:08Vzq3115)  Requested for: 93HFV5577; Last    Rx:72Ewr7620 Ordered   12  ROPINIRole HCl - 2 MG Oral Tablet; TAKE ONE TABLET AT MWMZPGA72;    Therapy: 30CKS7706 to (RDELICPW:01UIZ8860)  Requested for: 91SXQ0122; Last    Rx:21Kqi7490; Status: ACTIVE - Retrospective By Protocol Authorization Ordered   13  Silver Sulfadiazine 1 % External Cream; APPLY  AND RUB  IN A THIN FILM TO AFFECTED    AREAS TWICE DAILY  (AM AND PM) x 10 days;     Therapy: 59Xin1806 to (Last Rx:21Apr2017)  Requested for: 56UVI6132 Ordered   14  TraMADol HCl - 50 MG Oral Tablet; TAKE 0 5 TABLET EVERY 6HRS PRN; Therapy: 69Ode9187 to (Last Rx:01Jrf2112) Ordered   15  TraZODone HCl - 50 MG Oral Tablet; Take 1/2 tablet daily at bedtime; Therapy: 69HKX5006 to (Evaluate:25Jan2018)  Requested for: 19WDY9121; Last    Rx:19Cbg0480; Status: ACTIVE - Retrospective By Protocol Authorization Ordered   16  Vitamin C TABS; TAKE  2 GUMMIES(125MG) DAILY; Therapy: (Recorded:58Ujn2550) to Recorded  Medication List Reviewed: The medication list was reviewed and updated today  Allergies    1  Omeprazole CPDR   2  Megace ES SUSP    Vitals  Signs    Heart Rate: 69, L Radial  Respiration: 16  Systolic: 737, LUE, Sitting  Diastolic: 60, LUE, Sitting  Weight: 92 lb   BMI Calculated: 17 38  BSA Calculated: 1 36  O2 Saturation: 97    Physical Exam  General Complete Exam (Brief):   Constitutional   General appearance: No acute distress, well appearing and well nourished  Ears, Nose, Mouth, and Throat   Otoscopic examination: Tympanic membrance translucent with normal light reflex  Canals patent without erythema   (completely clear ear canals, able to visualize intact tm;s, pearly grey) The right tympanic membrane was normal  The left tympanic membrane was normal  The right external canal was normal  The left external canal was normal    Pulmonary   Respiratory effort: No increased work of breathing or signs of respiratory distress      Psychiatric   Orientation to person, place and time: Normal   (forgetful)      Future Appointments    Date/Time Provider Specialty Site   11/28/2017 11:00 AM Greg De Pazman, [address] OFFICE     Signatures   Electronically signed by : KRISTI Regalado; Oct 24 2017 11:49AM EST                       (Author)    Electronically signed by : CAROLYNN Hernandez ; Nov 27 2017  1:30PM EST                       (Co-author)    Electronically signed by : CAROLYNN Hernandez ; Nov 27 2017  1:30PM EST                       (Co-author)

## 2018-01-16 NOTE — PROGRESS NOTES
Assessment    1  Protein-calorie malnutrition (263 9) (E46)   2  Chronic kidney disease, stage III (moderate) (585 3) (N18 3)   3  Hypothyroidism (244 9) (E03 9)   4  Laceration of chin without complication, initial encounter (873 44) (S01 81XA)   5  Depression (311) (F32 9)   6  Constipation (564 00) (K59 00)   7  Cognitive impairment (294 9) (R41 89)   8  Current every day smoker (305 1) (F17 200)    Plan    · (1) COMPREHENSIVE METABOLIC PANEL; Status:Canceled;    · (1) FERRITIN; Status:Canceled;    · (1) FOLATE; Status:Canceled;    · (1) IRON; Status:Canceled;    · (1) MAGNESIUM; Status:Canceled;    · (1) TIBC; Status:Canceled;    · (1) TSH WITH FT4 REFLEX; Status:Canceled;    · (1) VITAMIN B12; Status:Canceled;     · (1) COMPREHENSIVE METABOLIC PANEL; Status:Canceled;     · From  Stool Softener 100 MG Oral Capsule take 1 capsule two times a day To  Stool Softener 100 MG Oral Capsule TAKE 1 CAPSULE Daily PRN constipation    · (1) TSH; Status:Canceled;     · We recommend you quit smoking  Time spent counseling today was greater than 3  minutes ; Status:Complete;   Done: 65YUH0526 11:32AM   · You need to quit smoking ; Status:Complete;   Done: 60UBZ8372 11:32AM   · Call 911 if: You have any symptoms of a stroke ; Status:Complete;   Done: 06PFU5836  11:32AM    Discussion/Summary  Discussion Summary:   #1: Protein calorie malnutrition: Patient still is down on weight versus her ideal, however it is slightly improved versus her last visit here  At the moment, continue with current treatments, including supplement shakes  #2: CKD 3: Check labs in the future  She does have an order for near future  #3: Hypothyroidism: As per #2, check labs, follow-up after that  #4: Laceration of the chin without complications, initial encounter: Patient is seen here for the first time, though she was seen at the urgent care Center  4 sutures are removed as described in the note   No complications, with the exception of the fact that they were extremely small sutures and the scissors were bothersome to her  #5: Depression: Patient does continue to have some symptoms of depression at times  Continues on trazodone  This is likely part of the reason why her weight is down  She does seem to be in reasonable enough spirits today, negative SI, positive contract  #6: Constipation: Patient requests discontinuation of stool softeners as she states she is not having any problems at all with constipation  I prefer to, rather, changed to as needed instead of scheduled  #7: Cognitive impairment: Noted previously  We'll reevaluate in the future  #8: Tobacco use: Recommend discontinue  She is smoking one cigarette per day  Medication SE Review and Pt Understands Tx: Possible side effects of new medications were reviewed with the patient/guardian today  The treatment plan was reviewed with the patient/guardian  The patient/guardian understands and agrees with the treatment plan      Chief Complaint  Chief Complaint Free Text Note Form: Pt here for routine visit- CKD stage 3,Protein -calorie mal, cognitive Imp  Pt report that she fell last Tues returning from dinner to her apartment  Pt hit her chin and was seen at [facility]  Pt daughter Norma Ramirez present today  She asked if the wound could be checked while at OV for possible removal of sutures  History of Present Illness  HPI: Reviewed recent visit with urgent care  Patient did have a fall, and needed stitches  Protein calorie malnutrition: Patient is slightly increased in weight  She mentioned that she is not having a problem with eating or drinking at the moment  Patient did have the sutures placed on 3 February  Please see the note from urgent care  Doing relatively well otherwise at the moment  Review of Systems  Complete-Female:   Constitutional: No fever, no chills, feels well, no tiredness, no recent weight gain or weight loss     Eyes: No complaints of eye pain, no red eyes, no eyesight problems, no discharge, no dry eyes, no itching of eyes  ENT: no complaints of earache, no loss of hearing, no nose bleeds, no nasal discharge, no sore throat, no hoarseness  Cardiovascular: No complaints of slow heart rate, no fast heart rate, no chest pain, no palpitations, no leg claudication, no lower extremity edema  Respiratory: No complaints of shortness of breath, no wheezing, no cough, no SOB on exertion, no orthopnea, no PND  Gastrointestinal: No complaints of abdominal pain, no constipation, no nausea or vomiting, no diarrhea, no bloody stools  Genitourinary: No complaints of dysuria, no incontinence, no pelvic pain, no dysmenorrhea, no vaginal discharge or bleeding  Musculoskeletal: No complaints of arthralgias, no myalgias, no joint swelling or stiffness, no limb pain or swelling  Integumentary: No complaints of skin rash or lesions, no itching, no skin wounds, no breast pain or lump  Neurological: No complaints of headache, no confusion, no convulsions, no numbness, no dizziness or fainting, no tingling, no limb weakness, no difficulty walking  Psychiatric: Not suicidal, no sleep disturbance, no anxiety or depression, no change in personality, no emotional problems  Endocrine: No complaints of proptosis, no hot flashes, no muscle weakness, no deepening of the voice, no feelings of weakness  Hematologic/Lymphatic: No complaints of swollen glands, no swollen glands in the neck, does not bleed easily, does not bruise easily  Active Problems    1  Back pain (724 5) (M54 9)   2  Chronic kidney disease, stage III (moderate) (585 3) (N18 3)   3  Cognitive impairment (294 9) (R41 89)   4  Constipation (564 00) (K59 00)   5  Depression (311) (F32 9)   6  GERD (gastroesophageal reflux disease) (530 81) (K21 9)   7  Hearing loss (389 9) (H91 90)   8  History of urinary frequency (V13 09) (Z87 898)   9  Hypokalemia (276 8) (E87 6)   10   Hypothyroidism (244 9) (E03 9) 11  Insomnia (780 52) (G47 00)   12  Iron deficiency anemia (280 9) (D50 9)   13  Laceration of chin without complication, initial encounter (873 44) (S01 81XA)   14  Loss of weight (783 21) (R63 4)   15  Mixed incontinence (788 33) (N39 46)   16  Muscle contusion (924 9) (T14 8)   17  Nonspecific abnormal results of function study of thyroid (794 5) (R94 6)   18  Osteoarthritis (715 90) (M19 90)   19  Osteoporosis (733 00) (M81 0)   20  Polydipsia (783 5) (R63 1)   21  Polyuria (788 42) (R35 8)   22  Protein-calorie malnutrition (263 9) (E46)   23  RLS (restless legs syndrome) (333 94) (G25 81)   24  Strain of knee, right, initial encounter (844 9) (N36 204Z)    Past Medical History    1  History of Endometrial cancer (182 0) (C54 1)   2  History of urinary frequency (V13 09) (Z87 898)   3  History of Left hip pain (719 45) (M25 552)   4  History of MCI (mild cognitive impairment) (331 83) (G31 84)   5  History of Osteoarthritis of hand (715 94) (M19 049)   6  History of Subclinical hypothyroidism (244 8) (E03 9)   7  History of Urinary incontinence (788 30) (R32)    Surgical History    1  History of Hysterectomy   2  History of Reported Hx Of Hip Replacement - Right Side  Surgical History Reviewed: The surgical history was reviewed and updated today  Family History    1  Family history of Breast cancer   2  Family history of Diabetes    3  Family history of Multiple sclerosis    4  Family history of Diabetes  Family History Reviewed: The family history was reviewed and updated today  Social History    · Alcohol use   · Current every day smoker (305 1) (F17 200)   · Daily caffeine consumption   · [employer] / community college student   ·   Social History Reviewed: The social history was reviewed and updated today  The social history was reviewed and is unchanged  Current Meds   1  Calcium 600 MG Oral Tablet; TAKE 1 TABLET DAILY;    Therapy: 96MJR8127 to (Myles Murillo)  Requested for: 29NZV9180; Last   Rx:25Nov2015 Ordered   2  Donepezil HCl - 10 MG Oral Tablet; take one tablet at bedtime; Therapy: 44BKO0083 to (Daniel Dang)  Requested for: 56LKM8207; Last   Rx:08Jan2016 Ordered   3  Ferrous Sulfate 325 (65 Fe) MG Oral Tablet Delayed Release; take one tablet at bedtime; Therapy: 38LSS6324 to (Catherine Ewing)  Requested for: 06WZQ9057; Last   Rx:29Jan2016 Ordered   4  Folic Acid 360 MCG Oral Tablet; TAKE 1 TABLET DAILY AS DIRECTED; Therapy: 65Rhm8941 to (Evaluate:08Uat5876)  Requested for: 96Emc4248; Last   Rx:12Aug2015 Ordered   5  Megestrol Acetate 625 MG/5ML Oral Suspension; TAKE 5 ML BY MOUTH ONCE DAILY; Therapy: 57LYM2127 to (Alfa Bass)  Requested for: 66HBC9229; Last   Rx:07Oct2015 Ordered   6  Mirtazapine 15 MG Oral Tablet; take one tablet at bedtime; Therapy: 21HLZ9848 to (Catherine Ewing)  Requested for: 38UJY9716; Last   Rx:29Jan2016 Ordered   7  Omeprazole 20 MG Oral Capsule Delayed Release; take 1 capsule daily; Therapy: 63XLG3736 to (Evaluate:18Nqx1588)  Requested for: 67Qwz0408; Last   Rx:42Tam7763 Ordered   8  Potassium Chloride ER 10 MEQ Oral Capsule Extended Release; take 2 capsules daily   as directed; Therapy: 46OWG2241 to (Daniel Dang)  Requested for: 95ZIK6897; Last   Rx:08Jan2016 Ordered   9  ROPINIRole HCl - 2 MG Oral Tablet; TAKE ONE TABLET AT [place] [address];   Therapy: 25IGE7316 to (Catherine Ewing)  Requested for: 14PDC7002; Last   Rx:29Jan2016 Ordered   10  Stool Softener 100 MG Oral Capsule; take 1 capsule two times a day; Therapy: 42CCL1730 to (Evaluate:81Qgw7505)  Requested for: 51Pyq5550; Last    Rx:86Ffw9904 Ordered   11  TraZODone HCl - 50 MG Oral Tablet; Take 1/2 tablet daily at bedtime; Therapy: 43KLM9805 to (Evaluate:36Kfv4033)  Requested for: 88GJQ3339; Last    Rx:08Jan2016 Ordered   12  Tylenol 325 MG Oral Tablet Recorded   13  Vitamin D3 1000 UNIT Oral Tablet;     Therapy: (Recorded:26Nov2014) to Recorded  Medication List Reviewed: The medication list was reviewed and updated today  Allergies    1  No Known Drug Allergies    Vitals  Signs [Data Includes: Current Encounter]   Recorded: 30NVR7006 10:43AM   Heart Rate: 72, L Radial  Respiration: 18  Systolic: 921, LUE, Sitting  Diastolic: 70, LUE, Sitting  Height: 5 ft 1 in  Weight: 93 lb 0 1 oz  BMI Calculated: 17 57  BSA Calculated: 1 36    Physical Exam    Constitutional   General appearance: No acute distress, well appearing and well nourished  Head and Face   Head and face: Abnormal   (4 sutures on the chin  These are removed without significant incident  They appear to be blue nylon sutures)   Pulmonary   Respiratory effort: No increased work of breathing or signs of respiratory distress  Auscultation of lungs: Clear to auscultation  Cardiovascular   Auscultation of heart: Normal rate and rhythm, normal S1 and S2, no murmurs  Examination of extremities for edema and/or varicosities: Normal          Signatures   Electronically signed by :  CAROLYNN Farfan ; Feb 10 2016 11:32AM EST                       (Author)

## 2018-01-16 NOTE — MISCELLANEOUS
Provider Comments  Provider Comments:   Spoke with patient daughter Susy Perla  Patient forgot that she had an appointment scheduled  Susy Perla R/S for 04/05/17 @11:15am      Signatures   Electronically signed by :  CAROLYNN Robertson ; Mar 23 2017  2:20PM EST

## 2018-01-16 NOTE — PROGRESS NOTES
Assessment  Assessed    1  Cognitive impairment (294 9) (R41 89)   2  Back pain (724 5) (M54 9)    Plan  Back pain    · Follow-up visit in 3 months Evaluation and Treatment  Follow-up  Status: Hold For -  Scheduling  Requested for: 70Mog9117    Discussion/Summary  Discussion Summary:   In summary we have a patient that has been taken off Aricept and Namenda with no marked deterioration in her cognition  In fact her bowels have improved since discontinuation of the medication  Her appetite also has improved according to the family  Counseling Documentation With Imm: The patient was counseled regarding instructions for management  total time of encounter was 30 minutes and 10 minutes was spent counseling  [id number]   Patient's Capacity to Self-Care: Patient is able to Self-Care  Geriatrics ADL [facility]: Toilet  Cares for self at toilet completely; no incontinence  Feeding:  Eats without assistance   Dressing: Dresses, undresses, and selects clothes from own wardrobe  Housekeeping: Always neatly dressed and well groomed, without assistance   Physical Ambulation: aPola Personist Goes about grounds or city  Bathing:  Bathes self ( tub,shower,sponge bath) without help  Geriatrics IADLs [facility]:   Ability to use telephone:  Operates telephone on own initiative; looks up and dials numbers, etc    Shopping  Needs to be accompanied on any shopping trip  Food Preparation: Needs to have meals prepared and served  Housekeeping: Needs help with all home maintenance tasks  Laundry:  Does personal laundry completely  Transportation Modes:  Travels on public transporation when assisted or accompanied by another  Ability to [employer]:  Incapable of handling money        Chief Complaint  Chief Complaint Free Text Note Form: Pt here for 4wk f/u- Cognitive impairment  Donepezil & Memantine D/C at last OV 07/12/17      History of Present Illness  HPI: Patient returns to the office accompanied by her daughter we had stopped Aricept and Namenda on her previous visit  She has noticed an improvement in her bowel she no longer is constipated  Family also noticed that her appetite improved  Cognition has not really been affected by stopping the medication  She will remain off the medication for now  Review of Systems  Complete-Female:   Constitutional: No fever, no chills, feels well, no tiredness, no recent weight gain or weight loss  Eyes: No complaints of eye pain, no red eyes, no eyesight problems, no discharge, no dry eyes, no itching of eyes  ENT: no complaints of earache, no loss of hearing, no nose bleeds, no nasal discharge, no sore throat, no hoarseness  Cardiovascular: No complaints of slow heart rate, no fast heart rate, no chest pain, no palpitations, no leg claudication, no lower extremity edema  Respiratory: No complaints of shortness of breath, no wheezing, no cough, no SOB on exertion, no orthopnea, no PND  Gastrointestinal: No complaints of abdominal pain, no constipation, no nausea or vomiting, no diarrhea, no bloody stools  Genitourinary: No complaints of dysuria, no incontinence, no pelvic pain, no dysmenorrhea, no vaginal discharge or bleeding  Musculoskeletal: No complaints of arthralgias, no myalgias, no joint swelling or stiffness, no limb pain or swelling  Integumentary: No complaints of skin rash or lesions, no itching, no skin wounds, no breast pain or lump  Neurological: No complaints of headache, no confusion, no convulsions, no numbness, no dizziness or fainting, no tingling, no limb weakness, no difficulty walking  Psychiatric: Not suicidal, no sleep disturbance, no anxiety or depression, no change in personality, no emotional problems  Endocrine: No complaints of proptosis, no hot flashes, no muscle weakness, no deepening of the voice, no feelings of weakness     Hematologic/Lymphatic: No complaints of swollen glands, no swollen glands in the neck, does not bleed easily, does not bruise easily  Geriatric Syndrome [facility]:   the memory was impaired  has no depression  no falls  no recent weight loss  vision impairment  hearing impairment  no incontinence  gait is normal     has no osteoporosis  with no delirium  no polypharmacy  Active Problems  Problems    1  Acute pain of right shoulder (719 41) (M25 511)   2  Ambulatory dysfunction (719 7) (R26 2)   3  Anemia (285 9) (D64 9)   4  Back pain (724 5) (M54 9)   5  Back pain (724 5) (M54 9)   6  Chronic kidney disease, stage III (moderate) (585 3) (N18 3)   7  Cognitive impairment (294 9) (R41 89)   8  Constipation (564 00) (K59 00)   9  Cough (786 2) (R05)   10  Depression (311) (F32 9)   11  Diarrhea (787 91) (R19 7)   12  DNR (do not resuscitate) discussion (V65 49) (Z71 89)   13  Encounter for home safety review for injury prevention (V65 43) (Z71 89)   14  Fall, initial encounter (E888 9) (W19 XXXA)   15  GERD (gastroesophageal reflux disease) (530 81) (K21 9)   16  Hearing loss (389 9) (H91 90)   17  History of urinary frequency (V13 09) (Z87 898)   18  Hypokalemia (276 8) (E87 6)   19  Hypothyroidism (244 9) (E03 9)   20  Insomnia (780 52) (G47 00)   21  Iron deficiency anemia (280 9) (D50 9)   22  Loss of weight (783 21) (R63 4)   23  Mixed incontinence (788 33) (N39 46)   24  Multiple falls (V15 88) (R29 6)   25  Muscle contusion (924 9) (T14 8)   26  Nonspecific abnormal results of function study of thyroid (794 5) (R94 6)   27  Open wound of left upper arm (880 03) (S41 102A)   28  Osteoarthritis (715 90) (M19 90)   29  Osteoporosis (733 00) (M81 0)   30  Physician orders for life-sustaining treatment (POLST) form indicates patient wish for    do-not-resuscitate status (V49 86) (Z66)   31  Polydipsia (783 5) (R63 1)   32  Polyuria (788 42) (R35 8)   33  Protein-calorie malnutrition (263 9) (E46)   34  Right shoulder pain (719 41) (M25 511)   35   RLS (restless legs syndrome) (333 94) (G25 81)   36  Short-term memory loss (780 93) (R41 3)   37  Skin irritation (709 9) (R23 8)   38  Strain of knee, right, initial encounter (844 9) (S86 911A)   39  Wrist pain, left (719 43) (M25 532)    Past Medical History  Problems    1  History of Abrasion of right upper extremity, initial encounter (912 0) (S40 811A)   2  History of Closed displaced fracture of acromial end of right clavicle with routine healing   (V54 19) (S42 031D)   3  History of Closed fracture of one rib of left side, initial encounter (807 01) (S22 32XA)   4  History of Endometrial cancer (182 0) (C54 1)   5  History of urinary frequency (V13 09) (Z87 898)   6  History of Laceration of chin without complication, initial encounter (873 44) (S01 81XA)   7  History of Left hip pain (719 45) (M25 552)   8  History of MCI (mild cognitive impairment) (331 83) (G31 84)   9  History of Osteoarthritis of hand (715 94) (M19 049)   10  History of Subclinical hypothyroidism (244 8) (E03 9)   11  History of Urinary incontinence (788 30) (R32)    Surgical History  Problems    1  History of Hysterectomy   2  History of Reported Hx Of Hip Replacement - Right Side  Surgical History Reviewed: The surgical history was reviewed and updated today  Family History  Mother    1  Family history of Breast cancer   2  Family history of Diabetes  Father    3  Family history of Multiple sclerosis  Uncle    4  Family history of Diabetes  Family History Reviewed: The family history was reviewed and updated today  Social History  Problems    · Alcohol use   · Current every day smoker (305 1) (F17 200)   · Daily caffeine consumption   · [employer] / community college student   ·   Social History Reviewed: The social history was reviewed and updated today  Current Meds   1  Acetaminophen 325 MG Oral Tablet; take 2 tablet by mouth twice a day PRN; Therapy: (Recorded:03Oct2016) to Recorded   2   Calcium 600 MG Oral Tablet; TAKE 1 TABLET DAILY; Therapy: 09QKV6704 to (Evaluate:01Jan2017)  Requested for: 13XPV8895; Last   QM:81JSG6421 Ordered   3   MG Oral Capsule; take 1 tablet by mouth daily; Therapy: (Elijah Miranda) to Recorded   4  Ferrous Sulfate 325 (65 Fe) MG Oral Tablet Delayed Release; take one tablet at bedtime; Therapy: 52BLY5033 to (Evaluate:15Fct3783)  Requested for: 30Jun2017; Last   Rx:55Kpz3063 Ordered   5  Folic Acid 913 MCG Oral Tablet; TAKE 1 TABLET DAILY AS DIRECTED; Therapy: 39Zbc6869 to (0488 71 46 12)  Requested for: 30Jun2017; Last   Rx:30Jun2017 Ordered   6  Levothyroxine Sodium 25 MCG Oral Tablet; Take one daily in the morning one hour   before breakfast;   Therapy: 65UEZ9846 to (Last Rx:25May2017)  Requested for: 34UXO5434 Ordered   7  Milk of Magnesia SUSP; ADMINISTER 30ML PO PRN ONE TIME DAILY; Therapy: (Elijah Miranda) to Recorded   8  Mirtazapine 15 MG Oral Tablet; take one tablet at bedtime; Therapy: 71BLK1007 to (Evaluate:22Sep2017)  Requested for: 86LEA6535; Last   Rx:49Sdu5605 Ordered   9  Multi Vitamin Daily Oral Tablet; TAKE 1 TABLET DAILY; Therapy: 06Srg7038 to (Evaluate:06Apr2017)  Requested for: 60Qwj5189; Last   Rx:72Aqb6222 Ordered   10  Potassium Chloride ER 10 MEQ Oral Capsule Extended Release; TAKE 2 CAPSULES    AS DIRECTED; Therapy: 63ZRD7234 to (Evaluate:18Ewz7930)  Requested for: 99SYK5566; Last    Rx:10Aug2017 Ordered   11  ROPINIRole HCl - 2 MG Oral Tablet; TAKE ONE TABLET AT INVSEJK56;    Therapy: 40KNO4834 to (Evaluate:27Erj8012)  Requested for: 08QVA5654; Last    Rx:77Oxf4818 Ordered   12  Silver Sulfadiazine 1 % External Cream; APPLY  AND RUB  IN A THIN FILM TO AFFECTED    AREAS TWICE DAILY  (AM AND PM) x 10 days; Therapy: 09Rxi5495 to (Last Rx:21Apr2017)  Requested for: 13BKO0198 Ordered   13  TraMADol HCl - 50 MG Oral Tablet; TAKE 0 5 TABLET EVERY 6HRS PRN; Therapy: 82Bqs1414 to (Last Rx:10Vwx2038) Ordered   14  TraZODone HCl - 50 MG Oral Tablet;  Take 1/2 tablet daily at bedtime; Therapy: 46RAO1379 to (Evaluate:25Gwv7210)  Requested for: 67UGK0184; Last    Rx:01Jun2017 Ordered   15  Vitamin C TABS; TAKE  2 GUMMIES(125MG) DAILY; Therapy: (Recorded:43Ycl3121) to Recorded  Medication List Reviewed: The medication list was reviewed and updated today  Allergies  Medication    1  Omeprazole CPDR   2  Megace ES SUSP    Vitals  Signs   Recorded: 22Aug2017 12:03PM   Heart Rate: 81, R Radial  Systolic: 517, RUE, Sitting  Diastolic: 66, RUE, Sitting  Weight: 92 lb 0 3 oz  BMI Calculated: 17 39  BSA Calculated: 1 36  O2 Saturation: 95    Physical Exam  General Multi-System Exam [facility]:   Constitutional   General appearance: No acute distress, well appearing and well nourished  Head and Face   Head and face: Normal     Eyes   Conjunctiva and lids: No erythema, swelling or discharge  Pupils and irises: Equal, round, reactive to light  wears corrective lenses on the left  EOM: normal    Ears, Nose, Mouth, and Throat   Palpation of the face and sinuses: No sinus tenderness  Otoscopic examination: Tympanic membranes translucent with normal light reflex  Canals patent without erythema  Hearing: Abnormal     Conversational Hearing: Abnormal     Neck   Neck: Supple, symmetric, trachea midline, no masses  Thyroid: Normal, no thyromegaly  Pulmonary   Respiratory effort: No increased work of breathing or signs of respiratory distress  Percussion of chest: Normal     Cardiovascular   Auscultation of heart: Normal rate and rhythm, normal S1 and S2, no murmurs  Carotid pulses: 2+ bilaterally  Abdominal aorta: Normal     Abdomen   Abdomen: Non-tender, no masses  Liver and spleen: No hepatomegaly or splenomegaly  Lymphatic   Palpation of lymph nodes in neck: No lymphadenopathy  Palpation of lymph nodes in axillae: No lymphadenopathy      Musculoskeletal   Gait and station: Normal     Inspection/palpation of digits and nails: Normal without clubbing or cyanosis  Skin   Skin and subcutaneous tissue: Normal without rashes or lesions  Neurologic   Cranial nerves: Cranial nerves 2-12 intact           Signatures   Electronically signed by : CAROLYNN Avelar ; Aug 22 2017 12:26PM EST                       (Author)

## 2018-01-17 NOTE — PROGRESS NOTES
Assessment    1  Cognitive impairment (294 9) (R41 89)   2  Depression (311) (F32 9)   3  Back pain (724 5) (M54 9)   4  Osteoporosis (733 00) (M81 0)    Plan    · Donepezil HCl - 10 MG Oral Tablet   · Memantine HCl - 10 MG Oral Tablet (Namenda)   · Follow-up Visit in 4 Weeks Evaluation and Treatment  Follow-up  Status: Hold For -  Scheduling  Requested for: 93Gcp1953    Discussion/Summary  Discussion Summary:   Patient returns to the office for evaluation for her cognitive impairment, abnormal sleep cycles, restless leg syndrome  I did a mini cog on her today she was able to do the clock but had difficulty remembering 3 words  Because of her lack of appetite I will discontinue Aricept and Namenda and reassess her in one month to see if there is a decline and also to see there is an improvement in her appetite  The patient also has history of osteoporosis and received a Prolia injection today  Counseling Documentation With Imm: The patient, patient's family was counseled regarding prognosis, patient and family education, risks and benefits of treatment options  Immunization Counseling Total number of vaccine components counseled:  total time of encounter was 45 minutes and 15 minutes was spent counseling  Chief Complaint  Chief Complaint Free Text Note Form: Pt here for 3mth routine- PCM, Gait dysfunction, Osteoporosis, Insomnia, Cognitive Imp  Prolia med previously due was reschedule per Violet(daughter)   Administer today while at OV      History of Present Illness  HPI: This is a 80year old  female who is accompanied by her daughter who has noted decline in her cognition in the last 6 months with a history of anxiety  client verbalizes short term memory loss and need to frequently recheck details  additional history of restless leg syndrome and having difficulty sleeping at night, reports sleeping more into the day time   Children take care of finances and living well states no compression or resuscitation  Daughter and patient report limited oral intake of food, primarily eating a ensure shake and a meal at dinner time, sometime will snack on cheese and crackers  weight is stable post previous weight loss then weight gain  Review of Systems  Complete-Female:   Constitutional: feeling poorly, but No fever, no chills, feels well, no tiredness, no recent weight gain or weight loss  Eyes: No complaints of eye pain, no red eyes, no eyesight problems, no discharge, no dry eyes, no itching of eyes  ENT: no complaints of earache, no loss of hearing, no nose bleeds, no nasal discharge, no sore throat, no hoarseness  Cardiovascular: No complaints of slow heart rate, no fast heart rate, no chest pain, no palpitations, no leg claudication, no lower extremity edema  Respiratory: No complaints of shortness of breath, no wheezing, no cough, no SOB on exertion, no orthopnea, no PND  Gastrointestinal: incontinence, but No complaints of abdominal pain, no constipation, no nausea or vomiting, no diarrhea, no bloody stools  Genitourinary: incontinence, but No complaints of dysuria, no incontinence, no pelvic pain, no dysmenorrhea, no vaginal discharge or bleeding  Musculoskeletal: No complaints of arthralgias, no myalgias, no joint swelling or stiffness, no limb pain or swelling  Integumentary: No complaints of skin rash or lesions, no itching, no skin wounds, no breast pain or lump  Neurological: confusion and restless leg, but No complaints of headache, no confusion, no convulsions, no numbness, no dizziness or fainting, no tingling, no limb weakness, no difficulty walking  Psychiatric: Not suicidal, no sleep disturbance, no anxiety or depression, no change in personality, no emotional problems  Endocrine: No complaints of proptosis, no hot flashes, no muscle weakness, no deepening of the voice, no feelings of weakness     Hematologic/Lymphatic: No complaints of swollen glands, no swollen glands in the neck, does not bleed easily, does not bruise easily  Geriatric Syndrome Theador Reach:   the memory was impaired  has no depression  no falls  no recent weight loss  no vision impairment  hearing impairment  incontinence  gait is abnormal     has osteoporosis  with no delirium  polypharmacy  Active Problems    1  Acute pain of right shoulder (719 41) (M25 511)   2  Ambulatory dysfunction (719 7) (R26 2)   3  Anemia (285 9) (D64 9)   4  Back pain (724 5) (M54 9)   5  Back pain (724 5) (M54 9)   6  Chronic kidney disease, stage III (moderate) (585 3) (N18 3)   7  Cognitive impairment (294 9) (R41 89)   8  Constipation (564 00) (K59 00)   9  Cough (786 2) (R05)   10  Depression (311) (F32 9)   11  Diarrhea (787 91) (R19 7)   12  DNR (do not resuscitate) discussion (V65 49) (Z71 89)   13  Encounter for home safety review for injury prevention (V65 43) (Z71 89)   14  Fall, initial encounter (E888 9) (W19 XXXA)   15  GERD (gastroesophageal reflux disease) (530 81) (K21 9)   16  Hearing loss (389 9) (H91 90)   17  History of urinary frequency (V13 09) (Z87 898)   18  Hypokalemia (276 8) (E87 6)   19  Hypothyroidism (244 9) (E03 9)   20  Insomnia (780 52) (G47 00)   21  Iron deficiency anemia (280 9) (D50 9)   22  Loss of weight (783 21) (R63 4)   23  Mixed incontinence (788 33) (N39 46)   24  Multiple falls (V15 88) (R29 6)   25  Muscle contusion (924 9) (T14 8)   26  Nonspecific abnormal results of function study of thyroid (794 5) (R94 6)   27  Open wound of left upper arm (880 03) (S41 102A)   28  Osteoarthritis (715 90) (M19 90)   29  Osteoporosis (733 00) (M81 0)   30  Physician orders for life-sustaining treatment (POLST) form indicates patient wish for    do-not-resuscitate status (V49 86) (Z66)   31  Polydipsia (783 5) (R63 1)   32  Polyuria (788 42) (R35 8)   33  Protein-calorie malnutrition (263 9) (E46)   34  Right shoulder pain (719 41) (M25 511)   35   RLS (restless legs syndrome) (333 94) (G25 81)   36  Short-term memory loss (780 93) (R41 3)   37  Skin irritation (709 9) (R23 8)   38  Strain of knee, right, initial encounter (844 9) (S86 911A)   39  Wrist pain, left (719 43) (M25 532)    Past Medical History    1  History of Abrasion of right upper extremity, initial encounter (912 0) (S40 811A)   2  History of Closed displaced fracture of acromial end of right clavicle with routine healing   (V54 19) (S42 031D)   3  History of Closed fracture of one rib of left side, initial encounter (807 01) (S22 32XA)   4  History of Endometrial cancer (182 0) (C54 1)   5  History of urinary frequency (V13 09) (Z87 898)   6  History of Laceration of chin without complication, initial encounter (873 44) (S01 81XA)   7  History of Left hip pain (719 45) (M25 552)   8  History of MCI (mild cognitive impairment) (331 83) (G31 84)   9  History of Osteoarthritis of hand (715 94) (M19 049)   10  History of Subclinical hypothyroidism (244 8) (E03 9)   11  History of Urinary incontinence (788 30) (R32)    Surgical History    1  History of Hysterectomy   2  History of Reported Hx Of Hip Replacement - Right Side  Surgical History Reviewed: The surgical history was reviewed and updated today  Family History  Mother    1  Family history of Breast cancer   2  Family history of Diabetes  Father    3  Family history of Multiple sclerosis  Uncle    4  Family history of Diabetes  Family History Reviewed: The family history was reviewed and updated today  Social History    · Alcohol use   · Current every day smoker (305 1) (F17 200)   · Daily caffeine consumption   · [employer] / community college student   ·   Social History Reviewed: The social history was reviewed and updated today  The social history was reviewed and is unchanged  Current Meds   1  Acetaminophen 325 MG Oral Tablet; take 2 tablet by mouth twice a day PRN; Therapy: (Recorded:03Oct2016) to Recorded   2   Calcium 600 MG Oral Tablet; TAKE 1 TABLET DAILY; Therapy: 49ADT4897 to (Evaluate:01Jan2017)  Requested for: 95DLI7014; Last   GO:20PKA4070 Ordered   3  Desitin 13 % External Cream; APPLY AS NEEDED FOR PROTECTION PRN; Therapy: (Recorded:03Oct2016) to Recorded   4   MG Oral Capsule; take 1 tablet by mouth daily; Therapy: (Cristiane Parry) to Recorded   5  Donepezil HCl - 10 MG Oral Tablet; take one tablet at bedtime; Therapy: 13TDW6256 to (Evaluate:29Sep2017)  Requested for: 22KSQ0057; Last   Rx:01Jun2017 Ordered   6  Ferrous Sulfate 325 (65 Fe) MG Oral Tablet Delayed Release; take one tablet at bedtime; Therapy: 31BZM9938 to (Evaluate:22Sep2017)  Requested for: 30Jun2017; Last   Rx:96Rvt8810 Ordered   7  Folic Acid 886 MCG Oral Tablet; TAKE 1 TABLET DAILY AS DIRECTED; Therapy: 20Zhe8479 to (96 640570)  Requested for: 30Jun2017; Last   Rx:30Jun2017 Ordered   8  Levothyroxine Sodium 25 MCG Oral Tablet; Take one daily in the morning one hour   before breakfast;   Therapy: 92YEE8427 to (Last Rx:24Jru2780)  Requested for: 78FNQ4824 Ordered   9  Memantine HCl - 10 MG Oral Tablet; TAKE ONE TABLET BY MOUTH ONCE DAILY; Therapy: 65NWE0329 to (Ninoska Tan)  Requested for: 30Jun2017; Last   Rx:67Ncr7166 Ordered   10  Milk of Magnesia SUSP; ADMINISTER 30ML PO PRN ONE TIME DAILY; Therapy: (Cristiane Sohan) to Recorded   11  Mirtazapine 15 MG Oral Tablet; take one tablet at bedtime; Therapy: 89CON7676 to (Evaluate:48Rne0570)  Requested for: 85ZCK7295; Last    Rx:03Atv7753 Ordered   12  Multi Vitamin Daily Oral Tablet; TAKE 1 TABLET DAILY; Therapy: 52Fdn0477 to (Evaluate:06Apr2017)  Requested for: 16Ebk6402; Last    Rx:23Vol1413 Ordered   13  Potassium Chloride ER 10 MEQ Oral Capsule Extended Release; TAKE 2 CAPSULES    AS DIRECTED; Therapy: 81KQO9832 to (Evaluate:79Vgt0767)  Requested for: 37AMM3250; Last    Rx:16Mar2017 Ordered   14   ROPINIRole HCl - 2 MG Oral Tablet; TAKE ONE TABLET AT JJJUJTN99;    Therapy: 34LQR6344 to (Evaluate:82Hxn4724)  Requested for: 26CLS8449; Last    Rx:35Dvg5258 Ordered   15  Silver Sulfadiazine 1 % External Cream; APPLY  AND RUB  IN A THIN FILM TO AFFECTED    AREAS TWICE DAILY  (AM AND PM) x 10 days; Therapy: 21Apr2017 to (Last Rx:21Apr2017)  Requested for: 04GEN5707 Ordered   16  TraMADol HCl - 50 MG Oral Tablet; TAKE 0 5 TABLET EVERY 6HRS PRN; Therapy: 00Dtu6969 to (Last Rx:22Dec2016) Ordered   17  TraZODone HCl - 50 MG Oral Tablet; Take 1/2 tablet daily at bedtime; Therapy: 40TSS9942 to (Evaluate:29Sep2017)  Requested for: 60GQB2751; Last    Rx:01Jun2017 Ordered   18  Vitamin C TABS; TAKE  2 GUMMIES(125MG) DAILY; Therapy: (Recorded:88Zhz9889) to Recorded  Medication List Reviewed: The medication list was reviewed and updated today  Allergies    1  Omeprazole CPDR   2  Megace ES SUSP    Vitals  Signs   Recorded: 12Jul2017 12:24PM   Heart Rate: 62, R Radial  Respiration: 16  Systolic: 258, RUE, Sitting  Diastolic: 70, RUE, Sitting  Height: 5 ft 1 in  Weight: 92 lb   BMI Calculated: 17 38  BSA Calculated: 1 36  O2 Saturation: 95    Physical Exam    Constitutional   General appearance: Abnormal   cachetic  Head and Face   Head and face: Normal     Eyes   Conjunctiva and lids: No erythema, swelling or discharge  Pupils and irises: Equal, round, reactive to light  Ophthalmoscopic examination: Normal fundi and optic discs  Ears, Nose, Mouth, and Throat   External inspection of ears and nose: Normal     Otoscopic examination: Tympanic membranes translucent with normal light reflex  Canals patent without erythema  Hearing: Normal     Conversational Hearing: Normal     Nasal mucosa, septum, and turbinates: Normal without edema or erythema  Lips, teeth, and gums: Normal, good dentition  full plate upper  Oropharynx: Normal with no erythema, edema, exudate or lesions  Neck   Neck: Supple, symmetric, trachea midline, no masses      Thyroid: Normal, no thyromegaly  Pulmonary   Respiratory effort: No increased work of breathing or signs of respiratory distress  Percussion of chest: Normal     Palpation of chest: Normal     Auscultation of lungs: Clear to auscultation  Cardiovascular   Palpation of heart: Normal PMI, no thrills  Auscultation of heart: Normal rate and rhythm, normal S1 and S2, no murmurs  Carotid pulses: 2+ bilaterally  Abdominal aorta: Normal     Femoral pulses: 2+ bilaterally  Pedal pulses: 2+ bilaterally  Examination of extremities for edema and/or varicosities: Normal     Chest   Breasts: Normal, no dimpling or skin changes appreciated  Palpation of breasts and axillae: Normal, no masses palpated  Chest: Normal     Abdomen   Abdomen: Non-tender, no masses  Liver and spleen: No hepatomegaly or splenomegaly  Examination for hernias: No hernias appreciated  Anus, perineum, and rectum: Normal sphincter tone, no masses, no prolapse  Stool sample for occult blood: Negative  Genitourinary   External genitalia and vagina: Normal, no lesions appreciated  Urethra: Normal, no discharge  Bladder: Not distended, no tenderness  Cervix: Normal, no lesions  Uterus: Normal size, no tenderness, no masses  Adnexa/Parametria: Normal, no masses or tenderness  Lymphatic   Palpation of lymph nodes in neck: No lymphadenopathy  Palpation of lymph nodes in axillae: No lymphadenopathy  Palpation of lymph nodes in groin: No lymphadenopathy  Palpation of lymph nodes in other areas: No lymphadenopathy  Musculoskeletal   Gait and station: Normal     Inspection/palpation of digits and nails: Normal without clubbing or cyanosis  Inspection/palpation of joints, bones, and muscles: Normal     Range of motion: Normal     Stability: Normal     Muscle strength/tone: Normal     Skin   Skin and subcutaneous tissue: Normal without rashes or lesions      Palpation of skin and subcutaneous tissue: Normal turgor  Neurologic   Cranial nerves: Cranial nerves 2-12 intact  Reflexes: 2+ and symmetric  Sensation: No sensory loss  Psychiatric   Judgment and insight: Normal     Orientation to person, place and time: Normal     Recent and remote memory: Intact      Mood and affect: Normal        Signatures   Electronically signed by : CAROLYNN Murray ; Jul 12 2017  1:14PM EST                       (Author)

## 2018-01-22 VITALS
OXYGEN SATURATION: 97 % | SYSTOLIC BLOOD PRESSURE: 110 MMHG | DIASTOLIC BLOOD PRESSURE: 60 MMHG | HEART RATE: 69 BPM | WEIGHT: 92 LBS | RESPIRATION RATE: 16 BRPM | BODY MASS INDEX: 17.38 KG/M2

## 2018-01-22 VITALS
BODY MASS INDEX: 17.39 KG/M2 | DIASTOLIC BLOOD PRESSURE: 70 MMHG | RESPIRATION RATE: 16 BRPM | TEMPERATURE: 98.5 F | HEART RATE: 82 BPM | WEIGHT: 92.03 LBS | OXYGEN SATURATION: 96 % | SYSTOLIC BLOOD PRESSURE: 106 MMHG

## 2018-01-22 VITALS
BODY MASS INDEX: 17.37 KG/M2 | HEART RATE: 62 BPM | RESPIRATION RATE: 16 BRPM | SYSTOLIC BLOOD PRESSURE: 114 MMHG | WEIGHT: 92 LBS | OXYGEN SATURATION: 95 % | DIASTOLIC BLOOD PRESSURE: 70 MMHG | HEIGHT: 61 IN

## 2018-01-22 VITALS
OXYGEN SATURATION: 95 % | BODY MASS INDEX: 17.39 KG/M2 | DIASTOLIC BLOOD PRESSURE: 66 MMHG | WEIGHT: 92.02 LBS | HEART RATE: 81 BPM | SYSTOLIC BLOOD PRESSURE: 106 MMHG

## 2018-01-23 ENCOUNTER — GENERIC CONVERSION - ENCOUNTER (OUTPATIENT)
Dept: OTHER | Facility: OTHER | Age: 83
End: 2018-01-23

## 2018-01-24 NOTE — PROGRESS NOTES
Chief Complaint  Pt came to the office today to have Prolia administered  60mg was injected subcu in right upper arm  NDC# [id number] EXP# 04/20 LOT# [id number]  Pt tolerated meds ok and will return in 6mths for her next injection  Active Problems    1  Acute pain of right shoulder (719 41) (M25 511)   2  Ambulatory dysfunction (719 7) (R26 2)   3  Anemia (285 9) (D64 9)   4  Back pain (724 5) (M54 9)   5  Back pain (724 5) (M54 9)   6  Cerumen impaction (380 4) (H61 20)   7  Chronic kidney disease, stage III (moderate) (585 3) (N18 3)   8  Cognitive impairment (294 9) (R41 89)   9  Constipation (564 00) (K59 00)   10  Cough (786 2) (R05)   11  Depression (311) (F32 9)   12  Diarrhea (787 91) (R19 7)   13  DNR (do not resuscitate) discussion (V65 49) (Z71 89)   14  Encounter for home safety review for injury prevention (V65 43) (Z71 89)   15  Fall, initial encounter (E888 9) (W19 XXXA)   16  GERD (gastroesophageal reflux disease) (530 81) (K21 9)   17  Hearing loss (389 9) (H91 90)   18  History of urinary frequency (V13 09) (Z87 898)   19  Hypokalemia (276 8) (E87 6)   20  Hypothyroidism (244 9) (E03 9)   21  Insomnia (780 52) (G47 00)   22  Iron deficiency anemia (280 9) (D50 9)   23  Loss of weight (783 21) (R63 4)   24  Mixed incontinence (788 33) (N39 46)   25  Multiple falls (V15 88) (R29 6)   26  Muscle contusion (924 9) (T14 8XXA)   27  Nonspecific abnormal results of function study of thyroid (794 5) (R94 6)   28  Open wound of left upper arm (880 03) (S41 102A)   29  Osteoarthritis (715 90) (M19 90)   30  Osteoporosis (733 00) (M81 0)   31  Pain, acute due to trauma (338 11) (G89 11)   32  Physical deconditioning (799 3) (R53 81)   33  Physician orders for life-sustaining treatment (POLST) form indicates patient wish for    do-not-resuscitate status (V49 86) (Z66)   34  Polydipsia (783 5) (R63 1)   35  Polyuria (788 42) (R35 8)   36  Protein-calorie malnutrition (263 9) (E46)   37   Right shoulder pain (719 41) (M25 511)   38  RLS (restless legs syndrome) (333 94) (G25 81)   39  Short-term memory loss (780 93) (R41 3)   40  Skin irritation (709 9) (R23 8)   41  Strain of knee, right, initial encounter (844 9) (S86 911A)   42  Wrist pain, left (719 43) (M25 532)    Current Meds   1  Acetaminophen 500 MG Oral Tablet; TAKE 2 TABLET Every 8 hours x 10 days; Therapy: 27LQS8052 to (Evaluate:98Uvq0051) Recorded   2  Bengay Ultra Strength 5 % External Patch; APPLY 1 PATCH Every morning to left rib area   in am, off 12hrs later; Therapy: 03CJA0110 to Recorded   3  Calcium 600 MG Oral Tablet; TAKE 1 TABLET DAILY; Therapy: 94XIJ2278 to (Evaluate:2017)  Requested for: 31NCI9919; Last   W31MRF6852 Ordered   4  Debrox 6 5 % Otic Solution; apply 5 drops twice daily in both ears x 4 days; Therapy: 35QGM9986 to (Last Gertrude Love)  Requested for: 2017; Status: ACTIVE   - Retrospective By Protocol Authorization Ordered   5   MG Oral Capsule; take 1 tablet by mouth daily; Therapy: (Teodoro Moss to Recorded   6  Ferrous Sulfate 325 (65 Fe) MG Oral Tablet Delayed Release; take one tablet at   bedtime; Therapy: 96HMR5920 to (Evaluate:2017)  Requested for: 2017; Last   Rx:91Ezi2867 Ordered   7  Folic Acid 817 MCG Oral Tablet; TAKE 1 TABLET DAILY AS DIRECTED; Therapy: 16Kjx3671 to (21 )  Requested for: 2017; Last   Rx:2017 Ordered   8  Levothyroxine Sodium 25 MCG Oral Tablet; Take 1 tablet daily in the morning 1 hour   before breakfast;   Therapy: 90VNI1878 to (Evaluate:2018)  Requested for: 45TVF7746; Last   Rx:2017 Ordered   9  Milk of Magnesia SUSP; ADMINISTER 30ML PO PRN ONE TIME DAILY; Therapy: (Teodoro Moss to Recorded   10  Mirtazapine 15 MG Oral Tablet; take one tablet at bedtime; Therapy: 03YCE2075 to (005 070 152)  Requested for: 08SLD9403; Last    Rx:94Sgn9232 Ordered   11   Multi Vitamin Daily Oral Tablet; TAKE 1 TABLET DAILY; Therapy: 30Jkp1412 to (Evaluate:06Apr2017)  Requested for: 92Sju8675; Last    Rx:02Cid4590 Ordered   12  Potassium Chloride ER 10 MEQ Oral Capsule Extended Release; TAKE 2 CAPSULES    AS DIRECTED; Therapy: 99QZR0415 to (Evaluate:37Plt9646)  Requested for: 39TGD4424; Last    Rx:10Aug2017 Ordered   13  Prolia 60 MG/ML Subcutaneous Solution; INJECT SUBCUTANEOUSLY  60 MG / 1 ML    EVERY 6 MONTHS; Therapy: 35JPG4894 to Recorded   14  ROPINIRole HCl - 2 MG Oral Tablet; TAKE ONE TABLET AT ZBJASXW76;    Therapy: 39MFF5425 to ([facility]:32KBV7078)  Requested for: 26OIR3828; Last    Rx:05Qbf8584 Ordered   15  Silver Sulfadiazine 1 % External Cream; APPLY  AND RUB  IN A THIN FILM TO    AFFECTED AREAS TWICE DAILY  (AM AND PM) x 10 days; Therapy: 21Apr2017 to (Last Rx:21Apr2017)  Requested for: 18FAF8092 Ordered   16  TraZODone HCl - 50 MG Oral Tablet; Take 1/2 tablet daily at bedtime; Therapy: 95FLW5418 to (Evaluate:25Jan2018)  Requested for: 71BLW1737; Last    Rx:56Kts0238 Ordered   17  Vitamin C TABS; TAKE  2 GUMMIES(125MG) DAILY; Therapy: (Recorded:09Elz1049) to Recorded    Allergies    1  Omeprazole CPDR   2  Megace ES SUSP    Plan  Unlinked    · Prolia 60 MG/ML Subcutaneous Solution    Signatures   Electronically signed by :  Sophy Bonilla, ; Jan 23 2018 11:54AM EST                       (Author)

## 2018-02-09 RX ORDER — POTASSIUM CHLORIDE 750 MG/1
CAPSULE, EXTENDED RELEASE ORAL
COMMUNITY
Start: 2014-12-31 | End: 2018-02-09 | Stop reason: SDUPTHER

## 2018-02-09 RX ORDER — DOCUSATE SODIUM 100 MG/1
1 CAPSULE, LIQUID FILLED ORAL DAILY
COMMUNITY
End: 2018-02-09 | Stop reason: SDUPTHER

## 2018-02-09 RX ORDER — LANOLIN ALCOHOL/MO/W.PET/CERES
1 CREAM (GRAM) TOPICAL DAILY
COMMUNITY
Start: 2015-04-13 | End: 2018-02-09 | Stop reason: SDUPTHER

## 2018-02-09 RX ORDER — MIRTAZAPINE 15 MG/1
1 TABLET, FILM COATED ORAL
COMMUNITY
Start: 2012-04-25 | End: 2018-02-09 | Stop reason: SDUPTHER

## 2018-02-09 RX ORDER — ROPINIROLE 2 MG/1
TABLET, FILM COATED ORAL
COMMUNITY
Start: 2012-01-05 | End: 2018-02-09 | Stop reason: SDUPTHER

## 2018-02-09 RX ORDER — FERROUS SULFATE 325(65) MG
1 TABLET ORAL
COMMUNITY
Start: 2015-02-18 | End: 2018-02-09 | Stop reason: SDUPTHER

## 2018-02-09 RX ORDER — LEVOTHYROXINE SODIUM 0.03 MG/1
TABLET ORAL
COMMUNITY
Start: 2016-11-07 | End: 2018-02-09 | Stop reason: SDUPTHER

## 2018-02-09 RX ORDER — TRAZODONE HYDROCHLORIDE 50 MG/1
TABLET ORAL
COMMUNITY
Start: 2014-12-08 | End: 2018-02-09 | Stop reason: SDUPTHER

## 2018-02-09 RX ORDER — PHENOL 1.4 %
1 AEROSOL, SPRAY (ML) MUCOUS MEMBRANE DAILY
COMMUNITY
Start: 2015-11-25 | End: 2018-02-09 | Stop reason: SDUPTHER

## 2018-02-09 RX ORDER — MULTIVITAMIN
1 TABLET ORAL DAILY
COMMUNITY
Start: 2016-04-11 | End: 2018-02-09 | Stop reason: SDUPTHER

## 2018-02-12 ENCOUNTER — OFFICE VISIT (OUTPATIENT)
Dept: GERIATRICS | Facility: CLINIC | Age: 83
End: 2018-02-12
Payer: MEDICARE

## 2018-02-12 VITALS
SYSTOLIC BLOOD PRESSURE: 112 MMHG | RESPIRATION RATE: 16 BRPM | TEMPERATURE: 98.1 F | DIASTOLIC BLOOD PRESSURE: 60 MMHG | WEIGHT: 89.3 LBS | HEART RATE: 68 BPM | BODY MASS INDEX: 16.87 KG/M2

## 2018-02-12 DIAGNOSIS — N18.30 CHRONIC KIDNEY DISEASE, STAGE III (MODERATE) (HCC): ICD-10-CM

## 2018-02-12 DIAGNOSIS — G25.81 RLS (RESTLESS LEGS SYNDROME): ICD-10-CM

## 2018-02-12 DIAGNOSIS — K21.9 GASTROESOPHAGEAL REFLUX DISEASE, ESOPHAGITIS PRESENCE NOT SPECIFIED: ICD-10-CM

## 2018-02-12 DIAGNOSIS — E03.8 OTHER SPECIFIED HYPOTHYROIDISM: Primary | ICD-10-CM

## 2018-02-12 DIAGNOSIS — M81.0 AGE-RELATED OSTEOPOROSIS WITHOUT CURRENT PATHOLOGICAL FRACTURE: ICD-10-CM

## 2018-02-12 DIAGNOSIS — M79.89 SWELLING OF LEFT HAND: ICD-10-CM

## 2018-02-12 PROBLEM — R41.3 SHORT-TERM MEMORY LOSS: Status: ACTIVE | Noted: 2017-02-28

## 2018-02-12 PROBLEM — D64.9 ANEMIA: Status: ACTIVE | Noted: 2017-06-30

## 2018-02-12 PROBLEM — R26.2 AMBULATORY DYSFUNCTION: Status: ACTIVE | Noted: 2017-02-14

## 2018-02-12 PROCEDURE — 99214 OFFICE O/P EST MOD 30 MIN: CPT | Performed by: INTERNAL MEDICINE

## 2018-02-12 NOTE — PATIENT INSTRUCTIONS
1 -ultrasound of her left hand    2 -follow-up after ultrasound of her left hand    3  -TSH in 3 months

## 2018-02-12 NOTE — PROGRESS NOTES
Assessment/Plan:  1 -swelling of left hand -will order an ultrasound of that area to determine consistency  2 -restless leg syndrome  Stable at present on current treatment plan    3 -hypothyroidism -will order a TSH prior to her next visit    4 -osteoporosis -she is to continue on her Prolia injections  No problem-specific Assessment & Plan notes found for this encounter  Diagnoses and all orders for this visit:    Other specified hypothyroidism  -     TSH, 3rd generation with T4 reflex; Future    Gastroesophageal reflux disease, esophagitis presence not specified    Swelling of left hand  -     US extremity soft tissue; Future    Chronic kidney disease, stage III (moderate)    RLS (restless legs syndrome)    Age-related osteoporosis without current pathological fracture    Other orders  -     Menthol (BENGAY ULTRA STRENGTH) 5 % PTCH; Apply topically  -     Discontinue: calcium carbonate (CALCIUM 600) 600 MG tablet; Take 1 tablet by mouth daily  -     Discontinue: Multiple Vitamin (DAILY JAMES) TABS; Take 1 tablet by mouth daily  -     Discontinue: ferrous sulfate 325 (65 Fe) mg tablet; Take 1 tablet by mouth  -     denosumab (PROLIA) 60 mg/mL; Inject under the skin every 6 (six) months  -     Discontinue: folic acid (FOLVITE) 873 mcg tablet; Take 1 tablet by mouth daily  -     Discontinue: docusate sodium (DOK) 100 mg capsule; Take 1 tablet by mouth daily  -     Discontinue: levothyroxine 25 mcg tablet; Take by mouth  -     Discontinue: magnesium hydroxide (MILK OF MAGNESIA) 400 mg/5 mL oral suspension; Take by mouth  -     Discontinue: mirtazapine (REMERON) 15 mg tablet; Take 1 tablet by mouth  -     Discontinue: potassium chloride (MICRO-K) 10 MEQ CR capsule; Take by mouth  -     Discontinue: rOPINIRole (REQUIP) 2 mg tablet; Take by mouth  -     Discontinue: traZODone (DESYREL) 50 mg tablet;  Take by mouth            Subjective:  Patient comes to the office for routine follow-up with a chief complaint of swelling of her left hand     Patient ID: Joseline Howard is a 80 y o  female  Patient is an 28-year-old  female who returns to the office with a chief complaint of a lump in her left hand that has been present for approximately 1 month  The patient states that initially she had numbness in the her hands, occupational therapy did seem to improve her symptoms however she developed a lump in her left hand  This lump was not painful to touch but it did present as a change in her anatomy  It patient is also followed for hypothyroidism and restless leg syndrome  The following portions of the patient's history were reviewed and updated as appropriate:   She  has a past medical history of Closed displaced fracture of acromial end of left clavicle with routine healing; Disease of thyroid gland; Endometrial cancer ([address] ); Hip pain, left; History of rib fracture; MCI (mild cognitive impairment); Osteoarthritis of hand; Urinary frequency; and Urinary incontinence  She  does not have any pertinent problems on file  She  has a past surgical history that includes Hysterectomy  Her family history includes Breast cancer in her mother; Diabetes in her mother; Multiple sclerosis in her father  She  reports that she has been smoking Cigarettes  She has been smoking about 0 20 packs per day  She has never used smokeless tobacco  She reports that she drinks about 4 2 oz of alcohol per week   She reports that she does not use drugs  Current Outpatient Prescriptions   Medication Sig Dispense Refill    denosumab (PROLIA) 60 mg/mL Inject under the skin every 6 (six) months      Menthol (BENGAY ULTRA STRENGTH) 5 % PTCH Apply topically       No current facility-administered medications for this visit  No current outpatient prescriptions on file prior to visit  No current facility-administered medications on file prior to visit  She is allergic to megestrol and omeprazole       Review of Systems Constitutional: Negative  HENT: Negative  Eyes: Negative  Respiratory: Negative  Cardiovascular: Negative  Gastrointestinal: Negative  Endocrine: Negative  Genitourinary: Negative  Musculoskeletal: Negative  Skin: Negative  Allergic/Immunologic: Negative  Neurological: Negative  Hematological: Negative  Psychiatric/Behavioral: Negative  Objective:    Vitals:    02/12/18 0948   BP: 112/60   Pulse: 68   Resp: 16   Temp: 98 1 °F (36 7 °C)        Physical Exam   Constitutional: She is oriented to person, place, and time  She appears well-developed and well-nourished  HENT:   Head: Normocephalic and atraumatic  Eyes: Conjunctivae and EOM are normal  Pupils are equal, round, and reactive to light  Neck: Normal range of motion  Neck supple  Cardiovascular: Normal rate, regular rhythm and normal heart sounds  Pulmonary/Chest: Effort normal and breath sounds normal    Abdominal: Soft  Bowel sounds are normal    Musculoskeletal: Normal range of motion  Patient has an abnormal growth on the dorsal surface of her left hand soft to touch measures approximately 3 cm in width and length  It feels like a fatty tumor or a hematoma  Neurological: She is alert and oriented to person, place, and time  She has normal reflexes  Skin: Skin is warm  Psychiatric: She has a normal mood and affect   Her behavior is normal  Judgment and thought content normal

## 2018-03-14 ENCOUNTER — OFFICE VISIT (OUTPATIENT)
Dept: GERIATRICS | Facility: CLINIC | Age: 83
End: 2018-03-14
Payer: MEDICARE

## 2018-03-14 VITALS
DIASTOLIC BLOOD PRESSURE: 68 MMHG | BODY MASS INDEX: 16.93 KG/M2 | SYSTOLIC BLOOD PRESSURE: 102 MMHG | RESPIRATION RATE: 16 BRPM | HEART RATE: 71 BPM | OXYGEN SATURATION: 95 % | WEIGHT: 89.6 LBS

## 2018-03-14 DIAGNOSIS — M79.89 SWELLING OF LEFT HAND: ICD-10-CM

## 2018-03-14 DIAGNOSIS — M81.0 AGE-RELATED OSTEOPOROSIS WITHOUT CURRENT PATHOLOGICAL FRACTURE: ICD-10-CM

## 2018-03-14 DIAGNOSIS — E03.8 OTHER SPECIFIED HYPOTHYROIDISM: Primary | ICD-10-CM

## 2018-03-14 PROCEDURE — 99214 OFFICE O/P EST MOD 30 MIN: CPT | Performed by: INTERNAL MEDICINE

## 2018-03-14 RX ORDER — ACETAMINOPHEN 325 MG/1
TABLET ORAL
COMMUNITY
End: 2019-02-01 | Stop reason: SDUPTHER

## 2018-03-14 RX ORDER — PHENOL 1.4 %
600 AEROSOL, SPRAY (ML) MUCOUS MEMBRANE DAILY
COMMUNITY

## 2018-03-14 RX ORDER — FERROUS SULFATE 325(65) MG
325 TABLET ORAL
COMMUNITY

## 2018-03-14 RX ORDER — POTASSIUM CHLORIDE 750 MG/1
10 CAPSULE, EXTENDED RELEASE ORAL 2 TIMES DAILY
COMMUNITY
End: 2020-08-07 | Stop reason: CLARIF

## 2018-03-14 RX ORDER — LANOLIN ALCOHOL/MO/W.PET/CERES
400 CREAM (GRAM) TOPICAL DAILY
COMMUNITY

## 2018-03-14 RX ORDER — MULTIVITAMIN
TABLET ORAL DAILY
COMMUNITY

## 2018-03-14 RX ORDER — LEVOTHYROXINE SODIUM 0.03 MG/1
25 TABLET ORAL DAILY
COMMUNITY
End: 2018-07-23 | Stop reason: ALTCHOICE

## 2018-03-14 RX ORDER — DOCUSATE SODIUM 100 MG/1
100 CAPSULE, LIQUID FILLED ORAL DAILY
COMMUNITY
End: 2019-11-26 | Stop reason: ALTCHOICE

## 2018-03-14 RX ORDER — MIRTAZAPINE 15 MG/1
7.5 TABLET, FILM COATED ORAL
COMMUNITY

## 2018-03-14 RX ORDER — TRAZODONE HYDROCHLORIDE 50 MG/1
TABLET ORAL
COMMUNITY
End: 2018-06-22

## 2018-03-14 RX ORDER — ROPINIROLE 2 MG/1
2 TABLET, FILM COATED ORAL DAILY
COMMUNITY
End: 2020-03-03 | Stop reason: ALTCHOICE

## 2018-03-14 NOTE — PROGRESS NOTES
Assessment/Plan:  1 -Hypothyroidism -patient no longer carries this diagnosis her TSH is well within normal range without medication  2 -swelling of left hand -suspect fluid collection secondary to trauma  3 -age-related osteoporosis  -currently on Prolia injections  No problem-specific Assessment & Plan notes found for this encounter  Diagnoses and all orders for this visit:    Other specified hypothyroidism    Age-related osteoporosis without current pathological fracture    Swelling of left hand    Other orders  -     calcium carbonate (OS-CHLOÉ) 600 MG tablet; Take 600 mg by mouth daily  -     Multiple Vitamin (DAILY JAMES) TABS; Take by mouth daily  -     docusate sodium (COLACE) 100 mg capsule; Take 100 mg by mouth daily  -     Nutritional Supplements (ENSURE ACTIVE) LIQD; Take by mouth daily  -     ferrous sulfate 325 (65 Fe) mg tablet; Take 325 mg by mouth daily at bedtime  -     folic acid (FOLVITE) 966 mcg tablet; Take 400 mcg by mouth daily  -     potassium chloride (MICRO-K) 10 MEQ CR capsule; Take 10 mEq by mouth 2 (two) times a day  -     levothyroxine 25 mcg tablet; Take 25 mcg by mouth daily  -     mirtazapine (REMERON) 15 mg tablet; Take 15 mg by mouth daily at bedtime  -     rOPINIRole (REQUIP) 2 mg tablet; Take 2 mg by mouth daily  -     traZODone (DESYREL) 50 mg tablet; Take 1/2 tablet by mouth at bedtime  -     acetaminophen (TYLENOL) 325 mg tablet; Take by mouth Take 2 tablet every 6hrs PRN  -     zinc oxide (DESITIN) 13 % cream; Apply topically daily as needed  -     Magnesium Hydroxide (MILK OF MAGNESIA PO); Take by mouth ADMIN 30ML PO PRN  -     Ascorbic Acid (VITAMIN C GUMMIES) 125 MG CHEW; Chew TAKE TWO GUMMIES DAILY          Subjective:   CC  Pt here for f/u- L hand swelling  Review TSH BW & Ultrasound   Patient ID: Milderd Erik is a 80 y o  female      HPI  Patient is an 68-year-old  female who is been followed in the practice for her underlying osteoporosis currently on Prolia injections every 6 months  The patient was seen previously for presumptive hypothyroidism she was on low-dose replacement her levothyroxine was discontinued and a TSH was ordered prior to this visit which came back at 2 91 well within normal range so she is euthyroid at present on no medications  She had developed a lump on her left hand we did do an ultrasound revealing evidence of a complex fluid collection that was surrounding either ligaments or tendons it is not painful it has not grown will do no further testing at this time  The following portions of the patient's history were reviewed and updated as appropriate: allergies, current medications, past family history, past medical history, past social history, past surgical history and problem list     Review of Systems   Constitutional: Negative  HENT:        She has dentures sometimes her gums get sore   Eyes: Negative  Respiratory: Negative  Cardiovascular: Negative  Gastrointestinal: Negative  Endocrine: Negative  Genitourinary: Positive for urgency  Musculoskeletal: Negative  Skin: Negative  Allergic/Immunologic: Negative  Neurological: Negative  Hematological: Negative  Psychiatric/Behavioral: Negative  Objective:      /68 (BP Location: Left arm, Patient Position: Sitting, Cuff Size: Standard)   Pulse 71   Resp 16   Wt 40 6 kg (89 lb 9 6 oz)   SpO2 95%   BMI 16 93 kg/m²          Physical Exam   Constitutional: She is oriented to person, place, and time  She appears well-developed and well-nourished  HENT:   Head: Normocephalic and atraumatic  Right Ear: External ear normal    Left Ear: External ear normal    Nose: Nose normal    Mouth/Throat: Oropharynx is clear and moist    Neck: Normal range of motion  Neck supple  Cardiovascular: Normal rate, regular rhythm, normal heart sounds and intact distal pulses      Pulmonary/Chest: Effort normal and breath sounds normal  Abdominal: Soft  Bowel sounds are normal    Musculoskeletal: Normal range of motion  Neurological: She is alert and oriented to person, place, and time  She has normal reflexes  Skin: Skin is warm  Psychiatric: She has a normal mood and affect  Her behavior is normal  Judgment and thought content normal    Nursing note and vitals reviewed

## 2018-04-18 DIAGNOSIS — E03.9 HYPOTHYROIDISM, UNSPECIFIED TYPE: Primary | ICD-10-CM

## 2018-04-18 NOTE — PROGRESS NOTES
PC nurse noted that patient is still on thyroid meds  The nurse reported that OV note on 03/14 stated otherwise Per Dr Arnoldo Zhu verbal please orderThyroid BW for further review

## 2018-06-22 ENCOUNTER — OFFICE VISIT (OUTPATIENT)
Dept: GERIATRICS | Facility: CLINIC | Age: 83
End: 2018-06-22
Payer: MEDICARE

## 2018-06-22 VITALS
WEIGHT: 91.3 LBS | HEART RATE: 74 BPM | SYSTOLIC BLOOD PRESSURE: 112 MMHG | DIASTOLIC BLOOD PRESSURE: 68 MMHG | OXYGEN SATURATION: 95 % | BODY MASS INDEX: 16.8 KG/M2 | HEIGHT: 62 IN | RESPIRATION RATE: 16 BRPM

## 2018-06-22 DIAGNOSIS — R41.89 COGNITIVE IMPAIRMENT: ICD-10-CM

## 2018-06-22 DIAGNOSIS — M81.0 AGE-RELATED OSTEOPOROSIS WITHOUT CURRENT PATHOLOGICAL FRACTURE: Primary | ICD-10-CM

## 2018-06-22 DIAGNOSIS — N18.30 CKD (CHRONIC KIDNEY DISEASE), STAGE III (HCC): Primary | ICD-10-CM

## 2018-06-22 DIAGNOSIS — F32.1 CURRENT MODERATE EPISODE OF MAJOR DEPRESSIVE DISORDER WITHOUT PRIOR EPISODE (HCC): ICD-10-CM

## 2018-06-22 DIAGNOSIS — N18.30 CHRONIC KIDNEY DISEASE, STAGE III (MODERATE) (HCC): ICD-10-CM

## 2018-06-22 DIAGNOSIS — R26.2 AMBULATORY DYSFUNCTION: ICD-10-CM

## 2018-06-22 PROCEDURE — 99214 OFFICE O/P EST MOD 30 MIN: CPT | Performed by: INTERNAL MEDICINE

## 2018-06-22 NOTE — PATIENT INSTRUCTIONS
1 -I will follow the patient in 4 weeks time and reassess the effectiveness of discontinuing trazodone

## 2018-06-22 NOTE — ASSESSMENT & PLAN NOTE
Patient appears to be in good spirits her medications were reviewed we will leave her on monotherapy for her depression

## 2018-06-22 NOTE — PROGRESS NOTES
Assessment/Plan:    Chronic kidney disease, stage III (moderate)  Will check CMP prior to next visit  Ambulatory dysfunction  Will continue with physical therapy on a p r n  basis    Cognitive impairment  Patient was able to carry out a conversation without any particular problems  Depression  Patient appears to be in good spirits her medications were reviewed we will leave her on monotherapy for her depression  Osteoporosis  She currently is receiving her injections i e  Prolia       Diagnoses and all orders for this visit:    Age-related osteoporosis without current pathological fracture    Chronic kidney disease, stage III (moderate)    Ambulatory dysfunction    Cognitive impairment    Current moderate episode of major depressive disorder without prior episode ([facility] [address] )          Subjective:   CC  Pt here for 3mth routine- Review chronic conditions     Patient ID: Wilber Yap is a 80 y o  female  Patient is an 68-year-old  female who currently is residing at [facility]  She has managed to gain 2 lb in the last few months she currently is taking mirtazapine 15 mg a day and I am hoping that that will help stimulate her appetite  On reviewing her medications I noticed that she is taking both mirtazapine and trazodone both are antidepressants, both induce sleep  She only needs to take 1 so I am going to stop her trazodone altogether  Her ambulation is stable she uses a 4 wheeled walker  She seems to have adjusted to it her new settings  She denies any chest pain she denies any shortness of breath at present  She showed me some discoloration of her legs which are secondary to venous stasis  The following portions of the patient's history were reviewed and updated as appropriate: allergies, current medications, past family history, past medical history, past social history, past surgical history and problem list     Review of Systems   Constitutional: Negative  HENT: Negative  Eyes: Negative  Respiratory: Negative  Cardiovascular: Negative  Gastrointestinal: Negative  Endocrine: Negative  Genitourinary: Negative  Musculoskeletal: Negative  Skin: Negative  Allergic/Immunologic: Negative  Neurological: Negative  Hematological: Negative  Psychiatric/Behavioral: Negative  Objective:      /68 (BP Location: Right arm, Patient Position: Sitting, Cuff Size: Standard)   Pulse 74   Resp 16   Ht 5' 2" (1 575 m)   Wt 41 4 kg (91 lb 4 8 oz)   SpO2 95%   BMI 16 70 kg/m²          Physical Exam   Constitutional: She is oriented to person, place, and time  She appears well-developed and well-nourished  HENT:   Head: Normocephalic and atraumatic  Right Ear: External ear normal    Left Ear: External ear normal    Nose: Nose normal    Mouth/Throat: Oropharynx is clear and moist    Eyes: Conjunctivae and EOM are normal  Pupils are equal, round, and reactive to light  Neck: Normal range of motion  Neck supple  Cardiovascular: Normal rate, regular rhythm, normal heart sounds and intact distal pulses  Pulmonary/Chest: Effort normal and breath sounds normal    Abdominal: Soft  Bowel sounds are normal    Musculoskeletal: Normal range of motion  Neurological: She is alert and oriented to person, place, and time  She has normal reflexes  Skin: Skin is warm and dry  Nursing note and vitals reviewed

## 2018-07-23 ENCOUNTER — OFFICE VISIT (OUTPATIENT)
Dept: GERIATRICS | Facility: CLINIC | Age: 83
End: 2018-07-23
Payer: MEDICARE

## 2018-07-23 VITALS
HEIGHT: 62 IN | WEIGHT: 90.1 LBS | RESPIRATION RATE: 16 BRPM | SYSTOLIC BLOOD PRESSURE: 112 MMHG | DIASTOLIC BLOOD PRESSURE: 70 MMHG | HEART RATE: 61 BPM | BODY MASS INDEX: 16.58 KG/M2 | OXYGEN SATURATION: 97 %

## 2018-07-23 DIAGNOSIS — F32.1 CURRENT MODERATE EPISODE OF MAJOR DEPRESSIVE DISORDER WITHOUT PRIOR EPISODE (HCC): ICD-10-CM

## 2018-07-23 DIAGNOSIS — N18.30 CHRONIC KIDNEY DISEASE, STAGE III (MODERATE) (HCC): Primary | ICD-10-CM

## 2018-07-23 DIAGNOSIS — G25.81 RLS (RESTLESS LEGS SYNDROME): ICD-10-CM

## 2018-07-23 DIAGNOSIS — R41.3 SHORT-TERM MEMORY LOSS: ICD-10-CM

## 2018-07-23 PROCEDURE — 99214 OFFICE O/P EST MOD 30 MIN: CPT | Performed by: INTERNAL MEDICINE

## 2018-07-23 NOTE — PATIENT INSTRUCTIONS
1 -patient will return in 3 months time will check a CMP to monitor her kidney function at that time

## 2018-07-23 NOTE — PROGRESS NOTES
Assessment/Plan:    Short-term memory loss  This appears to be stable at present no marked deterioration    RLS (restless legs syndrome)  Patient still has issues with restless leg before she falls asleep however once she sleeps she does not wake up because of the restless leg  Depression  Doing well with Remeron only  Diagnoses and all orders for this visit:    Chronic kidney disease, stage III (moderate)    Short-term memory loss    RLS (restless legs syndrome)    Current moderate episode of major depressive disorder without prior episode ([address] )    Other orders  -     benzocaine (ANBESOL) 10 %; by Mucosal route Apply as needed for mouth pain  Self Administer          Subjective:   CC  Pt here for 4wk f/u- CKD [id number]  Review CMP BW done 07/17/18       Patient ID: Clare Brito is a 80 y o  female  Patient is an 60-year-old  female currently she is followed for depression I had stopped her trazodone and she appears to be responding well to the decreased dosage in the antidepressant she currently is only taking mirtazapine 15 mg at bedtime for this particular problem  The patient also has history of restless leg syndrome, cognitive impairment, she was on supplements for her weight loss and that has remained stable between 89 and 91 lb  Patient's current creatinine is 1 44 with a GFR of 33  The following portions of the patient's history were reviewed and updated as appropriate: allergies, current medications, past family history, past medical history, past social history, past surgical history and problem list     Review of Systems   Constitutional: Negative  HENT: Negative  Eyes: Negative  Respiratory: Negative  Cardiovascular: Negative  Gastrointestinal: Negative  Endocrine: Negative  Genitourinary: Negative  Musculoskeletal: Negative  Skin: Negative  Allergic/Immunologic: Negative  Neurological: Negative  Hematological: Negative  Psychiatric/Behavioral: Negative  Objective:      /70 (BP Location: Right arm, Patient Position: Sitting, Cuff Size: Standard)   Pulse 61   Resp 16   Ht 5' 2" (1 575 m)   Wt 40 9 kg (90 lb 1 6 oz)   SpO2 97%   BMI 16 48 kg/m²          Physical Exam   Constitutional: She is oriented to person, place, and time  She appears well-developed and well-nourished  HENT:   Head: Normocephalic and atraumatic  Left Ear: External ear normal    Eyes: Conjunctivae and EOM are normal  Pupils are equal, round, and reactive to light  Neck: Normal range of motion  Neck supple  Cardiovascular: Normal rate, regular rhythm, normal heart sounds and intact distal pulses  Pulmonary/Chest: Effort normal and breath sounds normal    Abdominal: Soft  Bowel sounds are normal    Musculoskeletal: Normal range of motion  Neurological: She is alert and oriented to person, place, and time  She has normal reflexes  Skin: Skin is warm  Psychiatric: She has a normal mood and affect  Her behavior is normal  Judgment and thought content normal    Nursing note and vitals reviewed

## 2018-07-23 NOTE — ASSESSMENT & PLAN NOTE
Patient still has issues with restless leg before she falls asleep however once she sleeps she does not wake up because of the restless leg

## 2018-08-14 ENCOUNTER — CLINICAL SUPPORT (OUTPATIENT)
Dept: GERIATRICS | Facility: CLINIC | Age: 83
End: 2018-08-14
Payer: MEDICARE

## 2018-08-14 DIAGNOSIS — M81.0 AGE RELATED OSTEOPOROSIS, UNSPECIFIED PATHOLOGICAL FRACTURE PRESENCE: Primary | ICD-10-CM

## 2018-08-14 DIAGNOSIS — M81.0 AGE-RELATED OSTEOPOROSIS WITHOUT CURRENT PATHOLOGICAL FRACTURE: ICD-10-CM

## 2018-08-14 PROCEDURE — 96372 THER/PROPH/DIAG INJ SC/IM: CPT | Performed by: NURSE PRACTITIONER

## 2018-08-14 NOTE — PROGRESS NOTES
Pt came to have Prolia meds administered  60mg was given Subcutaneous in Right upper arm  Pt tolerated meds ok and will return in 6mths for her next injection   [address] [id number] CQI#[id number] Exp 09/20

## 2018-10-08 ENCOUNTER — TELEPHONE (OUTPATIENT)
Dept: GERIATRICS | Facility: CLINIC | Age: 83
End: 2018-10-08

## 2018-10-09 ENCOUNTER — OFFICE VISIT (OUTPATIENT)
Dept: GERIATRICS | Facility: CLINIC | Age: 83
End: 2018-10-09
Payer: MEDICARE

## 2018-10-09 VITALS
SYSTOLIC BLOOD PRESSURE: 114 MMHG | TEMPERATURE: 97.8 F | HEART RATE: 66 BPM | HEIGHT: 62 IN | BODY MASS INDEX: 17.13 KG/M2 | DIASTOLIC BLOOD PRESSURE: 60 MMHG | WEIGHT: 93.1 LBS | RESPIRATION RATE: 16 BRPM

## 2018-10-09 DIAGNOSIS — T18.9XXA FOREIGN BODY IN DIGESTIVE TRACT, INITIAL ENCOUNTER: Primary | ICD-10-CM

## 2018-10-09 PROCEDURE — 99212 OFFICE O/P EST SF 10 MIN: CPT | Performed by: NURSE PRACTITIONER

## 2018-10-09 NOTE — PROGRESS NOTES
Assessment/Plan:    Foreign body in digestive tract  · Unclear if patient actually swallowed object, but no throat pain or abdominal pain  · Please notify office if constipation, nausea, abd pain  · Recommend dental appointment to assess need for new plate  · Plan discussed w/Dr John Link       Diagnoses and all orders for this visit:    Foreign body in digestive tract, initial encounter        Subjective:      Patient ID: Dat Carmona is a 80 y o  female  Patient is here because she apparently broke the pink portion of her denture plate off and possibly swallowed  It  She states this happened several weeks ago: She was in the dining room and had difficulty swallowing  She finally was able to pass object down esophagus without pain   She is not sure if she passed piece from stools  She denies abd pain  No constipation  No nausea  Reviewed allergies, meds, pb list     Review of Systems   Constitutional: Negative  Gastrointestinal: Negative  Objective:      /60 (BP Location: Right arm, Patient Position: Sitting, Cuff Size: Standard)   Pulse 66   Temp 97 8 °F (36 6 °C) (Tympanic)   Resp 16   Ht 5' 2" (1 575 m)   Wt 42 2 kg (93 lb 1 6 oz)   BMI 17 03 kg/m²          Physical Exam   Constitutional: She appears well-developed and well-nourished  thin   Abdominal: Soft  Bowel sounds are normal  She exhibits no distension and no mass  There is no tenderness  There is no rebound and no guarding  Nursing note and vitals reviewed

## 2018-10-09 NOTE — ASSESSMENT & PLAN NOTE
· Unclear if patient actually swallowed object, but no throat pain or abdominal pain  · Please notify office if constipation, nausea, abd pain  · Recommend dental appointment to assess need for new plate    · Plan discussed w/Dr Deisy Rocha

## 2018-10-12 ENCOUNTER — OFFICE VISIT (OUTPATIENT)
Dept: GERIATRICS | Facility: CLINIC | Age: 83
End: 2018-10-12
Payer: MEDICARE

## 2018-10-12 VITALS
HEIGHT: 62 IN | WEIGHT: 90.3 LBS | BODY MASS INDEX: 16.62 KG/M2 | DIASTOLIC BLOOD PRESSURE: 60 MMHG | HEART RATE: 65 BPM | SYSTOLIC BLOOD PRESSURE: 114 MMHG | TEMPERATURE: 98.4 F | RESPIRATION RATE: 16 BRPM

## 2018-10-12 DIAGNOSIS — L98.411 SKIN ULCER OF BUTTOCK, LIMITED TO BREAKDOWN OF SKIN (HCC): Primary | ICD-10-CM

## 2018-10-12 PROBLEM — L98.419 SKIN ULCER OF BUTTOCK (HCC): Status: ACTIVE | Noted: 2018-10-12

## 2018-10-12 PROCEDURE — 99213 OFFICE O/P EST LOW 20 MIN: CPT | Performed by: INTERNAL MEDICINE

## 2018-10-12 NOTE — PROGRESS NOTES
Assessment/Plan:    Skin ulcer of buttock (HCC)  1 5 cm open skin ulcer located on the L upper buttocks  Patient will apply Silvadene cream 1% on the ulcer daily and Alevin will be used on the affected area as well  Diagnoses and all orders for this visit:    Skin ulcer of buttock, limited to breakdown of skin ([facility] [address] )          Subjective:      Patient ID: Shelby Rivas is a 80 y o  female  Patient is an 79 y/o female who presents to the office with complaints of a "scab" on her L buttocks  She states she has complete urinary incontinence and has to always wear diapers because she frequently soils herself  She states she believes that the scab is from irritation from wearing the soiled diapers  She admits that it is tender when pushing on the spot  She has been putting on diaper rash cream to try to treat it  She thinks it may be helping  She also states she may have recently swallowed a piece of her dentures because a piece broke off  Her dentures are loose and do not fit well, therefore she does have some difficulty chewing sometimes  The following portions of the patient's history were reviewed and updated as appropriate: allergies, current medications, past family history, past medical history, past social history, past surgical history and problem list     Review of Systems   Constitutional: Negative for appetite change, chills, diaphoresis, fatigue, fever and unexpected weight change  HENT: Positive for voice change (c/o voice scratchy everyday in the afternoon)  Negative for ear discharge, ear pain, hearing loss, sore throat, tinnitus and trouble swallowing  Eyes: Negative for pain, redness, itching and visual disturbance  Respiratory: Negative for cough, shortness of breath and wheezing  Cardiovascular: Negative for chest pain, palpitations and leg swelling  Gastrointestinal: Negative for abdominal pain, blood in stool, constipation, diarrhea, nausea and vomiting  Genitourinary: Positive for frequency and urgency  Negative for difficulty urinating, dysuria and hematuria  Incontinent  Musculoskeletal: Negative for arthralgias and myalgias  Skin: Positive for wound  Neurological: Negative for dizziness, syncope, light-headedness and headaches  Objective:      /60 (BP Location: Right arm, Patient Position: Sitting, Cuff Size: Standard)   Pulse 65   Temp 98 4 °F (36 9 °C)   Resp 16   Ht 5' 2" (1 575 m)   Wt 41 kg (90 lb 4 8 oz)   BMI 16 52 kg/m²          Physical Exam   Constitutional: She is oriented to person, place, and time  She appears well-developed  No distress  Cachectic  HENT:   Head: Normocephalic and atraumatic  Right Ear: External ear normal    Left Ear: External ear normal    Mouth/Throat: Oropharynx is clear and moist  No oropharyngeal exudate  Dentures upper and lower  Eyes: Pupils are equal, round, and reactive to light  Conjunctivae and EOM are normal  Right eye exhibits no discharge  Left eye exhibits no discharge  No scleral icterus  Neck: Normal range of motion  Neck supple  No JVD present  Cardiovascular: Normal rate, regular rhythm and intact distal pulses  Exam reveals no gallop and no friction rub  No murmur heard  Pulmonary/Chest: Effort normal and breath sounds normal  No respiratory distress  She has no wheezes  She has no rales  She exhibits no tenderness  Abdominal: Soft  Bowel sounds are normal  She exhibits no distension and no mass  There is no tenderness  Musculoskeletal: Normal range of motion  She exhibits no edema, tenderness or deformity  Neurological: She is alert and oriented to person, place, and time  Skin: Skin is warm and dry  No rash noted  She is not diaphoretic  No erythema  Psychiatric: She has a normal mood and affect  Her behavior is normal  Judgment and thought content normal    Nursing note and vitals reviewed

## 2018-10-12 NOTE — PATIENT INSTRUCTIONS
1  Patient will apply Silvadene 1% cream to the ulcer on her left upper buttocks daily until it clears  She will also use Alevin on the affected area  Patient was encouraged to keep the area clean and dry  Ulcer will continue to be monitored by staff  2  Patient will return in 2 weeks for a follow up

## 2018-10-12 NOTE — ASSESSMENT & PLAN NOTE
1 5 cm open skin ulcer located on the L upper buttocks  Patient will apply Silvadene cream 1% on the ulcer daily and Alevin will be used on the affected area as well

## 2018-10-29 ENCOUNTER — OFFICE VISIT (OUTPATIENT)
Dept: GERIATRICS | Facility: CLINIC | Age: 83
End: 2018-10-29
Payer: MEDICARE

## 2018-10-29 VITALS
DIASTOLIC BLOOD PRESSURE: 60 MMHG | SYSTOLIC BLOOD PRESSURE: 112 MMHG | WEIGHT: 88.6 LBS | RESPIRATION RATE: 16 BRPM | HEART RATE: 70 BPM | OXYGEN SATURATION: 99 % | HEIGHT: 62 IN | BODY MASS INDEX: 16.3 KG/M2

## 2018-10-29 DIAGNOSIS — R63.4 WEIGHT LOSS: ICD-10-CM

## 2018-10-29 DIAGNOSIS — H61.22 IMPACTED CERUMEN OF LEFT EAR: ICD-10-CM

## 2018-10-29 DIAGNOSIS — G25.81 RLS (RESTLESS LEGS SYNDROME): Primary | ICD-10-CM

## 2018-10-29 DIAGNOSIS — R41.3 SHORT-TERM MEMORY LOSS: ICD-10-CM

## 2018-10-29 DIAGNOSIS — L98.411 SKIN ULCER OF BUTTOCK, LIMITED TO BREAKDOWN OF SKIN (HCC): ICD-10-CM

## 2018-10-29 DIAGNOSIS — R49.9 HOARSENESS OR CHANGING VOICE: ICD-10-CM

## 2018-10-29 PROCEDURE — 99214 OFFICE O/P EST MOD 30 MIN: CPT | Performed by: INTERNAL MEDICINE

## 2018-10-29 PROCEDURE — 69210 REMOVE IMPACTED EAR WAX UNI: CPT | Performed by: INTERNAL MEDICINE

## 2018-10-29 NOTE — ASSESSMENT & PLAN NOTE
Appears to be healing well  Patient was encouraged to keep from sitting for long periods of time  She was also encouraged to continue applying her prescribed cream to the area  We will see her back in 4 weeks

## 2018-10-29 NOTE — ASSESSMENT & PLAN NOTE
Patient states she has been experiencing weight loss for the past few weeks  Review of her vitals reveals a change in weight from 93 pounds on 10/09/18 to 88 pounds today  She states she has not changed her appetite  Her TSH was 3 04 in May 2018  She does have an elevated alk phos  The ropinirole is a potential cause of her weight loss, however she does not wish to stop taking it because it provides her relief from her restless leg syndrome  She was encouraged to continue taking her Ensure and to continue eating regularly her 3 meals a day  Additionally, she will be referred to ENT to determine if her hoarseness is a potential cause of her weight loss  She will return to the office in 4 weeks to determine if her weight loss persists

## 2018-10-29 NOTE — PATIENT INSTRUCTIONS
1  Patient will be referred to ENT for her complaints of hoarseness and change in voice  2  Patient will be seen back in the office in 4 weeks to evaluate if her weight loss continues

## 2018-10-29 NOTE — ASSESSMENT & PLAN NOTE
Patient had cerumen impaction of her L EAC  A lighted curette was utilized to remove the cerumen in office  Procedure was successful and patient tolerated it well  No complications

## 2018-10-29 NOTE — ASSESSMENT & PLAN NOTE
She states she has been sleeping extremely well since being on the Requip  She does state she still feels like her legs are restless when she is sitting in her bed at night, but when she takes the Ropinirole, her symptoms are alleviated  The possibility of d/c her ropinirole was discussed due to the patient's recent weight loss  However, she does not want to stop because she would experience significant cramping in her legs if she didn't have it

## 2018-10-29 NOTE — PROGRESS NOTES
Ear cerumen removal  Date/Time: 10/29/2018 12:17 PM  Performed by: Norman Mijares by: Maggi Raygoza     Patient location:  Clinic  Indications / Diagnosis:  Cerumen impaction - L ear   Consent:     Consent obtained:  Verbal    Consent given by:  Patient    Risks discussed:  Bleeding, dizziness, incomplete removal, infection, pain and TM perforation    Alternatives discussed:  No treatment and observation  Universal protocol:     Patient identity confirmed:  Verbally with patient  Procedure details:     Local anesthetic:  None    Location:  L ear    Procedure type: curette    Post-procedure details:     Complication:  None    Hearing quality:  Normal    Patient tolerance of procedure: Tolerated well, no immediate complications  Comments:      L EAC was noted to be impacted with cerumen  A lighted ear curette was utilized to remove the cerumen from the canal  Procedure was successful and the canal was cleared  TM was able to be visualized  No complications

## 2018-10-29 NOTE — ASSESSMENT & PLAN NOTE
Patient continues to state she has trouble with her short term memory, but it has not worsened lately

## 2018-10-29 NOTE — ASSESSMENT & PLAN NOTE
Patient complains of chronic changes in her voice which happens around 12pm every day  She says that she feels like she starts to lose her voice and it becomes hoarse  She has noticed this for a month now  She does admit to a Hx of GERD  She states she used to smoke regularly for at least 30 years  She will be referred to ENT for further evaluation given this hoarseness, her smoking history, and her recent weight loss

## 2018-10-29 NOTE — PROGRESS NOTES
Assessment/Plan:    RLS (restless legs syndrome)  She states she has been sleeping extremely well since being on the Requip  She does state she still feels like her legs are restless when she is sitting in her bed at night, but when she takes the Ropinirole, her symptoms are alleviated  The possibility of d/c her ropinirole was discussed due to the patient's recent weight loss  However, she does not want to stop because she would experience significant cramping in her legs if she didn't have it  Hoarseness or changing voice  Patient complains of chronic changes in her voice which happens around 12pm every day  She says that she feels like she starts to lose her voice and it becomes hoarse  She has noticed this for a month now  She does admit to a Hx of GERD  She states she used to smoke regularly for at least 30 years  She will be referred to ENT for further evaluation given this hoarseness, her smoking history, and her recent weight loss  Skin ulcer of buttock ([address] )  Appears to be healing well  Patient was encouraged to keep from sitting for long periods of time  She was also encouraged to continue applying her prescribed cream to the area  We will see her back in 4 weeks  Short-term memory loss  Patient continues to state she has trouble with her short term memory, but it has not worsened lately  Impacted cerumen of left ear  Patient had cerumen impaction of her L EAC  A lighted curette was utilized to remove the cerumen in office  Procedure was successful and patient tolerated it well  No complications  Diagnoses and all orders for this visit:    RLS (restless legs syndrome)    Short-term memory loss    Hoarseness or changing voice  -     Ambulatory Referral to Otolaryngology; Future    Skin ulcer of buttock, limited to breakdown of skin ([address] )    Impacted cerumen of left ear  -     Ear cerumen removal          Subjective:      Patient ID: Edwin Andino is a 80 y o  female      Patient is a 79 y/o female with a history GERD, hypothyroidism, anemia, restless leg syndrome, and short term memory difficulties of who presents to the office for a routine follow up  She states that she has been sleeping very well since she has been on the Remeron  She also admits that the Requip helps her feeling of restless legs  If she does not take it, she says that she experiences leg cramps at night  She states she believes she is losing weight  Upon examination of her vitals, she has lost 5 lbs - going from 93lbs to 88lbs  She also states she is thirsty all the time  She also states that every day around noon she will begin to lose her voice and become very hoarse  This has been happening for about 1 month now  She has a Hx of GERD  She also admits to a smoking history of at least 30 years  She says she will wake up in the mornings with feelings of itchy nose  She is wondering why this happens  Her recent lab work done on 10/15/18 demonstrates a GFR of 33 ml/min  She also had a mildly elevated sodium of 146  Her TSH was 3 04 in May 2018  The following portions of the patient's history were reviewed and updated as appropriate: allergies, current medications, past family history, past medical history, past social history, past surgical history and problem list     Review of Systems   Constitutional: Positive for unexpected weight change (believes she has lost weight)  Negative for appetite change, chills, diaphoresis, fatigue and fever  HENT: Positive for voice change (she states that every day around noon she has difficulty speaking and feels like she starts losing her voice)  Negative for ear discharge, ear pain, hearing loss, sore throat, tinnitus and trouble swallowing  Eyes: Negative for pain, discharge, redness, itching and visual disturbance  Respiratory: Negative for cough, chest tightness, shortness of breath and wheezing      Cardiovascular: Negative for chest pain, palpitations and leg swelling  Gastrointestinal: Positive for constipation (uses stool softeners)  Negative for abdominal pain, blood in stool, diarrhea, nausea and vomiting  Endocrine: Positive for polydipsia  Genitourinary: Positive for frequency (wears a diaper) and urgency  Negative for difficulty urinating, dysuria and hematuria  Musculoskeletal: Negative for arthralgias and myalgias  Skin: Negative for pallor and rash  Neurological: Negative for dizziness, syncope, light-headedness and headaches  Objective:      /60 (BP Location: Right arm, Patient Position: Sitting, Cuff Size: Standard)   Pulse 70   Resp 16   Ht 5' 2" (1 575 m)   Wt 40 2 kg (88 lb 9 6 oz)   SpO2 99%   BMI 16 21 kg/m²          Physical Exam   Constitutional: She is oriented to person, place, and time  She appears well-developed  No distress  Cachectic    HENT:   Head: Normocephalic and atraumatic  Right Ear: External ear normal    Mouth/Throat: Oropharynx is clear and moist  No oropharyngeal exudate  Cerumen impaction of L EAC   Eyes: Pupils are equal, round, and reactive to light  Conjunctivae and EOM are normal  Right eye exhibits no discharge  Left eye exhibits no discharge  No scleral icterus  Neck: Normal range of motion  Neck supple  No JVD present  Cardiovascular: Normal rate, regular rhythm, normal heart sounds and intact distal pulses  Exam reveals no gallop and no friction rub  No murmur heard  Distant heart sounds   Pulmonary/Chest: Effort normal and breath sounds normal  No respiratory distress  She has no wheezes  She has no rales  She exhibits no tenderness  Abdominal: Soft  Bowel sounds are normal  She exhibits no distension and no mass  There is no tenderness  Musculoskeletal: Normal range of motion  She exhibits no edema, tenderness or deformity  Neurological: She is alert and oriented to person, place, and time  Skin: Skin is warm and dry  No rash noted  She is not diaphoretic   No erythema  Psychiatric: She has a normal mood and affect  Her behavior is normal  Judgment and thought content normal    Nursing note and vitals reviewed

## 2018-11-26 ENCOUNTER — OFFICE VISIT (OUTPATIENT)
Dept: GERIATRICS | Facility: CLINIC | Age: 83
End: 2018-11-26
Payer: MEDICARE

## 2018-11-26 VITALS
WEIGHT: 91.13 LBS | SYSTOLIC BLOOD PRESSURE: 120 MMHG | HEART RATE: 68 BPM | RESPIRATION RATE: 16 BRPM | DIASTOLIC BLOOD PRESSURE: 62 MMHG | BODY MASS INDEX: 16.67 KG/M2 | TEMPERATURE: 98.3 F

## 2018-11-26 DIAGNOSIS — E02 SUBCLINICAL IODINE-DEFICIENCY HYPOTHYROIDISM: ICD-10-CM

## 2018-11-26 DIAGNOSIS — N18.30 CHRONIC KIDNEY DISEASE, STAGE III (MODERATE) (HCC): ICD-10-CM

## 2018-11-26 DIAGNOSIS — R20.0 NUMBNESS OF LEFT HAND: ICD-10-CM

## 2018-11-26 DIAGNOSIS — G25.81 RLS (RESTLESS LEGS SYNDROME): Primary | ICD-10-CM

## 2018-11-26 PROCEDURE — 99214 OFFICE O/P EST MOD 30 MIN: CPT | Performed by: INTERNAL MEDICINE

## 2018-11-26 NOTE — PATIENT INSTRUCTIONS
1 -patient will be referred to Neurology for the left hand numbness  If there is a delay in getting to see a neurologist will just order EMG studies for that arm  she had an x-ray of she has a good pulse

## 2018-11-26 NOTE — ASSESSMENT & PLAN NOTE
Patient has been having increased numbness in her left hand specially when she uses the Rollator walker  She has ulnar deviation of both hands

## 2018-11-26 NOTE — PROGRESS NOTES
Assessment/Plan:    Hypothyroidism  Patient has history of hypothyroidism however she is not on any thyroid replacement I will need to check a TSH to make sure that her levels within range  RLS (restless legs syndrome)  Ropinirole seems to be working well for her  Her restless leg has improved  Chronic kidney disease, stage III (moderate)  Patient's last GFR was 33 placing her at chronic renal failure stage 3  Cognitive impairment  Patient gave superb answers today and all of her answers made a lot of sense  Numbness of left hand  Patient has been having increased numbness in her left hand specially when she uses the Rollator walker  She has ulnar deviation of both hands  Diagnoses and all orders for this visit:    RLS (restless legs syndrome)    Chronic kidney disease, stage III (moderate) ([facility])    Numbness of left hand          Subjective:      Patient ID: Yaneth Dinh is a 80 y o  female  Patient is an 19-year-old  female who returns to the office today she has history of chronic renal failure stage 3, restless leg syndrome, has the diagnosis of hypothyroidism but currently is not taking any Synthroid so I will need to get a TSH and if the TSH is within normal limits I will erase the diagnosis of hypothyroidism  They also note that her left hand gets numb when she uses her walker she get a good bounding pulses in the left hand  I suspect the problem is nerve related and will refer her to the neurologist for EMG studies of that hand  The following portions of the patient's history were reviewed and updated as appropriate: allergies, current medications, past family history, past medical history, past social history, past surgical history and problem list     Review of Systems   Constitutional: Negative  HENT: Negative  Eyes: Negative  Respiratory: Negative  Cardiovascular: Negative  Gastrointestinal: Negative  Endocrine: Negative      Genitourinary: Negative  Musculoskeletal: Negative  Skin: Negative  Allergic/Immunologic: Negative  Neurological: Negative  Hematological: Negative  Psychiatric/Behavioral: Negative  Objective:      /62 (BP Location: Left arm, Patient Position: Sitting, Cuff Size: Standard)   Pulse 68   Temp 98 3 °F (36 8 °C) (Tympanic)   Resp 16   Wt 41 3 kg (91 lb 2 oz) Comment: with shoes  BMI 16 67 kg/m²          Physical Exam   Constitutional: She is oriented to person, place, and time  She appears well-developed and well-nourished  HENT:   Head: Normocephalic and atraumatic  Right Ear: External ear normal    Left Ear: External ear normal    Mouth/Throat: Oropharynx is clear and moist    Eyes: Pupils are equal, round, and reactive to light  Conjunctivae are normal    Neck: Normal range of motion  Neck supple  Cardiovascular: Normal rate, normal heart sounds and intact distal pulses  Pulmonary/Chest: Effort normal and breath sounds normal    Abdominal: Soft  Bowel sounds are normal    Musculoskeletal: Normal range of motion  Neurological: She is alert and oriented to person, place, and time  She has normal reflexes  Skin: Skin is warm and dry  Psychiatric: She has a normal mood and affect  Her behavior is normal  Judgment and thought content normal    Nursing note and vitals reviewed

## 2018-11-26 NOTE — ASSESSMENT & PLAN NOTE
Patient has history of hypothyroidism however she is not on any thyroid replacement I will need to check a TSH to make sure that her levels within range

## 2018-12-18 ENCOUNTER — OFFICE VISIT (OUTPATIENT)
Dept: GERIATRICS | Facility: CLINIC | Age: 83
End: 2018-12-18
Payer: MEDICARE

## 2018-12-18 VITALS
WEIGHT: 93.2 LBS | SYSTOLIC BLOOD PRESSURE: 110 MMHG | RESPIRATION RATE: 16 BRPM | DIASTOLIC BLOOD PRESSURE: 70 MMHG | HEART RATE: 68 BPM | TEMPERATURE: 98.2 F | OXYGEN SATURATION: 95 % | BODY MASS INDEX: 17.05 KG/M2

## 2018-12-18 DIAGNOSIS — L30.8 DERMATITIS ASSOCIATED WITH MOISTURE: Primary | ICD-10-CM

## 2018-12-18 PROCEDURE — 99213 OFFICE O/P EST LOW 20 MIN: CPT | Performed by: NURSE PRACTITIONER

## 2018-12-18 NOTE — PATIENT INSTRUCTIONS
· D/C current dressing change orders  · Start solistie and allevyn  Change 5 days  Measure and note s/s infection  Notify office if worsening sx    · See patient in 2 weeks unless worsening symptoms  · Consult PT to find appropriate cushion for patient and look at walker breaks (did not work today)

## 2018-12-18 NOTE — ASSESSMENT & PLAN NOTE
· Dc silvadene/dry dressing  · Solisite and allevyn  Change q5 days and prn  · Monitor for concerns with infection (reddness, warmth, drainage, increased pain, fever)  Notify if concerns  · PT consult for appropriate cushion to assist in pressure off loading  · Encourage patient to stay off area when she can and reposition frequently  · Help patient stay dry and encourage time voidings to decrease incontinence

## 2018-12-18 NOTE — PROGRESS NOTES
Assessment/Plan:    Dermatitis associated with moisture  · Dc silvadene/dry dressing  · Solisite and allevyn  Change q5 days and prn  · Monitor for concerns with infection (reddness, warmth, drainage, increased pain, fever)  Notify if concerns  · PT consult for appropriate cushion to assist in pressure off loading  · Encourage patient to stay off area when she can and reposition frequently  · Help patient stay dry and encourage time voidings to decrease incontinence  Diagnoses and all orders for this visit:    Dermatitis associated with moisture    Other orders  -     silver sulfadiazine (SILVADENE) 1 % cream; Apply topically daily Apply sparingly to left buttocks with sterile dressing        Subjective:      Patient ID: Andrea Gary is a 80 y o  female  Patient here for left buttock wound that is not healing with silvadene/daily dressing change  Pain in buttocks is bothering patient  She sits a lot on area without any specialized cushion and is looking for a donut cushion  She denies fever, chills, drainage from wound  The following portions of the patient's history were reviewed and updated as appropriate: past family history, past medical history, past social history and past surgical history  Review of Systems   Constitutional: Negative  HENT: Negative  Skin: Positive for wound  Objective:      /70 (BP Location: Right arm, Patient Position: Sitting, Cuff Size: Standard)   Pulse 68   Temp 98 2 °F (36 8 °C) (Tympanic)   Resp 16   Wt 42 3 kg (93 lb 3 2 oz)   SpO2 95%   BMI 17 05 kg/m²          Physical Exam   Constitutional: She is oriented to person, place, and time  She appears well-developed and well-nourished  No distress  HENT:   Head: Normocephalic  Neurological: She is alert and oriented to person, place, and time  Forgetful  Engages in convo actively  Stands with 2 assist, amb with walker   Skin: Skin is warm and dry  No rash noted   She is not diaphoretic  No erythema  No pallor     Two wounds on left buttock cheek- appearence consistent with MASD and tape wound  No erythema, edema, warmth drainage  On top wound is 1 2 x 0 8- close with scabbed center  On bottom wound is 1 2 x 0 5 - top layer skin removed- no drainage or weeping

## 2019-01-02 ENCOUNTER — OFFICE VISIT (OUTPATIENT)
Dept: GERIATRICS | Facility: CLINIC | Age: 84
End: 2019-01-02
Payer: MEDICARE

## 2019-01-02 VITALS
WEIGHT: 91.2 LBS | RESPIRATION RATE: 16 BRPM | TEMPERATURE: 97.6 F | HEART RATE: 68 BPM | SYSTOLIC BLOOD PRESSURE: 100 MMHG | BODY MASS INDEX: 16.68 KG/M2 | DIASTOLIC BLOOD PRESSURE: 66 MMHG

## 2019-01-02 DIAGNOSIS — L89.152 PRESSURE INJURY OF SACRAL REGION, STAGE 2 (HCC): Primary | ICD-10-CM

## 2019-01-02 DIAGNOSIS — G56.03 BILATERAL CARPAL TUNNEL SYNDROME: ICD-10-CM

## 2019-01-02 PROBLEM — L89.159 PRESSURE ULCER, SACRUM: Status: ACTIVE | Noted: 2019-01-02

## 2019-01-02 PROBLEM — G56.00 CARPAL TUNNEL SYNDROME: Status: ACTIVE | Noted: 2019-01-02

## 2019-01-02 PROCEDURE — 99214 OFFICE O/P EST MOD 30 MIN: CPT | Performed by: INTERNAL MEDICINE

## 2019-01-02 NOTE — PROGRESS NOTES
Assessment/Plan:    Pressure ulcer, sacrum  Stage 1 and 2 pressure ulcer on sacrum  Carpal tunnel syndrome  Has appointment with neurologist for numbness and tingling in her hands  Diagnoses and all orders for this visit:    Pressure injury of sacral region, stage 2    Bilateral carpal tunnel syndrome          Subjective:      Patient ID: Steve Rowe is a 80 y o  female  A 80year old  female presents today with skin breakdown on the buttocks  This is an ongoing issue that is being followed actively by nursing staff  The following portions of the patient's history were reviewed and updated as appropriate: allergies, current medications, past family history, past medical history, past social history, past surgical history and problem list     Review of Systems   Constitutional: Negative  Negative for activity change and appetite change  HENT: Negative  Respiratory: Negative  Cardiovascular: Negative  Gastrointestinal: Negative for constipation, diarrhea and nausea  Genitourinary: Negative  Neurological: Positive for numbness  Psychiatric/Behavioral: Negative  Objective:      /66 (BP Location: Right arm, Patient Position: Sitting, Cuff Size: Standard)   Pulse 68   Temp 97 6 °F (36 4 °C) (Tympanic)   Resp 16   Wt 41 4 kg (91 lb 3 2 oz)   BMI 16 68 kg/m²          Physical Exam   Constitutional: She is oriented to person, place, and time  She appears well-developed and well-nourished  HENT:   Head: Normocephalic and atraumatic  Neck: No JVD present  Cardiovascular: Normal rate and regular rhythm  Exam reveals no gallop and no friction rub  No murmur heard  Pulmonary/Chest: Effort normal and breath sounds normal  She has no wheezes  She has no rales  Neurological: She is alert and oriented to person, place, and time  Skin: Skin is warm  There is erythema     Erythema on the sacrum with stage 2 ulcer on the left approximately 2 mm in size      Psychiatric: She has a normal mood and affect   Her behavior is normal

## 2019-01-02 NOTE — PATIENT INSTRUCTIONS
1 - Follow up with neuologist and he may order EMG if indicated  2 - follow up in 2 weeks and continue to monitor skin ulcer on buttock

## 2019-01-18 ENCOUNTER — OFFICE VISIT (OUTPATIENT)
Dept: GERIATRICS | Facility: CLINIC | Age: 84
End: 2019-01-18
Payer: MEDICARE

## 2019-01-18 VITALS
DIASTOLIC BLOOD PRESSURE: 70 MMHG | SYSTOLIC BLOOD PRESSURE: 106 MMHG | HEART RATE: 76 BPM | BODY MASS INDEX: 16.77 KG/M2 | WEIGHT: 91.7 LBS | RESPIRATION RATE: 16 BRPM

## 2019-01-18 DIAGNOSIS — L98.411 SKIN ULCER OF BUTTOCK, LIMITED TO BREAKDOWN OF SKIN (HCC): Primary | ICD-10-CM

## 2019-01-18 PROCEDURE — 99214 OFFICE O/P EST MOD 30 MIN: CPT | Performed by: INTERNAL MEDICINE

## 2019-01-18 NOTE — PATIENT INSTRUCTIONS
1 - discontinue Allevyn GB lite 2 x 2 dressing  2 - start Tegaderm and change daily  3 - encourage fluids

## 2019-01-18 NOTE — PROGRESS NOTES
Assessment/Plan:    No problem-specific Assessment & Plan notes found for this encounter  There are no diagnoses linked to this encounter  Subjective:      Patient ID: Aren Mooney is a 80 y o  female  Patient is an 40-year-old female who presents today for 2 week follow-up  Patient had a grade 1/2 sacral ulcer on her buttocks as being monitored closely by nursing staff  Pressure ulcer is improving  Comorbidities include GERD, hypothyroidism, hearing loss, osteoporosis, osteoarthritis, pressure ulcer, chronic kidney disease, ambulatory dysfunction, cognitive impairment, and restless leg syndrome  The following portions of the patient's history were reviewed and updated as appropriate: allergies, current medications, past family history, past medical history, past social history, past surgical history and problem list     Review of Systems   Constitutional: Negative for activity change, appetite change and fatigue  HENT: Negative for congestion, hearing loss, postnasal drip, sore throat and trouble swallowing  Dry mouth   Eyes: Negative  Respiratory: Negative for cough, shortness of breath and wheezing  Cardiovascular: Negative for chest pain and leg swelling  Gastrointestinal: Negative for abdominal pain, constipation, diarrhea, nausea and vomiting  Endocrine: Positive for polydipsia  Genitourinary: Positive for frequency and urgency  Negative for decreased urine volume, difficulty urinating and dyspareunia  Wears depends   Musculoskeletal: Negative for arthralgias, back pain, gait problem and myalgias  Skin: Negative  Neurological: Negative for dizziness, tremors, weakness, light-headedness, numbness and headaches  Psychiatric/Behavioral: Negative            Objective:      /70 (BP Location: Left arm, Patient Position: Sitting, Cuff Size: Standard)   Pulse 76   Resp 16   Wt 41 6 kg (91 lb 11 2 oz)   BMI 16 77 kg/m²          Physical Exam Constitutional: She is oriented to person, place, and time  She appears well-developed and well-nourished  No distress  HENT:   Head: Normocephalic and atraumatic  Right Ear: External ear normal    Left Ear: External ear normal    Mouth/Throat: Oropharynx is clear and moist    Eyes: Pupils are equal, round, and reactive to light  Conjunctivae are normal    Cardiovascular: Normal rate, regular rhythm and normal heart sounds  Exam reveals no gallop and no friction rub  No murmur heard  Pulmonary/Chest: Effort normal and breath sounds normal  She has no wheezes  She has no rales  Abdominal: Soft  Bowel sounds are normal    Musculoskeletal: She exhibits no edema  Neurological: She is alert and oriented to person, place, and time  Skin: Skin is warm and dry  There is erythema  Stage 1-2 sacral pressure ulcer  No open areas  Lots of nonblanchable erythema in sacral areas  Psychiatric: She has a normal mood and affect

## 2019-01-18 NOTE — ASSESSMENT & PLAN NOTE
Stage 1-2 sacral ulcer a surrounding erythema  Start Tegaderm patch change daily to keep the area dry

## 2019-01-22 ENCOUNTER — OFFICE VISIT (OUTPATIENT)
Dept: NEUROLOGY | Facility: CLINIC | Age: 84
End: 2019-01-22
Payer: MEDICARE

## 2019-01-22 VITALS
WEIGHT: 91 LBS | HEART RATE: 62 BPM | SYSTOLIC BLOOD PRESSURE: 121 MMHG | DIASTOLIC BLOOD PRESSURE: 58 MMHG | BODY MASS INDEX: 16.75 KG/M2 | HEIGHT: 62 IN

## 2019-01-22 DIAGNOSIS — R20.0 NUMBNESS OF LEFT HAND: ICD-10-CM

## 2019-01-22 PROCEDURE — 99204 OFFICE O/P NEW MOD 45 MIN: CPT | Performed by: PSYCHIATRY & NEUROLOGY

## 2019-01-22 RX ORDER — WARFARIN SODIUM 4 MG/1
TABLET ORAL
COMMUNITY
End: 2019-02-01 | Stop reason: ALTCHOICE

## 2019-01-22 RX ORDER — WARFARIN SODIUM 5 MG/1
5 TABLET ORAL
COMMUNITY
End: 2019-02-01 | Stop reason: ALTCHOICE

## 2019-01-22 NOTE — LETTER
January 23, 2019     Tonny Oliver MD  [address] [place] [id number]    Patient: Matias Henderson   YOB: 1933   Date of Visit: 1/22/2019       Dear Dr Eva Richardson: Thank you for referring Mohan Rivas to me for evaluation  Below are my notes for this consultation  If you have questions, please do not hesitate to call me  I look forward to following your patient along with you  Sincerely,        Nick Moss MD        CC: No Recipients  Nick Moss MD  1/23/2019  8:21 AM  Sign at close encounter  Patient ID: Matias Henderson is a 80 y o  female  Assessment/Plan:    Numbness of left hand  Ms  Desiree Sanders has had numbness in her left hand for almost 2 years now, symptoms are intermittent, and only bother her when she is using her walker  She has decreased sensation in both hands in median nerve distributions, I agree 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  In addition, she has significant osteoarthritis with dupuytren's contractures, likely has minimal involvement of the ulnar nerve as well  I offered her electrodiagnostic studies, which she deferred  She does not want to pursue any surgical option right now  Her symptoms only bother her when she is using her walker, she is willing to try the wrist splints, which I recommended for her  She and her daughter know, if her symptoms do get worse, they can contact me and I can bring her in for electrodiagnostic studies  Besides this, she did not have any complaints today  I will see her on an as-needed basis, she and her daughter have my contact information for  any questions or concerns  Thank you very much for allowing me to participate in her care  Diagnoses and all orders for this visit:    Numbness of left hand  -     Ambulatory referral to Neurology  -     Wrist splint for left hand           Subjective:    HPI  I had the pleasure of seeing your patient in Neurology Clinic for a neuromuscular consultation    As you know, she is a 80year old woman who was referred for evaluation for numbness in her hands  Please allow me to summarize her history for the record  She has had numbness in the left hand for more than a year (probably 2)  She notes the symptoms only when she is using the walker, she feels the whole hand gets numb, does not think its the first 3 fingers than the last 2  The numbness only lasts a minute or tw0, and gets better as she shakes her hand  No symptoms in right hand  No wrist pain  She does have significant osteoarthristis, had some surgery on the thumbs to correct that a few years ago  Has dupuytren's contracture in right hand for 20 years  No numbness in feet / No neck or back pain  Strength in both hands is not good, due to arthritis, multiple deformities  Had an Xray of left hand (report not available), has been told she has significant osteoarthritis  The following portions of the patient's history were reviewed and updated as appropriate: allergies, current medications, past family history, past medical history, past social history, past surgical history and problem list     Other medical problems include CKD stage 3, hypothyroidism, Mild cognitive impairment and restless legs syndrome  Objective:    Blood pressure 121/58, pulse 62, height 5' 2" (1 575 m), weight 41 3 kg (91 lb)  Physical Exam   General exam: Pt was awake, alert and oriented  HEENT: atraumatic, normocephalic  Normal oral mucosa, neck was supple, no lymphadenopathy  Normal peripheral pulses  Extremities did not show any edema or cyanosis  Neurological Exam  Neurologically, pt was awake and alert  Speech was normal, no dysarthria or aphasia  Cranial nerve exam showed normal extraocular movements, no nystagmus or diplopia  There was no ptosis at baseline or with sustained upward gaze  Strength of eye closure muscles was normal   Facial sensations were normal bilaterally     No facial weakness, able to blow out the cheeks and push the tongue in the cheeks well  No tongue atrophy or fasciculations  Motor exam revealed normal tone, Bulk is decreased in both hands, bilateral thenar muscles, interossei are relatively preserved  Muscle strength was full in both upper extremities and lower extremities, except APB which was mildly reduced, somewhat due to her inability to perform the command properly  Reflexes were symmetric  Toes were downgoing  There was no exaggerated jaw jerk or adame's sign  No ankle clonus  Sensory exam revealed age appropriate decreased sensation to pinprick and temperature up to ankles bilaterally and mildly reduced vibration at the toes, in addition decreased sensation in the median nerve distribution was noted in both hands  She ambulates with walker  ROS:  I reviewed the below ROS and what is mentioned in HPI, the remainder of ROS was negative  Review of Systems   Constitutional: Negative  Negative for appetite change and fever  HENT: Positive for hearing loss  Negative for tinnitus, trouble swallowing and voice change  Eyes: Negative  Negative for photophobia and pain  Respiratory: Negative  Negative for shortness of breath  Cardiovascular: Negative  Negative for palpitations  Gastrointestinal: Negative  Negative for nausea and vomiting  Endocrine: Negative  Negative for cold intolerance and heat intolerance  Genitourinary: Positive for frequency  Negative for dysuria and urgency  Musculoskeletal: Positive for neck pain  Negative for myalgias  Skin: Negative  Negative for rash  Neurological: Positive for numbness (Left hand)  Negative for dizziness, tremors, seizures, syncope, facial asymmetry, speech difficulty, weakness, light-headedness and headaches  Hematological: Bruises/bleeds easily (Bruise)  Psychiatric/Behavioral: Negative  Negative for confusion, hallucinations and sleep disturbance

## 2019-01-22 NOTE — PROGRESS NOTES
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  In addition, she has significant osteoarthritis with dupuytren's contractures, likely has minimal involvement of the ulnar nerve as well  I offered her electrodiagnostic studies, which she deferred  She does not want to pursue any surgical option right now  Her symptoms only bother her when she is using her walker, she is willing to try the wrist splints, which I recommended for her  She and her daughter know, if her symptoms do get worse, they can contact me and I can bring her in for electrodiagnostic studies  Besides this, she did not have any complaints today  I will see her on an as-needed basis, she and her daughter have my contact information for  any questions or concerns  Thank you very much for allowing me to participate in her care  Diagnoses and all orders for this visit:    Numbness of left hand  -     Ambulatory referral to Neurology  -     Wrist splint for left hand           Subjective:    HPI  I had the pleasure of seeing your patient in Neurology Clinic for a neuromuscular consultation  As you know, she is a 80year old woman who was referred for evaluation for numbness in her hands  Please allow me to summarize her history for the record  She has had numbness in the left hand for more than a year (probably 2)  She notes the symptoms only when she is using the walker, she feels the whole hand gets numb, does not think its the first 3 fingers than the last 2  The numbness only lasts a minute or tw0, and gets better as she shakes her hand  No symptoms in right hand  No wrist pain   She does have significant osteoarthristis, had some surgery on the thumbs to correct that a few years ago  Has dupuytren's contracture in right hand for 20 years  No numbness in feet / No neck or back pain  Strength in both hands is not good, due to arthritis, multiple deformities  Had an Xray of left hand (report not available), has been told she has significant osteoarthritis  The following portions of the patient's history were reviewed and updated as appropriate: allergies, current medications, past family history, past medical history, past social history, past surgical history and problem list     Other medical problems include CKD stage 3, hypothyroidism, Mild cognitive impairment and restless legs syndrome  Objective:    Blood pressure 121/58, pulse 62, height 5' 2" (1 575 m), weight 41 3 kg (91 lb)  Physical Exam   General exam: Pt was awake, alert and oriented  HEENT: atraumatic, normocephalic  Normal oral mucosa, neck was supple, no lymphadenopathy  Normal peripheral pulses  Extremities did not show any edema or cyanosis  Neurological Exam  Neurologically, pt was awake and alert  Speech was normal, no dysarthria or aphasia  Cranial nerve exam showed normal extraocular movements, no nystagmus or diplopia  There was no ptosis at baseline or with sustained upward gaze  Strength of eye closure muscles was normal   Facial sensations were normal bilaterally  No facial weakness, able to blow out the cheeks and push the tongue in the cheeks well  No tongue atrophy or fasciculations  Motor exam revealed normal tone, Bulk is decreased in both hands, bilateral thenar muscles, interossei are relatively preserved  Muscle strength was full in both upper extremities and lower extremities, except APB which was mildly reduced, somewhat due to her inability to perform the command properly  Reflexes were symmetric  Toes were downgoing  There was no exaggerated jaw jerk or adame's sign  No ankle clonus    Sensory exam revealed age appropriate decreased sensation to pinprick and temperature up to ankles bilaterally and mildly reduced vibration at the toes, in addition decreased sensation in the median nerve distribution was noted in both hands  She ambulates with walker  ROS:  I reviewed the below ROS and what is mentioned in HPI, the remainder of ROS was negative  Review of Systems   Constitutional: Negative  Negative for appetite change and fever  HENT: Positive for hearing loss  Negative for tinnitus, trouble swallowing and voice change  Eyes: Negative  Negative for photophobia and pain  Respiratory: Negative  Negative for shortness of breath  Cardiovascular: Negative  Negative for palpitations  Gastrointestinal: Negative  Negative for nausea and vomiting  Endocrine: Negative  Negative for cold intolerance and heat intolerance  Genitourinary: Positive for frequency  Negative for dysuria and urgency  Musculoskeletal: Positive for neck pain  Negative for myalgias  Skin: Negative  Negative for rash  Neurological: Positive for numbness (Left hand)  Negative for dizziness, tremors, seizures, syncope, facial asymmetry, speech difficulty, weakness, light-headedness and headaches  Hematological: Bruises/bleeds easily (Bruise)  Psychiatric/Behavioral: Negative  Negative for confusion, hallucinations and sleep disturbance

## 2019-01-23 NOTE — ASSESSMENT & PLAN NOTE
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  In addition, she has significant osteoarthritis with dupuytren's contractures, likely has minimal involvement of the ulnar nerve as well  I offered her electrodiagnostic studies, which she deferred  She does not want to pursue any surgical option right now  Her symptoms only bother her when she is using her walker, she is willing to try the wrist splints, which I recommended for her  She and her daughter know, if her symptoms do get worse, they can contact me and I can bring her in for electrodiagnostic studies  Besides this, she did not have any complaints today  I will see her on an as-needed basis, she and her daughter have my contact information for  any questions or concerns  Thank you very much for allowing me to participate in her care

## 2019-02-01 ENCOUNTER — OFFICE VISIT (OUTPATIENT)
Dept: GERIATRICS | Facility: CLINIC | Age: 84
End: 2019-02-01
Payer: MEDICARE

## 2019-02-01 VITALS
BODY MASS INDEX: 16.84 KG/M2 | OXYGEN SATURATION: 98 % | RESPIRATION RATE: 16 BRPM | TEMPERATURE: 97.4 F | DIASTOLIC BLOOD PRESSURE: 66 MMHG | WEIGHT: 91.5 LBS | HEIGHT: 62 IN | HEART RATE: 79 BPM | SYSTOLIC BLOOD PRESSURE: 112 MMHG

## 2019-02-01 DIAGNOSIS — L89.152 PRESSURE INJURY OF SACRAL REGION, STAGE 2 (HCC): Primary | ICD-10-CM

## 2019-02-01 PROCEDURE — 99213 OFFICE O/P EST LOW 20 MIN: CPT | Performed by: INTERNAL MEDICINE

## 2019-02-01 RX ORDER — ACETAMINOPHEN 325 MG/1
TABLET ORAL
COMMUNITY

## 2019-02-01 NOTE — PROGRESS NOTES
Assessment/Plan:    Skin ulcer of buttock ([address] )  I evaluated the ulcer in the office today and it is improving  She should continue her current therapy  Carpal tunnel syndrome  She saw neurology who suggested continuing with wrist splints and electrical diagnosis if needed  Diagnoses and all orders for this visit:    Pressure injury of sacral region, stage 2    Other orders  -     acetaminophen (TYLENOL) 325 mg tablet; Take two tablets by mouth every 6 hours as needed          Subjective:      Patient ID: Jorge Tong is a 80 y o  female  HPI    Ms Riaz Gaxiola is an 80year old female presenting for a two week follow up for a two week follow up for a sacral ulcer on her buttocks  At the last visit the ulcer was stage 1-2  Today, I looked at her sacral ulcer which is improving and she reports that it is much less painful now  She recently saw neurology who assessed that she likely has bilateral carpal tunnel syndrome and suggested she use wrist splints and to return for electrodiagnostic studies if needed  The following portions of the patient's history were reviewed and updated as appropriate: allergies, current medications, past family history, past medical history, past social history, past surgical history and problem list     Review of Systems   Constitutional: Negative  HENT: Positive for hearing loss  Eyes: Negative  Respiratory: Negative  Cardiovascular: Negative  Gastrointestinal: Negative  Endocrine: Negative  Genitourinary: Negative  Musculoskeletal: Negative  Skin: Negative  Allergic/Immunologic: Negative  Neurological: Negative  Hematological: Negative  Psychiatric/Behavioral: Negative            Objective:      /66 (BP Location: Right arm, Patient Position: Sitting, Cuff Size: Standard)   Pulse 79   Temp (!) 97 4 °F (36 3 °C) (Tympanic)   Resp 16   Ht 5' 2" (1 575 m)   Wt 41 5 kg (91 lb 8 oz)   SpO2 98%   BMI 16 74 kg/m² Physical Exam   HENT:   Right Ear: External ear normal    Left Ear: External ear normal    Mouth/Throat: Oropharynx is clear and moist    Cardiovascular: Normal rate, regular rhythm and normal heart sounds  Pulmonary/Chest: Effort normal and breath sounds normal  No respiratory distress  She has no wheezes  She has no rales  She exhibits no tenderness  Skin:   Sacral ulcer is improving

## 2019-02-01 NOTE — PATIENT INSTRUCTIONS
1  Continue wound care for the sacral ulcer    2  Continue wearing wrist splints for carpal tunnel syndrome

## 2019-02-01 NOTE — ASSESSMENT & PLAN NOTE
I evaluated the ulcer in the office today and it is improving  She should continue her current therapy

## 2019-02-05 ENCOUNTER — OFFICE VISIT (OUTPATIENT)
Dept: GERIATRICS | Facility: CLINIC | Age: 84
End: 2019-02-05
Payer: MEDICARE

## 2019-02-05 VITALS
DIASTOLIC BLOOD PRESSURE: 62 MMHG | HEART RATE: 75 BPM | SYSTOLIC BLOOD PRESSURE: 120 MMHG | WEIGHT: 92 LBS | TEMPERATURE: 98.2 F | HEIGHT: 62 IN | BODY MASS INDEX: 16.93 KG/M2 | OXYGEN SATURATION: 94 % | RESPIRATION RATE: 16 BRPM

## 2019-02-05 DIAGNOSIS — L30.8 DERMATITIS ASSOCIATED WITH MOISTURE: Primary | ICD-10-CM

## 2019-02-05 DIAGNOSIS — M54.2 NECK PAIN: ICD-10-CM

## 2019-02-05 PROCEDURE — 99213 OFFICE O/P EST LOW 20 MIN: CPT | Performed by: NURSE PRACTITIONER

## 2019-02-05 NOTE — ASSESSMENT & PLAN NOTE
· Right side neck pain along trapezius muscle  · Started this am  · Schedule bid tylenol and lido patch x1 week  · Notify office if no relief

## 2019-02-05 NOTE — ASSESSMENT & PLAN NOTE
· Pt has large area of MAD with sm skin tear on left buttock  · Would d/c allevyn and instead use zinc oxide bid after cleansing with soap/water and mineral oil to ensure clean skin  · Encourage tolieting q2-3hr to keep pt from incontinence  · Offload pressure by encouraging pt to turn and reposition frequently  · Notify office if skin worsens

## 2019-02-05 NOTE — PATIENT INSTRUCTIONS
· BID tylenol and lido patch to right neck x1 week - notify if pain is worsening  · D/c solisite and allevyn to buttocks  Instead use zinc oxide bid and prn  NOtify if worsening

## 2019-02-05 NOTE — PROGRESS NOTES
Assessment/Plan:    Dermatitis associated with moisture  · Pt has large area of MAD with sm skin tear on left buttock  · Would d/c allevyn and instead use zinc oxide bid after cleansing with soap/water and mineral oil to ensure clean skin  · Encourage tolieting q2-3hr to keep pt from incontinence  · Offload pressure by encouraging pt to turn and reposition frequently  · Notify office if skin worsens  Neck pain  · Right side neck pain along trapezius muscle  · Started this am  · Schedule bid tylenol and lido patch x1 week  · Notify office if no relief  Diagnoses and all orders for this visit:    Dermatitis associated with moisture    Neck pain        Subjective:      Patient ID: Figueroa Mayo is a 80 y o  female  Nursing requested visit to review buttock skin care orders  Also pt has neck pain today  Nursing reports improvement on left buttock, but now with scabbed area right side buttock  No open areas  Pt is incontinent of urine regularly  Skin is not sore to patient  This has been ongoing problem  Patient states she woke up today with very sore neck - on right side tender in area of trapezius muscle into head  Denies falling, denies lifting heavy object, denies any specific sharp movements that she could remember        The following portions of the patient's history were reviewed and updated as appropriate: past family history, past medical history, past social history and past surgical history  Review of Systems   Genitourinary: Positive for difficulty urinating (urinary incont)  Musculoskeletal: Positive for myalgias and neck pain  Neurological: Negative            Objective:      /62 (BP Location: Right arm, Patient Position: Sitting, Cuff Size: Standard)   Pulse 75   Temp 98 2 °F (36 8 °C) (Tympanic)   Resp 16   Ht 5' 2" (1 575 m)   Wt 41 7 kg (92 lb)   SpO2 94%   BMI 16 83 kg/m²          Physical Exam   Constitutional: She appears well-developed and well-nourished  No distress  Musculoskeletal:   Neck tenderness noted with sideways movement, less with up/down movement  No erythema, edema, open areas, rash on left side of neck  No radiculopathy   Skin: Skin is warm and dry  She is not diaphoretic  Large area of erythema on bilat buttocks consistent with MAD  Sm 1cm x 1cm area of 3 scabs on right buttock - no s/s infection, no drainage   Nursing note and vitals reviewed

## 2019-02-15 ENCOUNTER — CLINICAL SUPPORT (OUTPATIENT)
Dept: GERIATRICS | Facility: CLINIC | Age: 84
End: 2019-02-15
Payer: MEDICARE

## 2019-02-15 DIAGNOSIS — M81.0 AGE-RELATED OSTEOPOROSIS WITHOUT CURRENT PATHOLOGICAL FRACTURE: Primary | ICD-10-CM

## 2019-02-15 PROCEDURE — 96372 THER/PROPH/DIAG INJ SC/IM: CPT | Performed by: INTERNAL MEDICINE

## 2019-02-15 NOTE — PROGRESS NOTES
Pt came to the office to have Prolia administered 60mg was given SQ in L upper arm  Pt tolerated meds and will return in 6mth for her next injection  [facility] [id number] [facility]#[id number] exp 06/21

## 2019-03-22 ENCOUNTER — OFFICE VISIT (OUTPATIENT)
Dept: GERIATRICS | Facility: CLINIC | Age: 84
End: 2019-03-22
Payer: MEDICARE

## 2019-03-22 VITALS
HEART RATE: 95 BPM | RESPIRATION RATE: 16 BRPM | WEIGHT: 89.1 LBS | OXYGEN SATURATION: 96 % | SYSTOLIC BLOOD PRESSURE: 102 MMHG | DIASTOLIC BLOOD PRESSURE: 60 MMHG | BODY MASS INDEX: 16.4 KG/M2 | HEIGHT: 62 IN

## 2019-03-22 DIAGNOSIS — R20.0 NUMBNESS OF LEFT HAND: ICD-10-CM

## 2019-03-22 DIAGNOSIS — R26.2 AMBULATORY DYSFUNCTION: ICD-10-CM

## 2019-03-22 DIAGNOSIS — M25.532 WRIST PAIN, LEFT: ICD-10-CM

## 2019-03-22 DIAGNOSIS — G56.02 CARPAL TUNNEL SYNDROME OF LEFT WRIST: Primary | ICD-10-CM

## 2019-03-22 PROCEDURE — 99213 OFFICE O/P EST LOW 20 MIN: CPT | Performed by: INTERNAL MEDICINE

## 2019-03-22 NOTE — PROGRESS NOTES
NAME: Danielle Masterson  AGE: 80 y o  SEX: female    DATE OF ENCOUNTER: 3/22/2019    Assessment and Plan     Problem List Items Addressed This Visit        Nervous and Auditory    Carpal tunnel syndrome - Primary     Saw Neurology, has splints, encourage use at night, leave off during day, also has made adaption for rolling walker for more padding which has been helpful  Continue splint overnight and will return in one week to re-eval             Other    Wrist pain, left    Ambulatory dysfunction     Continue use of rolling walker         Numbness of left hand     Secondary to carpal tunnel, continue use of splint and stretches provided to patient  - Counseling Documentation: patient was counseled regarding: diagnostic results, instructions for management, risk factor reductions, prognosis, patient and family education, impressions, risks and benefits of treatment options and importance of compliance with treatment    Chief Complaint     Chief Complaint   Patient presents with    Follow-up     Wound check- Left buttocks     History of Present Illness     Janene Davis presents today for follow-up of left wrist carpal tunnel  She has been using splints but has been using them during the day which has made it very difficult to use her walker which she has added a padded cushion to which has been helpful  The following portions of the patient's history were reviewed and updated as appropriate: allergies, current medications, past family history, past medical history, past social history, past surgical history and problem list     Review of Systems     Review of Systems   Constitutional: Negative for appetite change, chills and fever  HENT: Negative  Cardiovascular: Negative  Gastrointestinal: Negative  Musculoskeletal: Positive for gait problem (requires rolling walker)  Skin:        Ecchymosis bilateral hands in thenar eminence   Neurological: Positive for weakness     Psychiatric/Behavioral: Negative  All other systems reviewed and are negative  Active Problem List     Patient Active Problem List   Diagnosis    Ambulatory dysfunction    Anemia    Back pain    Chronic kidney disease, stage III (moderate) ([facility])    Cognitive impairment    Constipation    Cough    Depression    Diarrhea    GERD (gastroesophageal reflux disease)    Hearing loss    Hypothyroidism    Mixed incontinence    Muscle contusion    Osteoporosis    Osteoarthritis    RLS (restless legs syndrome)    Short-term memory loss    Strain of knee, right, initial encounter    Wrist pain, left    Swelling of left hand    Foreign body in digestive tract    Skin ulcer of buttock (HCC)    Hoarseness or changing voice    Impacted cerumen of left ear    Weight loss    Numbness of left hand    Dermatitis associated with moisture    Pressure ulcer, sacrum    Carpal tunnel syndrome    Neck pain     Objective     /60 (BP Location: Left arm, Patient Position: Sitting, Cuff Size: Standard)   Pulse 95   Resp 16   Ht 5' 2" (1 575 m)   Wt 40 4 kg (89 lb 1 6 oz)   SpO2 96%   BMI 16 30 kg/m²     Physical Exam   Constitutional: She appears well-developed  No distress  Appears stated age, thin and frail   HENT:   Head: Normocephalic and atraumatic  Eyes: EOM are normal    Neck: No JVD present  No tracheal deviation present  Cardiovascular: Normal rate, regular rhythm, normal heart sounds and intact distal pulses  No murmur heard  Pulmonary/Chest: Effort normal and breath sounds normal  No respiratory distress  Abdominal: Soft  Musculoskeletal: She exhibits deformity (scoliotic spine)  Neurological: She is alert  Skin: Skin is warm and dry  She is not diaphoretic  Psychiatric: She has a normal mood and affect  Nursing note and vitals reviewed      Pertinent Laboratory/Diagnostic Studies:  No new labs for review    Current Medications     Current Outpatient Medications:     acetaminophen (TYLENOL) 325 mg tablet, Take two tablets by mouth every 6 hours as needed, Disp: , Rfl:     calcium carbonate (OS-CHLOÉ) 600 MG tablet, Take 600 mg by mouth daily, Disp: , Rfl:     denosumab (PROLIA) 60 mg/mL, Inject under the skin every 6 (six) months, Disp: , Rfl:     docusate sodium (COLACE) 100 mg capsule, Take 100 mg by mouth daily, Disp: , Rfl:     ferrous sulfate 325 (65 Fe) mg tablet, Take 325 mg by mouth daily at bedtime, Disp: , Rfl:     folic acid (FOLVITE) 250 mcg tablet, Take 400 mcg by mouth daily, Disp: , Rfl:     Magnesium Hydroxide (MILK OF MAGNESIA PO), Take by mouth ADMIN 30ML PO PRN, Disp: , Rfl:     mirtazapine (REMERON) 15 mg tablet, Take 15 mg by mouth daily at bedtime, Disp: , Rfl:     Multiple Vitamin (DAILY JAMSE) TABS, Take by mouth daily, Disp: , Rfl:     Nutritional Supplements (ENSURE ACTIVE) LIQD, Take by mouth daily, Disp: , Rfl:     phenylephrine (JUNIOR-SYNEPHRINE) 1 % nasal spray, Administer as needed, Disp: , Rfl:     potassium chloride (MICRO-K) 10 MEQ CR capsule, Take 10 mEq by mouth 2 (two) times a day, Disp: , Rfl:     rOPINIRole (REQUIP) 2 mg tablet, Take 2 mg by mouth daily, Disp: , Rfl:     zinc oxide (DESITIN) 13 % cream, Apply topically daily as needed, Disp: , Rfl:     Health Maintenance     Health Maintenance   Topic Date Due    Medicare Annual Wellness Visit (AWV)  1933    BMI: Followup Plan  12[birth year]20/1951    Fall Risk  12/20/1998    Urinary Incontinence Screening  12/20/1998    DTaP,Tdap,and Td Vaccines (1 - Tdap) 07/22/2011    BMI: Adult  02/05/2020    INFLUENZA VACCINE  Completed    Pneumococcal PPSV23/PCV13 65+ Years / High and Highest Risk  Completed    HEPATITIS B VACCINES  Aged Out     Immunization History   Administered Date(s) Administered    DT (pediatric) 07/21/2011    Influenza Split High Dose Preservative Free IM 11/02/2009, 11/02/2010, 11/16/2011, 10/30/2013, 10/28/2014, 10/20/2015, 10/28/2018    Influenza TIV (IM) 10/23/2012    Pneumococcal Polysaccharide PPV23 01/15/2015    Zoster 10/09/2012       Jcarlos GRANADO    Geriatrics Fellow   3/22/2019 12:57 PM

## 2019-03-22 NOTE — ASSESSMENT & PLAN NOTE
Saw Neurology, has splints, encourage use at night, leave off during day, also has made adaption for rolling walker for more padding which has been helpful   Continue splint overnight and will return in one week to re-eval

## 2019-04-16 ENCOUNTER — OFFICE VISIT (OUTPATIENT)
Dept: GERIATRICS | Facility: CLINIC | Age: 84
End: 2019-04-16
Payer: MEDICARE

## 2019-04-16 VITALS
DIASTOLIC BLOOD PRESSURE: 80 MMHG | HEIGHT: 62 IN | OXYGEN SATURATION: 94 % | WEIGHT: 93 LBS | SYSTOLIC BLOOD PRESSURE: 130 MMHG | BODY MASS INDEX: 17.11 KG/M2 | HEART RATE: 78 BPM

## 2019-04-16 DIAGNOSIS — L30.8 DERMATITIS ASSOCIATED WITH MOISTURE: Primary | ICD-10-CM

## 2019-04-16 PROCEDURE — 99213 OFFICE O/P EST LOW 20 MIN: CPT | Performed by: NURSE PRACTITIONER

## 2019-04-19 ENCOUNTER — OFFICE VISIT (OUTPATIENT)
Dept: GERIATRICS | Facility: CLINIC | Age: 84
End: 2019-04-19
Payer: MEDICARE

## 2019-04-19 VITALS — DIASTOLIC BLOOD PRESSURE: 54 MMHG | HEART RATE: 67 BPM | OXYGEN SATURATION: 94 % | SYSTOLIC BLOOD PRESSURE: 100 MMHG

## 2019-04-19 DIAGNOSIS — L98.411 SKIN ULCER OF BUTTOCK, LIMITED TO BREAKDOWN OF SKIN (HCC): Primary | ICD-10-CM

## 2019-04-19 DIAGNOSIS — H91.93 BILATERAL HEARING LOSS, UNSPECIFIED HEARING LOSS TYPE: ICD-10-CM

## 2019-04-19 DIAGNOSIS — G56.02 CARPAL TUNNEL SYNDROME OF LEFT WRIST: ICD-10-CM

## 2019-04-19 DIAGNOSIS — E03.8 OTHER SPECIFIED HYPOTHYROIDISM: ICD-10-CM

## 2019-04-19 DIAGNOSIS — G31.84 MCI (MILD COGNITIVE IMPAIRMENT): ICD-10-CM

## 2019-04-19 PROCEDURE — 99214 OFFICE O/P EST MOD 30 MIN: CPT | Performed by: INTERNAL MEDICINE

## 2019-05-03 ENCOUNTER — OFFICE VISIT (OUTPATIENT)
Dept: GERIATRICS | Facility: CLINIC | Age: 84
End: 2019-05-03
Payer: MEDICARE

## 2019-05-03 VITALS
HEART RATE: 68 BPM | OXYGEN SATURATION: 91 % | WEIGHT: 92 LBS | BODY MASS INDEX: 16.83 KG/M2 | RESPIRATION RATE: 16 BRPM | DIASTOLIC BLOOD PRESSURE: 60 MMHG | SYSTOLIC BLOOD PRESSURE: 110 MMHG

## 2019-05-03 DIAGNOSIS — L89.159 PRESSURE INJURY OF SKIN OF SACRAL REGION, UNSPECIFIED INJURY STAGE: Primary | ICD-10-CM

## 2019-05-03 PROCEDURE — 99213 OFFICE O/P EST LOW 20 MIN: CPT | Performed by: INTERNAL MEDICINE

## 2019-05-03 RX ORDER — PETROLATUM 0.61 G/G
CREAM TOPICAL 2 TIMES DAILY
Qty: 397 G | Refills: 0 | Status: SHIPPED | OUTPATIENT
Start: 2019-05-03 | End: 2019-05-31 | Stop reason: HOSPADM

## 2019-05-03 RX ORDER — PETROLATUM 0.61 G/G
CREAM TOPICAL 2 TIMES DAILY
Qty: 397 G | Refills: 0 | Status: SHIPPED | OUTPATIENT
Start: 2019-05-03 | End: 2019-05-03

## 2019-05-17 ENCOUNTER — OFFICE VISIT (OUTPATIENT)
Dept: GERIATRICS | Facility: CLINIC | Age: 84
End: 2019-05-17
Payer: MEDICARE

## 2019-05-17 VITALS
WEIGHT: 90.4 LBS | BODY MASS INDEX: 16.63 KG/M2 | SYSTOLIC BLOOD PRESSURE: 130 MMHG | HEIGHT: 62 IN | OXYGEN SATURATION: 95 % | HEART RATE: 73 BPM | DIASTOLIC BLOOD PRESSURE: 80 MMHG

## 2019-05-17 DIAGNOSIS — E44.1 MILD PROTEIN-CALORIE MALNUTRITION (HCC): ICD-10-CM

## 2019-05-17 DIAGNOSIS — L89.152 PRESSURE INJURY OF SACRAL REGION, STAGE 2 (HCC): ICD-10-CM

## 2019-05-17 DIAGNOSIS — L98.411 SKIN ULCER OF BUTTOCK, LIMITED TO BREAKDOWN OF SKIN (HCC): Primary | ICD-10-CM

## 2019-05-17 PROCEDURE — 99213 OFFICE O/P EST LOW 20 MIN: CPT | Performed by: INTERNAL MEDICINE

## 2019-05-26 ENCOUNTER — HOSPITAL ENCOUNTER (INPATIENT)
Facility: HOSPITAL | Age: 84
LOS: 5 days | Discharge: NURSING, REHAB OR LONG-TERM CARE | DRG: 871 | End: 2019-05-31
Attending: EMERGENCY MEDICINE | Admitting: INTERNAL MEDICINE
Payer: MEDICARE

## 2019-05-26 ENCOUNTER — APPOINTMENT (EMERGENCY)
Dept: RADIOLOGY | Facility: HOSPITAL | Age: 84
DRG: 871 | End: 2019-05-26
Payer: MEDICARE

## 2019-05-26 DIAGNOSIS — A41.9 SEPSIS SECONDARY TO UTI (HCC): ICD-10-CM

## 2019-05-26 DIAGNOSIS — N39.0 URINARY TRACT INFECTION: ICD-10-CM

## 2019-05-26 DIAGNOSIS — N13.39 OTHER HYDRONEPHROSIS: ICD-10-CM

## 2019-05-26 DIAGNOSIS — L89.152 PRESSURE INJURY OF SACRAL REGION, STAGE 2 (HCC): ICD-10-CM

## 2019-05-26 DIAGNOSIS — R78.81 BACTEREMIA: ICD-10-CM

## 2019-05-26 DIAGNOSIS — N39.0 SEPSIS SECONDARY TO UTI (HCC): ICD-10-CM

## 2019-05-26 DIAGNOSIS — D72.829 LEUKOCYTOSIS: ICD-10-CM

## 2019-05-26 DIAGNOSIS — A41.9 SEPSIS (HCC): Primary | ICD-10-CM

## 2019-05-26 DIAGNOSIS — N13.30 HYDRONEPHROSIS: ICD-10-CM

## 2019-05-26 PROBLEM — N18.9 ACUTE KIDNEY INJURY SUPERIMPOSED ON CHRONIC KIDNEY DISEASE (HCC): Status: ACTIVE | Noted: 2019-05-26

## 2019-05-26 PROBLEM — N17.9 ACUTE KIDNEY INJURY SUPERIMPOSED ON CHRONIC KIDNEY DISEASE (HCC): Status: ACTIVE | Noted: 2019-05-26

## 2019-05-26 LAB
ALBUMIN SERPL BCP-MCNC: 2.4 G/DL (ref 3.5–5)
ALP SERPL-CCNC: 114 U/L (ref 46–116)
ALT SERPL W P-5'-P-CCNC: 83 U/L (ref 12–78)
ANION GAP SERPL CALCULATED.3IONS-SCNC: 8 MMOL/L (ref 4–13)
APTT PPP: 32 SECONDS (ref 26–38)
AST SERPL W P-5'-P-CCNC: 140 U/L (ref 5–45)
BACTERIA UR QL AUTO: ABNORMAL /HPF
BASOPHILS # BLD AUTO: 0.02 THOUSANDS/ΜL (ref 0–0.1)
BASOPHILS NFR BLD AUTO: 0 % (ref 0–1)
BILIRUB SERPL-MCNC: 0.65 MG/DL (ref 0.2–1)
BILIRUB UR QL STRIP: NEGATIVE
BUN SERPL-MCNC: 33 MG/DL (ref 5–25)
CALCIUM SERPL-MCNC: 8.2 MG/DL (ref 8.3–10.1)
CHLORIDE SERPL-SCNC: 110 MMOL/L (ref 100–108)
CLARITY UR: ABNORMAL
CO2 SERPL-SCNC: 16 MMOL/L (ref 21–32)
COLOR UR: YELLOW
COLOR, POC: YELLOW
CREAT SERPL-MCNC: 2.04 MG/DL (ref 0.6–1.3)
EOSINOPHIL # BLD AUTO: 0 THOUSAND/ΜL (ref 0–0.61)
EOSINOPHIL NFR BLD AUTO: 0 % (ref 0–6)
ERYTHROCYTE [DISTWIDTH] IN BLOOD BY AUTOMATED COUNT: 13.1 % (ref 11.6–15.1)
GFR SERPL CREATININE-BSD FRML MDRD: 22 ML/MIN/1.73SQ M
GLUCOSE SERPL-MCNC: 115 MG/DL (ref 65–140)
GLUCOSE UR STRIP-MCNC: NEGATIVE MG/DL
HCT VFR BLD AUTO: 37.3 % (ref 34.8–46.1)
HGB BLD-MCNC: 12 G/DL (ref 11.5–15.4)
HGB UR QL STRIP.AUTO: ABNORMAL
HYALINE CASTS #/AREA URNS LPF: ABNORMAL /LPF
IMM GRANULOCYTES # BLD AUTO: 0.16 THOUSAND/UL (ref 0–0.2)
IMM GRANULOCYTES NFR BLD AUTO: 1 % (ref 0–2)
INR PPP: 1.13 (ref 0.86–1.17)
KETONES UR STRIP-MCNC: NEGATIVE MG/DL
LACTATE SERPL-SCNC: 1.9 MMOL/L (ref 0.5–2)
LEUKOCYTE ESTERASE UR QL STRIP: ABNORMAL
LYMPHOCYTES # BLD AUTO: 0.27 THOUSANDS/ΜL (ref 0.6–4.47)
LYMPHOCYTES NFR BLD AUTO: 2 % (ref 14–44)
MCH RBC QN AUTO: 31.7 PG (ref 26.8–34.3)
MCHC RBC AUTO-ENTMCNC: 32.2 G/DL (ref 31.4–37.4)
MCV RBC AUTO: 98 FL (ref 82–98)
MONOCYTES # BLD AUTO: 1.39 THOUSAND/ΜL (ref 0.17–1.22)
MONOCYTES NFR BLD AUTO: 8 % (ref 4–12)
NEUTROPHILS # BLD AUTO: 14.82 THOUSANDS/ΜL (ref 1.85–7.62)
NEUTS SEG NFR BLD AUTO: 89 % (ref 43–75)
NITRITE UR QL STRIP: POSITIVE
NON-SQ EPI CELLS URNS QL MICRO: ABNORMAL /HPF
NRBC BLD AUTO-RTO: 0 /100 WBCS
PH UR STRIP.AUTO: 7 [PH] (ref 4.5–8)
PLATELET # BLD AUTO: 133 THOUSANDS/UL (ref 149–390)
PMV BLD AUTO: 10.7 FL (ref 8.9–12.7)
POTASSIUM SERPL-SCNC: 4 MMOL/L (ref 3.5–5.3)
PROT SERPL-MCNC: 5.4 G/DL (ref 6.4–8.2)
PROT UR STRIP-MCNC: ABNORMAL MG/DL
PROTHROMBIN TIME: 14.6 SECONDS (ref 11.8–14.2)
RBC # BLD AUTO: 3.79 MILLION/UL (ref 3.81–5.12)
RBC #/AREA URNS AUTO: ABNORMAL /HPF
SODIUM SERPL-SCNC: 134 MMOL/L (ref 136–145)
SP GR UR STRIP.AUTO: 1.01 (ref 1–1.03)
TROPONIN I SERPL-MCNC: <0.02 NG/ML
UROBILINOGEN UR QL STRIP.AUTO: 0.2 E.U./DL
WBC # BLD AUTO: 16.66 THOUSAND/UL (ref 4.31–10.16)
WBC #/AREA URNS AUTO: ABNORMAL /HPF

## 2019-05-26 PROCEDURE — 85610 PROTHROMBIN TIME: CPT | Performed by: EMERGENCY MEDICINE

## 2019-05-26 PROCEDURE — 80053 COMPREHEN METABOLIC PANEL: CPT | Performed by: EMERGENCY MEDICINE

## 2019-05-26 PROCEDURE — 84484 ASSAY OF TROPONIN QUANT: CPT | Performed by: EMERGENCY MEDICINE

## 2019-05-26 PROCEDURE — 87040 BLOOD CULTURE FOR BACTERIA: CPT | Performed by: EMERGENCY MEDICINE

## 2019-05-26 PROCEDURE — 96361 HYDRATE IV INFUSION ADD-ON: CPT

## 2019-05-26 PROCEDURE — 36415 COLL VENOUS BLD VENIPUNCTURE: CPT | Performed by: EMERGENCY MEDICINE

## 2019-05-26 PROCEDURE — 99285 EMERGENCY DEPT VISIT HI MDM: CPT | Performed by: EMERGENCY MEDICINE

## 2019-05-26 PROCEDURE — 81001 URINALYSIS AUTO W/SCOPE: CPT

## 2019-05-26 PROCEDURE — 85730 THROMBOPLASTIN TIME PARTIAL: CPT | Performed by: EMERGENCY MEDICINE

## 2019-05-26 PROCEDURE — 87086 URINE CULTURE/COLONY COUNT: CPT

## 2019-05-26 PROCEDURE — 87077 CULTURE AEROBIC IDENTIFY: CPT | Performed by: EMERGENCY MEDICINE

## 2019-05-26 PROCEDURE — 99285 EMERGENCY DEPT VISIT HI MDM: CPT

## 2019-05-26 PROCEDURE — 87186 SC STD MICRODIL/AGAR DIL: CPT | Performed by: EMERGENCY MEDICINE

## 2019-05-26 PROCEDURE — 87186 SC STD MICRODIL/AGAR DIL: CPT

## 2019-05-26 PROCEDURE — 71046 X-RAY EXAM CHEST 2 VIEWS: CPT

## 2019-05-26 PROCEDURE — 96365 THER/PROPH/DIAG IV INF INIT: CPT

## 2019-05-26 PROCEDURE — 87077 CULTURE AEROBIC IDENTIFY: CPT

## 2019-05-26 PROCEDURE — 85025 COMPLETE CBC W/AUTO DIFF WBC: CPT | Performed by: EMERGENCY MEDICINE

## 2019-05-26 PROCEDURE — 83605 ASSAY OF LACTIC ACID: CPT | Performed by: EMERGENCY MEDICINE

## 2019-05-26 PROCEDURE — 99223 1ST HOSP IP/OBS HIGH 75: CPT | Performed by: INTERNAL MEDICINE

## 2019-05-26 PROCEDURE — 93005 ELECTROCARDIOGRAM TRACING: CPT

## 2019-05-26 RX ORDER — CALCIUM CARBONATE 500(1250)
1 TABLET ORAL
Status: DISCONTINUED | OUTPATIENT
Start: 2019-05-27 | End: 2019-05-31 | Stop reason: HOSPADM

## 2019-05-26 RX ORDER — LANOLIN ALCOHOL/MO/W.PET/CERES
400 CREAM (GRAM) TOPICAL DAILY
Status: DISCONTINUED | OUTPATIENT
Start: 2019-05-27 | End: 2019-05-31 | Stop reason: HOSPADM

## 2019-05-26 RX ORDER — SODIUM CHLORIDE, SODIUM GLUCONATE, SODIUM ACETATE, POTASSIUM CHLORIDE, MAGNESIUM CHLORIDE, SODIUM PHOSPHATE, DIBASIC, AND POTASSIUM PHOSPHATE .53; .5; .37; .037; .03; .012; .00082 G/100ML; G/100ML; G/100ML; G/100ML; G/100ML; G/100ML; G/100ML
75 INJECTION, SOLUTION INTRAVENOUS CONTINUOUS
Status: DISCONTINUED | OUTPATIENT
Start: 2019-05-26 | End: 2019-05-28

## 2019-05-26 RX ORDER — LANOLIN ALCOHOL/MO/W.PET/CERES
CREAM (GRAM) TOPICAL 2 TIMES DAILY
Status: DISCONTINUED | OUTPATIENT
Start: 2019-05-26 | End: 2019-05-29

## 2019-05-26 RX ORDER — MIRTAZAPINE 15 MG/1
15 TABLET, FILM COATED ORAL
Status: DISCONTINUED | OUTPATIENT
Start: 2019-05-26 | End: 2019-05-31 | Stop reason: HOSPADM

## 2019-05-26 RX ORDER — ACETAMINOPHEN 325 MG/1
325 TABLET ORAL EVERY 6 HOURS PRN
Status: DISCONTINUED | OUTPATIENT
Start: 2019-05-26 | End: 2019-05-31 | Stop reason: HOSPADM

## 2019-05-26 RX ORDER — DOCUSATE SODIUM 100 MG/1
100 CAPSULE, LIQUID FILLED ORAL DAILY
Status: DISCONTINUED | OUTPATIENT
Start: 2019-05-27 | End: 2019-05-31 | Stop reason: HOSPADM

## 2019-05-26 RX ORDER — HEPARIN SODIUM 5000 [USP'U]/ML
5000 INJECTION, SOLUTION INTRAVENOUS; SUBCUTANEOUS EVERY 8 HOURS SCHEDULED
Status: DISCONTINUED | OUTPATIENT
Start: 2019-05-26 | End: 2019-05-31 | Stop reason: HOSPADM

## 2019-05-26 RX ORDER — ROPINIROLE 2 MG/1
2 TABLET, FILM COATED ORAL DAILY
Status: DISCONTINUED | OUTPATIENT
Start: 2019-05-27 | End: 2019-05-31 | Stop reason: HOSPADM

## 2019-05-26 RX ADMIN — MIRTAZAPINE 15 MG: 15 TABLET, FILM COATED ORAL at 22:14

## 2019-05-26 RX ADMIN — ACETAMINOPHEN 325 MG: 325 TABLET ORAL at 22:26

## 2019-05-26 RX ADMIN — SODIUM CHLORIDE 1000 ML: 0.9 INJECTION, SOLUTION INTRAVENOUS at 21:02

## 2019-05-26 RX ADMIN — SODIUM CHLORIDE, SODIUM GLUCONATE, SODIUM ACETATE, POTASSIUM CHLORIDE, MAGNESIUM CHLORIDE, SODIUM PHOSPHATE, DIBASIC, AND POTASSIUM PHOSPHATE 75 ML/HR: .53; .5; .37; .037; .03; .012; .00082 INJECTION, SOLUTION INTRAVENOUS at 21:45

## 2019-05-26 RX ADMIN — CEFTRIAXONE SODIUM 2000 MG: 10 INJECTION, POWDER, FOR SOLUTION INTRAVENOUS at 19:55

## 2019-05-26 RX ADMIN — SODIUM CHLORIDE 1000 ML: 0.9 INJECTION, SOLUTION INTRAVENOUS at 19:03

## 2019-05-26 RX ADMIN — HEPARIN SODIUM 5000 UNITS: 5000 INJECTION INTRAVENOUS; SUBCUTANEOUS at 22:15

## 2019-05-27 LAB
ANION GAP SERPL CALCULATED.3IONS-SCNC: 10 MMOL/L (ref 4–13)
ATRIAL RATE: 81 BPM
BUN SERPL-MCNC: 33 MG/DL (ref 5–25)
CALCIUM SERPL-MCNC: 8.5 MG/DL (ref 8.3–10.1)
CHLORIDE SERPL-SCNC: 112 MMOL/L (ref 100–108)
CO2 SERPL-SCNC: 17 MMOL/L (ref 21–32)
CREAT SERPL-MCNC: 2.06 MG/DL (ref 0.6–1.3)
ERYTHROCYTE [DISTWIDTH] IN BLOOD BY AUTOMATED COUNT: 13.1 % (ref 11.6–15.1)
GFR SERPL CREATININE-BSD FRML MDRD: 21 ML/MIN/1.73SQ M
GLUCOSE SERPL-MCNC: 86 MG/DL (ref 65–140)
HCT VFR BLD AUTO: 43.6 % (ref 34.8–46.1)
HGB BLD-MCNC: 13.9 G/DL (ref 11.5–15.4)
MCH RBC QN AUTO: 32.2 PG (ref 26.8–34.3)
MCHC RBC AUTO-ENTMCNC: 31.9 G/DL (ref 31.4–37.4)
MCV RBC AUTO: 101 FL (ref 82–98)
P AXIS: 79 DEGREES
PLATELET # BLD AUTO: 146 THOUSANDS/UL (ref 149–390)
PMV BLD AUTO: 11.2 FL (ref 8.9–12.7)
POTASSIUM SERPL-SCNC: 4.4 MMOL/L (ref 3.5–5.3)
PR INTERVAL: 160 MS
QRS AXIS: 4 DEGREES
QRSD INTERVAL: 70 MS
QT INTERVAL: 346 MS
QTC INTERVAL: 401 MS
RBC # BLD AUTO: 4.32 MILLION/UL (ref 3.81–5.12)
SODIUM SERPL-SCNC: 139 MMOL/L (ref 136–145)
T WAVE AXIS: 76 DEGREES
VENTRICULAR RATE: 81 BPM
WBC # BLD AUTO: 22.74 THOUSAND/UL (ref 4.31–10.16)

## 2019-05-27 PROCEDURE — 80048 BASIC METABOLIC PNL TOTAL CA: CPT | Performed by: INTERNAL MEDICINE

## 2019-05-27 PROCEDURE — 99232 SBSQ HOSP IP/OBS MODERATE 35: CPT | Performed by: PHYSICIAN ASSISTANT

## 2019-05-27 PROCEDURE — 93010 ELECTROCARDIOGRAM REPORT: CPT | Performed by: INTERNAL MEDICINE

## 2019-05-27 PROCEDURE — 85027 COMPLETE CBC AUTOMATED: CPT | Performed by: INTERNAL MEDICINE

## 2019-05-27 RX ORDER — OXYCODONE HYDROCHLORIDE 5 MG/1
2.5 TABLET ORAL EVERY 4 HOURS PRN
Status: DISCONTINUED | OUTPATIENT
Start: 2019-05-27 | End: 2019-05-31 | Stop reason: HOSPADM

## 2019-05-27 RX ADMIN — OXYCODONE HYDROCHLORIDE 2.5 MG: 5 TABLET ORAL at 12:00

## 2019-05-27 RX ADMIN — CEFEPIME HYDROCHLORIDE 2000 MG: 2 INJECTION, POWDER, FOR SOLUTION INTRAVENOUS at 11:40

## 2019-05-27 RX ADMIN — SILVER SULFADIAZINE 1 APPLICATION: 10 CREAM TOPICAL at 08:02

## 2019-05-27 RX ADMIN — ROPINIROLE HYDROCHLORIDE 2 MG: 2 TABLET, FILM COATED ORAL at 08:02

## 2019-05-27 RX ADMIN — ACETAMINOPHEN 325 MG: 325 TABLET ORAL at 05:06

## 2019-05-27 RX ADMIN — HEPARIN SODIUM 5000 UNITS: 5000 INJECTION INTRAVENOUS; SUBCUTANEOUS at 21:38

## 2019-05-27 RX ADMIN — Medication 400 MCG: at 08:02

## 2019-05-27 RX ADMIN — MIRTAZAPINE 15 MG: 15 TABLET, FILM COATED ORAL at 21:37

## 2019-05-27 RX ADMIN — ACETAMINOPHEN 325 MG: 325 TABLET ORAL at 17:36

## 2019-05-27 RX ADMIN — Medication 1 APPLICATION: at 08:03

## 2019-05-27 RX ADMIN — SODIUM CHLORIDE, SODIUM GLUCONATE, SODIUM ACETATE, POTASSIUM CHLORIDE, MAGNESIUM CHLORIDE, SODIUM PHOSPHATE, DIBASIC, AND POTASSIUM PHOSPHATE 75 ML/HR: .53; .5; .37; .037; .03; .012; .00082 INJECTION, SOLUTION INTRAVENOUS at 11:26

## 2019-05-27 RX ADMIN — HEPARIN SODIUM 5000 UNITS: 5000 INJECTION INTRAVENOUS; SUBCUTANEOUS at 15:37

## 2019-05-27 RX ADMIN — Medication 1 APPLICATION: at 17:26

## 2019-05-27 RX ADMIN — SILVER SULFADIAZINE 1 APPLICATION: 10 CREAM TOPICAL at 17:27

## 2019-05-27 RX ADMIN — Medication 1 TABLET: at 08:22

## 2019-05-27 RX ADMIN — CALCIUM 1 TABLET: 500 TABLET ORAL at 08:02

## 2019-05-28 ENCOUNTER — APPOINTMENT (INPATIENT)
Dept: RADIOLOGY | Facility: HOSPITAL | Age: 84
DRG: 871 | End: 2019-05-28
Payer: MEDICARE

## 2019-05-28 LAB
ANION GAP SERPL CALCULATED.3IONS-SCNC: 10 MMOL/L (ref 4–13)
BACTERIA UR CULT: ABNORMAL
BASOPHILS # BLD AUTO: 0.03 THOUSANDS/ΜL (ref 0–0.1)
BASOPHILS NFR BLD AUTO: 0 % (ref 0–1)
BUN SERPL-MCNC: 32 MG/DL (ref 5–25)
CALCIUM SERPL-MCNC: 7.8 MG/DL (ref 8.3–10.1)
CHLORIDE SERPL-SCNC: 113 MMOL/L (ref 100–108)
CO2 SERPL-SCNC: 16 MMOL/L (ref 21–32)
CREAT SERPL-MCNC: 1.7 MG/DL (ref 0.6–1.3)
EOSINOPHIL # BLD AUTO: 0.03 THOUSAND/ΜL (ref 0–0.61)
EOSINOPHIL NFR BLD AUTO: 0 % (ref 0–6)
ERYTHROCYTE [DISTWIDTH] IN BLOOD BY AUTOMATED COUNT: 13.3 % (ref 11.6–15.1)
GFR SERPL CREATININE-BSD FRML MDRD: 27 ML/MIN/1.73SQ M
GLUCOSE SERPL-MCNC: 93 MG/DL (ref 65–140)
HCT VFR BLD AUTO: 37.3 % (ref 34.8–46.1)
HGB BLD-MCNC: 12.5 G/DL (ref 11.5–15.4)
IMM GRANULOCYTES # BLD AUTO: 0.12 THOUSAND/UL (ref 0–0.2)
IMM GRANULOCYTES NFR BLD AUTO: 1 % (ref 0–2)
LYMPHOCYTES # BLD AUTO: 0.42 THOUSANDS/ΜL (ref 0.6–4.47)
LYMPHOCYTES NFR BLD AUTO: 3 % (ref 14–44)
MAGNESIUM SERPL-MCNC: 2.3 MG/DL (ref 1.6–2.6)
MCH RBC QN AUTO: 32.4 PG (ref 26.8–34.3)
MCHC RBC AUTO-ENTMCNC: 33.5 G/DL (ref 31.4–37.4)
MCV RBC AUTO: 97 FL (ref 82–98)
MONOCYTES # BLD AUTO: 1.03 THOUSAND/ΜL (ref 0.17–1.22)
MONOCYTES NFR BLD AUTO: 7 % (ref 4–12)
NEUTROPHILS # BLD AUTO: 13.12 THOUSANDS/ΜL (ref 1.85–7.62)
NEUTS SEG NFR BLD AUTO: 89 % (ref 43–75)
NRBC BLD AUTO-RTO: 0 /100 WBCS
PLATELET # BLD AUTO: 132 THOUSANDS/UL (ref 149–390)
PMV BLD AUTO: 11.2 FL (ref 8.9–12.7)
POTASSIUM SERPL-SCNC: 3.4 MMOL/L (ref 3.5–5.3)
PROCALCITONIN SERPL-MCNC: 29.97 NG/ML
RBC # BLD AUTO: 3.86 MILLION/UL (ref 3.81–5.12)
SODIUM SERPL-SCNC: 139 MMOL/L (ref 136–145)
WBC # BLD AUTO: 14.75 THOUSAND/UL (ref 4.31–10.16)

## 2019-05-28 PROCEDURE — 76770 US EXAM ABDO BACK WALL COMP: CPT

## 2019-05-28 PROCEDURE — 87081 CULTURE SCREEN ONLY: CPT | Performed by: INTERNAL MEDICINE

## 2019-05-28 PROCEDURE — 80048 BASIC METABOLIC PNL TOTAL CA: CPT | Performed by: PHYSICIAN ASSISTANT

## 2019-05-28 PROCEDURE — 85025 COMPLETE CBC W/AUTO DIFF WBC: CPT | Performed by: PHYSICIAN ASSISTANT

## 2019-05-28 PROCEDURE — 99232 SBSQ HOSP IP/OBS MODERATE 35: CPT | Performed by: PHYSICIAN ASSISTANT

## 2019-05-28 PROCEDURE — 84145 PROCALCITONIN (PCT): CPT | Performed by: PHYSICIAN ASSISTANT

## 2019-05-28 PROCEDURE — 99223 1ST HOSP IP/OBS HIGH 75: CPT | Performed by: INTERNAL MEDICINE

## 2019-05-28 PROCEDURE — 83735 ASSAY OF MAGNESIUM: CPT | Performed by: PHYSICIAN ASSISTANT

## 2019-05-28 RX ORDER — POTASSIUM CHLORIDE 20 MEQ/1
40 TABLET, EXTENDED RELEASE ORAL ONCE
Status: COMPLETED | OUTPATIENT
Start: 2019-05-28 | End: 2019-05-28

## 2019-05-28 RX ADMIN — ROPINIROLE HYDROCHLORIDE 2 MG: 2 TABLET, FILM COATED ORAL at 08:57

## 2019-05-28 RX ADMIN — Medication: at 08:58

## 2019-05-28 RX ADMIN — Medication 1 TABLET: at 08:57

## 2019-05-28 RX ADMIN — OXYCODONE HYDROCHLORIDE 2.5 MG: 5 TABLET ORAL at 22:14

## 2019-05-28 RX ADMIN — HEPARIN SODIUM 5000 UNITS: 5000 INJECTION INTRAVENOUS; SUBCUTANEOUS at 22:07

## 2019-05-28 RX ADMIN — CALCIUM 1 TABLET: 500 TABLET ORAL at 08:57

## 2019-05-28 RX ADMIN — SODIUM CHLORIDE, SODIUM GLUCONATE, SODIUM ACETATE, POTASSIUM CHLORIDE, MAGNESIUM CHLORIDE, SODIUM PHOSPHATE, DIBASIC, AND POTASSIUM PHOSPHATE 75 ML/HR: .53; .5; .37; .037; .03; .012; .00082 INJECTION, SOLUTION INTRAVENOUS at 02:54

## 2019-05-28 RX ADMIN — AMPICILLIN 1000 MG: 1 INJECTION, POWDER, FOR SOLUTION INTRAMUSCULAR; INTRAVENOUS at 17:02

## 2019-05-28 RX ADMIN — DOCUSATE SODIUM 100 MG: 100 CAPSULE, LIQUID FILLED ORAL at 08:57

## 2019-05-28 RX ADMIN — Medication: at 17:02

## 2019-05-28 RX ADMIN — AMPICILLIN 1000 MG: 1 INJECTION, POWDER, FOR SOLUTION INTRAMUSCULAR; INTRAVENOUS at 23:51

## 2019-05-28 RX ADMIN — HEPARIN SODIUM 5000 UNITS: 5000 INJECTION INTRAVENOUS; SUBCUTANEOUS at 05:05

## 2019-05-28 RX ADMIN — SILVER SULFADIAZINE: 10 CREAM TOPICAL at 08:58

## 2019-05-28 RX ADMIN — POTASSIUM CHLORIDE 40 MEQ: 1500 TABLET, EXTENDED RELEASE ORAL at 06:22

## 2019-05-28 RX ADMIN — OXYCODONE HYDROCHLORIDE 2.5 MG: 5 TABLET ORAL at 17:47

## 2019-05-28 RX ADMIN — Medication 400 MCG: at 08:57

## 2019-05-28 RX ADMIN — MIRTAZAPINE 15 MG: 15 TABLET, FILM COATED ORAL at 22:06

## 2019-05-28 RX ADMIN — CEFEPIME HYDROCHLORIDE 1000 MG: 1 INJECTION, POWDER, FOR SOLUTION INTRAMUSCULAR; INTRAVENOUS at 09:23

## 2019-05-28 RX ADMIN — ACETAMINOPHEN 325 MG: 325 TABLET ORAL at 23:59

## 2019-05-28 RX ADMIN — SILVER SULFADIAZINE: 10 CREAM TOPICAL at 17:02

## 2019-05-28 RX ADMIN — HEPARIN SODIUM 5000 UNITS: 5000 INJECTION INTRAVENOUS; SUBCUTANEOUS at 14:11

## 2019-05-29 ENCOUNTER — APPOINTMENT (INPATIENT)
Dept: RADIOLOGY | Facility: HOSPITAL | Age: 84
DRG: 871 | End: 2019-05-29
Payer: MEDICARE

## 2019-05-29 LAB
ANION GAP SERPL CALCULATED.3IONS-SCNC: 13 MMOL/L (ref 4–13)
BACTERIA BLD CULT: ABNORMAL
BASOPHILS # BLD AUTO: 0.05 THOUSANDS/ΜL (ref 0–0.1)
BASOPHILS NFR BLD AUTO: 0 % (ref 0–1)
BUN SERPL-MCNC: 25 MG/DL (ref 5–25)
CALCIUM SERPL-MCNC: 8.4 MG/DL (ref 8.3–10.1)
CHLORIDE SERPL-SCNC: 114 MMOL/L (ref 100–108)
CO2 SERPL-SCNC: 16 MMOL/L (ref 21–32)
CREAT SERPL-MCNC: 1.44 MG/DL (ref 0.6–1.3)
EOSINOPHIL # BLD AUTO: 0.14 THOUSAND/ΜL (ref 0–0.61)
EOSINOPHIL NFR BLD AUTO: 1 % (ref 0–6)
ERYTHROCYTE [DISTWIDTH] IN BLOOD BY AUTOMATED COUNT: 13.4 % (ref 11.6–15.1)
GFR SERPL CREATININE-BSD FRML MDRD: 33 ML/MIN/1.73SQ M
GLUCOSE SERPL-MCNC: 95 MG/DL (ref 65–140)
GRAM STN SPEC: ABNORMAL
HCT VFR BLD AUTO: 40.2 % (ref 34.8–46.1)
HGB BLD-MCNC: 12.9 G/DL (ref 11.5–15.4)
IMM GRANULOCYTES # BLD AUTO: 0.14 THOUSAND/UL (ref 0–0.2)
IMM GRANULOCYTES NFR BLD AUTO: 1 % (ref 0–2)
LYMPHOCYTES # BLD AUTO: 0.87 THOUSANDS/ΜL (ref 0.6–4.47)
LYMPHOCYTES NFR BLD AUTO: 7 % (ref 14–44)
MCH RBC QN AUTO: 31.5 PG (ref 26.8–34.3)
MCHC RBC AUTO-ENTMCNC: 32.1 G/DL (ref 31.4–37.4)
MCV RBC AUTO: 98 FL (ref 82–98)
MONOCYTES # BLD AUTO: 1.06 THOUSAND/ΜL (ref 0.17–1.22)
MONOCYTES NFR BLD AUTO: 8 % (ref 4–12)
MRSA NOSE QL CULT: NORMAL
NEUTROPHILS # BLD AUTO: 10.38 THOUSANDS/ΜL (ref 1.85–7.62)
NEUTS SEG NFR BLD AUTO: 83 % (ref 43–75)
NRBC BLD AUTO-RTO: 0 /100 WBCS
PLATELET # BLD AUTO: 145 THOUSANDS/UL (ref 149–390)
PMV BLD AUTO: 11.1 FL (ref 8.9–12.7)
POTASSIUM SERPL-SCNC: 3.6 MMOL/L (ref 3.5–5.3)
RBC # BLD AUTO: 4.1 MILLION/UL (ref 3.81–5.12)
SODIUM SERPL-SCNC: 143 MMOL/L (ref 136–145)
WBC # BLD AUTO: 12.64 THOUSAND/UL (ref 4.31–10.16)

## 2019-05-29 PROCEDURE — 99232 SBSQ HOSP IP/OBS MODERATE 35: CPT | Performed by: INTERNAL MEDICINE

## 2019-05-29 PROCEDURE — 74176 CT ABD & PELVIS W/O CONTRAST: CPT

## 2019-05-29 PROCEDURE — 99232 SBSQ HOSP IP/OBS MODERATE 35: CPT | Performed by: PHYSICIAN ASSISTANT

## 2019-05-29 PROCEDURE — 97163 PT EVAL HIGH COMPLEX 45 MIN: CPT

## 2019-05-29 PROCEDURE — 97167 OT EVAL HIGH COMPLEX 60 MIN: CPT

## 2019-05-29 PROCEDURE — G8987 SELF CARE CURRENT STATUS: HCPCS

## 2019-05-29 PROCEDURE — 80048 BASIC METABOLIC PNL TOTAL CA: CPT | Performed by: PHYSICIAN ASSISTANT

## 2019-05-29 PROCEDURE — 85025 COMPLETE CBC W/AUTO DIFF WBC: CPT | Performed by: PHYSICIAN ASSISTANT

## 2019-05-29 PROCEDURE — 87040 BLOOD CULTURE FOR BACTERIA: CPT | Performed by: PHYSICIAN ASSISTANT

## 2019-05-29 PROCEDURE — G8988 SELF CARE GOAL STATUS: HCPCS

## 2019-05-29 RX ADMIN — OXYCODONE HYDROCHLORIDE 2.5 MG: 5 TABLET ORAL at 06:26

## 2019-05-29 RX ADMIN — CALCIUM 1 TABLET: 500 TABLET ORAL at 08:42

## 2019-05-29 RX ADMIN — AMPICILLIN 1000 MG: 1 INJECTION, POWDER, FOR SOLUTION INTRAMUSCULAR; INTRAVENOUS at 08:38

## 2019-05-29 RX ADMIN — ROPINIROLE HYDROCHLORIDE 2 MG: 2 TABLET, FILM COATED ORAL at 08:41

## 2019-05-29 RX ADMIN — DOCUSATE SODIUM 100 MG: 100 CAPSULE, LIQUID FILLED ORAL at 08:39

## 2019-05-29 RX ADMIN — MIRTAZAPINE 15 MG: 15 TABLET, FILM COATED ORAL at 23:35

## 2019-05-29 RX ADMIN — Medication 1 TABLET: at 08:40

## 2019-05-29 RX ADMIN — AMPICILLIN 1000 MG: 1 INJECTION, POWDER, FOR SOLUTION INTRAMUSCULAR; INTRAVENOUS at 16:53

## 2019-05-29 RX ADMIN — SILVER SULFADIAZINE: 10 CREAM TOPICAL at 08:41

## 2019-05-29 RX ADMIN — HEPARIN SODIUM 5000 UNITS: 5000 INJECTION INTRAVENOUS; SUBCUTANEOUS at 15:00

## 2019-05-29 RX ADMIN — HEPARIN SODIUM 5000 UNITS: 5000 INJECTION INTRAVENOUS; SUBCUTANEOUS at 05:28

## 2019-05-29 RX ADMIN — AMPICILLIN 1000 MG: 1 INJECTION, POWDER, FOR SOLUTION INTRAMUSCULAR; INTRAVENOUS at 23:37

## 2019-05-29 RX ADMIN — HEPARIN SODIUM 5000 UNITS: 5000 INJECTION INTRAVENOUS; SUBCUTANEOUS at 23:36

## 2019-05-29 RX ADMIN — Medication: at 08:41

## 2019-05-29 RX ADMIN — Medication 400 MCG: at 08:41

## 2019-05-30 ENCOUNTER — APPOINTMENT (INPATIENT)
Dept: RADIOLOGY | Facility: HOSPITAL | Age: 84
DRG: 871 | End: 2019-05-30
Payer: MEDICARE

## 2019-05-30 PROBLEM — N13.30 HYDRONEPHROSIS OF LEFT KIDNEY: Status: ACTIVE | Noted: 2019-05-30

## 2019-05-30 PROBLEM — G92.8 TOXIC METABOLIC ENCEPHALOPATHY: Status: ACTIVE | Noted: 2019-05-30

## 2019-05-30 LAB
ANION GAP SERPL CALCULATED.3IONS-SCNC: 8 MMOL/L (ref 4–13)
BACTERIA BLD CULT: ABNORMAL
BASOPHILS # BLD AUTO: 0.05 THOUSANDS/ΜL (ref 0–0.1)
BASOPHILS NFR BLD AUTO: 1 % (ref 0–1)
BUN SERPL-MCNC: 24 MG/DL (ref 5–25)
CALCIUM SERPL-MCNC: 7.9 MG/DL (ref 8.3–10.1)
CHLORIDE SERPL-SCNC: 112 MMOL/L (ref 100–108)
CO2 SERPL-SCNC: 21 MMOL/L (ref 21–32)
CREAT SERPL-MCNC: 1.4 MG/DL (ref 0.6–1.3)
EOSINOPHIL # BLD AUTO: 0.24 THOUSAND/ΜL (ref 0–0.61)
EOSINOPHIL NFR BLD AUTO: 3 % (ref 0–6)
ERYTHROCYTE [DISTWIDTH] IN BLOOD BY AUTOMATED COUNT: 13.4 % (ref 11.6–15.1)
GFR SERPL CREATININE-BSD FRML MDRD: 34 ML/MIN/1.73SQ M
GLUCOSE SERPL-MCNC: 88 MG/DL (ref 65–140)
GRAM STN SPEC: ABNORMAL
HCT VFR BLD AUTO: 38.9 % (ref 34.8–46.1)
HGB BLD-MCNC: 12.6 G/DL (ref 11.5–15.4)
IMM GRANULOCYTES # BLD AUTO: 0.04 THOUSAND/UL (ref 0–0.2)
IMM GRANULOCYTES NFR BLD AUTO: 0 % (ref 0–2)
LYMPHOCYTES # BLD AUTO: 1.01 THOUSANDS/ΜL (ref 0.6–4.47)
LYMPHOCYTES NFR BLD AUTO: 10 % (ref 14–44)
MCH RBC QN AUTO: 32 PG (ref 26.8–34.3)
MCHC RBC AUTO-ENTMCNC: 32.4 G/DL (ref 31.4–37.4)
MCV RBC AUTO: 99 FL (ref 82–98)
MONOCYTES # BLD AUTO: 1.32 THOUSAND/ΜL (ref 0.17–1.22)
MONOCYTES NFR BLD AUTO: 14 % (ref 4–12)
NEUTROPHILS # BLD AUTO: 7.03 THOUSANDS/ΜL (ref 1.85–7.62)
NEUTS SEG NFR BLD AUTO: 72 % (ref 43–75)
NRBC BLD AUTO-RTO: 0 /100 WBCS
PLATELET # BLD AUTO: 155 THOUSANDS/UL (ref 149–390)
PMV BLD AUTO: 11 FL (ref 8.9–12.7)
POTASSIUM SERPL-SCNC: 3.2 MMOL/L (ref 3.5–5.3)
RBC # BLD AUTO: 3.94 MILLION/UL (ref 3.81–5.12)
SODIUM SERPL-SCNC: 141 MMOL/L (ref 136–145)
WBC # BLD AUTO: 9.69 THOUSAND/UL (ref 4.31–10.16)

## 2019-05-30 PROCEDURE — 99232 SBSQ HOSP IP/OBS MODERATE 35: CPT | Performed by: PHYSICIAN ASSISTANT

## 2019-05-30 PROCEDURE — 78707 K FLOW/FUNCT IMAGE W/O DRUG: CPT

## 2019-05-30 PROCEDURE — 85025 COMPLETE CBC W/AUTO DIFF WBC: CPT | Performed by: PHYSICIAN ASSISTANT

## 2019-05-30 PROCEDURE — 99232 SBSQ HOSP IP/OBS MODERATE 35: CPT | Performed by: INTERNAL MEDICINE

## 2019-05-30 PROCEDURE — 99222 1ST HOSP IP/OBS MODERATE 55: CPT | Performed by: NURSE PRACTITIONER

## 2019-05-30 PROCEDURE — A9562 TC99M MERTIATIDE: HCPCS

## 2019-05-30 PROCEDURE — 80048 BASIC METABOLIC PNL TOTAL CA: CPT | Performed by: PHYSICIAN ASSISTANT

## 2019-05-30 PROCEDURE — 97535 SELF CARE MNGMENT TRAINING: CPT | Performed by: STUDENT IN AN ORGANIZED HEALTH CARE EDUCATION/TRAINING PROGRAM

## 2019-05-30 RX ORDER — LANOLIN ALCOHOL/MO/W.PET/CERES
3 CREAM (GRAM) TOPICAL ONCE
Status: COMPLETED | OUTPATIENT
Start: 2019-05-30 | End: 2019-05-30

## 2019-05-30 RX ORDER — POTASSIUM CHLORIDE 20 MEQ/1
40 TABLET, EXTENDED RELEASE ORAL ONCE
Status: COMPLETED | OUTPATIENT
Start: 2019-05-30 | End: 2019-05-30

## 2019-05-30 RX ORDER — SODIUM CHLORIDE 9 MG/ML
100 INJECTION, SOLUTION INTRAVENOUS CONTINUOUS
Status: DISCONTINUED | OUTPATIENT
Start: 2019-05-30 | End: 2019-05-31 | Stop reason: HOSPADM

## 2019-05-30 RX ADMIN — POTASSIUM CHLORIDE 40 MEQ: 1500 TABLET, EXTENDED RELEASE ORAL at 09:50

## 2019-05-30 RX ADMIN — Medication 400 MCG: at 09:50

## 2019-05-30 RX ADMIN — ROPINIROLE HYDROCHLORIDE 2 MG: 2 TABLET, FILM COATED ORAL at 09:50

## 2019-05-30 RX ADMIN — MIRTAZAPINE 15 MG: 15 TABLET, FILM COATED ORAL at 21:32

## 2019-05-30 RX ADMIN — MELATONIN 3 MG: at 22:04

## 2019-05-30 RX ADMIN — Medication 1 TABLET: at 09:50

## 2019-05-30 RX ADMIN — AMPICILLIN 1000 MG: 1 INJECTION, POWDER, FOR SOLUTION INTRAMUSCULAR; INTRAVENOUS at 23:46

## 2019-05-30 RX ADMIN — AMPICILLIN 1000 MG: 1 INJECTION, POWDER, FOR SOLUTION INTRAMUSCULAR; INTRAVENOUS at 16:52

## 2019-05-30 RX ADMIN — AMPICILLIN 1000 MG: 1 INJECTION, POWDER, FOR SOLUTION INTRAMUSCULAR; INTRAVENOUS at 09:50

## 2019-05-30 RX ADMIN — HEPARIN SODIUM 5000 UNITS: 5000 INJECTION INTRAVENOUS; SUBCUTANEOUS at 05:44

## 2019-05-30 RX ADMIN — DOCUSATE SODIUM 100 MG: 100 CAPSULE, LIQUID FILLED ORAL at 09:50

## 2019-05-30 RX ADMIN — HEPARIN SODIUM 5000 UNITS: 5000 INJECTION INTRAVENOUS; SUBCUTANEOUS at 21:32

## 2019-05-30 RX ADMIN — CALCIUM 1 TABLET: 500 TABLET ORAL at 09:50

## 2019-05-30 RX ADMIN — HEPARIN SODIUM 5000 UNITS: 5000 INJECTION INTRAVENOUS; SUBCUTANEOUS at 16:51

## 2019-05-31 ENCOUNTER — TELEPHONE (OUTPATIENT)
Dept: OTHER | Facility: HOSPITAL | Age: 84
End: 2019-05-31

## 2019-05-31 VITALS
TEMPERATURE: 98.8 F | HEART RATE: 66 BPM | BODY MASS INDEX: 16.53 KG/M2 | OXYGEN SATURATION: 94 % | WEIGHT: 96.8 LBS | SYSTOLIC BLOOD PRESSURE: 125 MMHG | RESPIRATION RATE: 18 BRPM | HEIGHT: 64 IN | DIASTOLIC BLOOD PRESSURE: 74 MMHG

## 2019-05-31 PROBLEM — N17.9 ACUTE KIDNEY INJURY SUPERIMPOSED ON CHRONIC KIDNEY DISEASE (HCC): Status: RESOLVED | Noted: 2019-05-26 | Resolved: 2019-05-31

## 2019-05-31 PROBLEM — N18.9 ACUTE KIDNEY INJURY SUPERIMPOSED ON CHRONIC KIDNEY DISEASE (HCC): Status: RESOLVED | Noted: 2019-05-26 | Resolved: 2019-05-31

## 2019-05-31 PROBLEM — G92.8 TOXIC METABOLIC ENCEPHALOPATHY: Status: RESOLVED | Noted: 2019-05-30 | Resolved: 2019-05-31

## 2019-05-31 LAB
ANION GAP SERPL CALCULATED.3IONS-SCNC: 10 MMOL/L (ref 4–13)
BUN SERPL-MCNC: 26 MG/DL (ref 5–25)
CALCIUM SERPL-MCNC: 8.3 MG/DL (ref 8.3–10.1)
CHLORIDE SERPL-SCNC: 114 MMOL/L (ref 100–108)
CO2 SERPL-SCNC: 20 MMOL/L (ref 21–32)
CREAT SERPL-MCNC: 1.28 MG/DL (ref 0.6–1.3)
GFR SERPL CREATININE-BSD FRML MDRD: 38 ML/MIN/1.73SQ M
GLUCOSE SERPL-MCNC: 93 MG/DL (ref 65–140)
POTASSIUM SERPL-SCNC: 3.5 MMOL/L (ref 3.5–5.3)
SODIUM SERPL-SCNC: 144 MMOL/L (ref 136–145)

## 2019-05-31 PROCEDURE — 80048 BASIC METABOLIC PNL TOTAL CA: CPT | Performed by: PHYSICIAN ASSISTANT

## 2019-05-31 PROCEDURE — 99232 SBSQ HOSP IP/OBS MODERATE 35: CPT | Performed by: NURSE PRACTITIONER

## 2019-05-31 PROCEDURE — 99239 HOSP IP/OBS DSCHRG MGMT >30: CPT | Performed by: PHYSICIAN ASSISTANT

## 2019-05-31 PROCEDURE — 99232 SBSQ HOSP IP/OBS MODERATE 35: CPT | Performed by: INTERNAL MEDICINE

## 2019-05-31 RX ORDER — AMOXICILLIN 500 MG/1
500 CAPSULE ORAL EVERY 8 HOURS SCHEDULED
Qty: 27 CAPSULE | Refills: 0 | Status: SHIPPED | OUTPATIENT
Start: 2019-05-31 | End: 2019-06-09

## 2019-05-31 RX ORDER — AMOXICILLIN 500 MG/1
500 CAPSULE ORAL EVERY 8 HOURS SCHEDULED
Qty: 27 CAPSULE | Refills: 0
Start: 2019-05-31 | End: 2019-05-31 | Stop reason: SDUPTHER

## 2019-05-31 RX ADMIN — ACETAMINOPHEN 325 MG: 325 TABLET ORAL at 09:08

## 2019-05-31 RX ADMIN — HEPARIN SODIUM 5000 UNITS: 5000 INJECTION INTRAVENOUS; SUBCUTANEOUS at 06:28

## 2019-05-31 RX ADMIN — DOCUSATE SODIUM 100 MG: 100 CAPSULE, LIQUID FILLED ORAL at 09:05

## 2019-05-31 RX ADMIN — Medication 400 MCG: at 09:05

## 2019-05-31 RX ADMIN — SODIUM CHLORIDE 100 ML/HR: 0.9 INJECTION, SOLUTION INTRAVENOUS at 02:51

## 2019-05-31 RX ADMIN — ROPINIROLE HYDROCHLORIDE 2 MG: 2 TABLET, FILM COATED ORAL at 09:05

## 2019-05-31 RX ADMIN — CALCIUM 1 TABLET: 500 TABLET ORAL at 09:04

## 2019-05-31 RX ADMIN — Medication 1 TABLET: at 09:05

## 2019-05-31 RX ADMIN — AMPICILLIN 1000 MG: 1 INJECTION, POWDER, FOR SOLUTION INTRAMUSCULAR; INTRAVENOUS at 09:04

## 2019-06-03 ENCOUNTER — NURSING HOME VISIT (OUTPATIENT)
Dept: GERIATRICS | Facility: OTHER | Age: 84
End: 2019-06-03
Payer: MEDICARE

## 2019-06-03 DIAGNOSIS — L89.152 PRESSURE INJURY OF SACRAL REGION, STAGE 2 (HCC): ICD-10-CM

## 2019-06-03 DIAGNOSIS — M81.0 AGE-RELATED OSTEOPOROSIS WITHOUT CURRENT PATHOLOGICAL FRACTURE: ICD-10-CM

## 2019-06-03 DIAGNOSIS — A41.9 SEPSIS SECONDARY TO UTI (HCC): Primary | ICD-10-CM

## 2019-06-03 DIAGNOSIS — N39.0 SEPSIS SECONDARY TO UTI (HCC): Primary | ICD-10-CM

## 2019-06-03 DIAGNOSIS — R26.2 AMBULATORY DYSFUNCTION: ICD-10-CM

## 2019-06-03 LAB — BACTERIA BLD CULT: NORMAL

## 2019-06-03 PROCEDURE — 99309 SBSQ NF CARE MODERATE MDM 30: CPT | Performed by: NURSE PRACTITIONER

## 2019-06-05 ENCOUNTER — NURSING HOME VISIT (OUTPATIENT)
Dept: GERIATRICS | Facility: OTHER | Age: 84
End: 2019-06-05
Payer: MEDICARE

## 2019-06-05 ENCOUNTER — TRANSITIONAL CARE MANAGEMENT (OUTPATIENT)
Dept: GERIATRICS | Facility: CLINIC | Age: 84
End: 2019-06-05

## 2019-06-05 VITALS
DIASTOLIC BLOOD PRESSURE: 63 MMHG | WEIGHT: 92.2 LBS | TEMPERATURE: 97.2 F | BODY MASS INDEX: 15.83 KG/M2 | RESPIRATION RATE: 18 BRPM | OXYGEN SATURATION: 95 % | SYSTOLIC BLOOD PRESSURE: 119 MMHG | HEART RATE: 71 BPM

## 2019-06-05 DIAGNOSIS — G31.84 MCI (MILD COGNITIVE IMPAIRMENT): Primary | ICD-10-CM

## 2019-06-05 DIAGNOSIS — E44.1 MILD PROTEIN-CALORIE MALNUTRITION (HCC): ICD-10-CM

## 2019-06-05 DIAGNOSIS — R26.2 AMBULATORY DYSFUNCTION: ICD-10-CM

## 2019-06-05 DIAGNOSIS — N39.0 URINARY TRACT INFECTION WITHOUT HEMATURIA, SITE UNSPECIFIED: ICD-10-CM

## 2019-06-05 DIAGNOSIS — L30.8 DERMATITIS ASSOCIATED WITH MOISTURE: ICD-10-CM

## 2019-06-05 PROBLEM — L98.419 SKIN ULCER OF BUTTOCK (HCC): Status: RESOLVED | Noted: 2018-10-12 | Resolved: 2019-06-05

## 2019-06-05 PROBLEM — L89.152 PRESSURE INJURY OF SACRAL REGION, STAGE 2 (HCC): Status: RESOLVED | Noted: 2019-01-02 | Resolved: 2019-06-05

## 2019-06-05 PROCEDURE — 99309 SBSQ NF CARE MODERATE MDM 30: CPT | Performed by: NURSE PRACTITIONER

## 2019-06-10 ENCOUNTER — NURSING HOME VISIT (OUTPATIENT)
Dept: GERIATRICS | Facility: OTHER | Age: 84
End: 2019-06-10
Payer: MEDICARE

## 2019-06-10 VITALS
RESPIRATION RATE: 18 BRPM | TEMPERATURE: 98 F | OXYGEN SATURATION: 96 % | DIASTOLIC BLOOD PRESSURE: 63 MMHG | SYSTOLIC BLOOD PRESSURE: 118 MMHG | BODY MASS INDEX: 15.28 KG/M2 | WEIGHT: 89 LBS | HEART RATE: 71 BPM

## 2019-06-10 DIAGNOSIS — G31.84 MCI (MILD COGNITIVE IMPAIRMENT): Primary | ICD-10-CM

## 2019-06-10 DIAGNOSIS — E44.1 MILD PROTEIN-CALORIE MALNUTRITION (HCC): ICD-10-CM

## 2019-06-10 DIAGNOSIS — N39.0 URINARY TRACT INFECTION WITHOUT HEMATURIA, SITE UNSPECIFIED: ICD-10-CM

## 2019-06-10 DIAGNOSIS — R26.2 AMBULATORY DYSFUNCTION: ICD-10-CM

## 2019-06-10 DIAGNOSIS — G25.81 RLS (RESTLESS LEGS SYNDROME): ICD-10-CM

## 2019-06-10 DIAGNOSIS — M54.2 NECK PAIN: ICD-10-CM

## 2019-06-10 PROBLEM — A41.9 SEPSIS SECONDARY TO UTI (HCC): Status: RESOLVED | Noted: 2019-05-26 | Resolved: 2019-06-10

## 2019-06-10 PROCEDURE — 99316 NF DSCHRG MGMT 30 MIN+: CPT | Performed by: NURSE PRACTITIONER

## 2019-06-11 ENCOUNTER — OFFICE VISIT (OUTPATIENT)
Dept: GERIATRICS | Facility: CLINIC | Age: 84
End: 2019-06-11
Payer: MEDICARE

## 2019-06-11 VITALS
WEIGHT: 88.4 LBS | SYSTOLIC BLOOD PRESSURE: 122 MMHG | HEART RATE: 68 BPM | OXYGEN SATURATION: 94 % | HEIGHT: 64 IN | BODY MASS INDEX: 15.09 KG/M2 | DIASTOLIC BLOOD PRESSURE: 60 MMHG

## 2019-06-11 DIAGNOSIS — E44.1 MILD PROTEIN-CALORIE MALNUTRITION (HCC): ICD-10-CM

## 2019-06-11 DIAGNOSIS — R35.0 URINARY FREQUENCY: ICD-10-CM

## 2019-06-11 DIAGNOSIS — L30.8 DERMATITIS ASSOCIATED WITH MOISTURE: ICD-10-CM

## 2019-06-11 DIAGNOSIS — R26.2 AMBULATORY DYSFUNCTION: ICD-10-CM

## 2019-06-11 DIAGNOSIS — G31.84 MCI (MILD COGNITIVE IMPAIRMENT): Primary | ICD-10-CM

## 2019-06-11 PROBLEM — N39.0 UTI (URINARY TRACT INFECTION): Status: RESOLVED | Noted: 2019-06-05 | Resolved: 2019-06-11

## 2019-06-11 PROCEDURE — 99214 OFFICE O/P EST MOD 30 MIN: CPT | Performed by: NURSE PRACTITIONER

## 2019-07-16 ENCOUNTER — OFFICE VISIT (OUTPATIENT)
Dept: GERIATRICS | Facility: CLINIC | Age: 84
End: 2019-07-16
Payer: MEDICARE

## 2019-07-16 VITALS
WEIGHT: 88 LBS | OXYGEN SATURATION: 98 % | SYSTOLIC BLOOD PRESSURE: 110 MMHG | HEIGHT: 64 IN | DIASTOLIC BLOOD PRESSURE: 50 MMHG | BODY MASS INDEX: 15.03 KG/M2 | HEART RATE: 98 BPM

## 2019-07-16 DIAGNOSIS — L30.8 DERMATITIS ASSOCIATED WITH MOISTURE: ICD-10-CM

## 2019-07-16 DIAGNOSIS — R26.2 AMBULATORY DYSFUNCTION: ICD-10-CM

## 2019-07-16 DIAGNOSIS — G31.84 MCI (MILD COGNITIVE IMPAIRMENT): Primary | ICD-10-CM

## 2019-07-16 DIAGNOSIS — E44.1 MILD PROTEIN-CALORIE MALNUTRITION (HCC): ICD-10-CM

## 2019-07-16 PROCEDURE — 99214 OFFICE O/P EST MOD 30 MIN: CPT | Performed by: NURSE PRACTITIONER

## 2019-07-16 NOTE — ASSESSMENT & PLAN NOTE
· Forgetful, but no increase in confusion or forgetfulness    · Cont to assist with adl/supportive care in pc unit

## 2019-07-16 NOTE — ASSESSMENT & PLAN NOTE
Malnutrition Findings:           BMI Findings: Body mass index is 15 11 kg/m²  Weight 90 3lb  Cont sm frequent high calorie/high protein meals and snacks    Encourage good hydration

## 2019-07-16 NOTE — PROGRESS NOTES
Assessment/Plan:    Dermatitis associated with moisture  · Current treatment of zinc cream is not working  · Allevyn peeled off skin  · Reviewed importance of avoiding moisture on skin  · Frequent tolieting, good periarea hygiene, change pads frequently  · Reviewed importance of offloading pressure  · Utilize specialized cushions  · Skin sore - note opened - reddened and peeling  · Change to schragger's paste x2 weeks then use Remedy's calazime lotion    MCI (mild cognitive impairment)  · Forgetful, but no increase in confusion or forgetfulness  · Cont to assist with adl/supportive care in pc unit      Ambulatory dysfunction  · Cont pt/ot  ·  Fall precautions    Mild protein-calorie malnutrition ([address] )  Malnutrition Findings:           BMI Findings: Body mass index is 15 11 kg/m²  Weight 90 3lb  Cont sm frequent high calorie/high protein meals and snacks  Encourage good hydration       Diagnoses and all orders for this visit:    MCI (mild cognitive impairment)    Ambulatory dysfunction    Mild protein-calorie malnutrition ([address] )    Dermatitis associated with moisture        Subjective:      Patient ID: Bridget  is a 80 y o  female  Patient presents for follow up - having hard time with buttock wound- zinc not working  Allevyn not working as it pulls skin away when removed  Pt has gel cushions at home - using  SHe tries to stay dry  Denies cp/sob/cough  No gi/gu distress  No recent falls  Doing well on PC unit      The following portions of the patient's history were reviewed and updated as appropriate: past family history, past medical history, past social history and past surgical history  Review of Systems   Constitutional: Negative for activity change, appetite change, chills and fatigue  HENT: Negative for congestion  Respiratory: Negative for cough and shortness of breath  Cardiovascular: Negative for chest pain     Gastrointestinal: Negative for abdominal pain, constipation, diarrhea, nausea and vomiting  Genitourinary: Negative for difficulty urinating  Musculoskeletal: Positive for arthralgias, back pain and gait problem  Skin: Positive for wound (bilat buttocks)  Neurological: Negative for dizziness and light-headedness  Psychiatric/Behavioral: Positive for decreased concentration (forgetful)  Objective:      /50   Pulse 98   Ht 5' 4" (1 626 m)   Wt 39 9 kg (88 lb)   SpO2 98%   BMI 15 11 kg/m²          Physical Exam   Constitutional: She is oriented to person, place, and time  She appears well-developed  No distress  thin   HENT:   Head: Normocephalic  Cardiovascular: Normal rate and normal heart sounds  Exam reveals no gallop and no friction rub  No murmur heard  Pulmonary/Chest: Effort normal and breath sounds normal  No respiratory distress  She has no wheezes  She has no rales  Abdominal: Soft  Bowel sounds are normal  She exhibits no distension  There is no tenderness  There is no rebound  Musculoskeletal: Normal range of motion  Neurological: She is alert and oriented to person, place, and time  forgetful   Skin: Skin is warm and dry  She is not diaphoretic    bilat upper buttock- inner area with area measuring approx 5cm x 3 1/2 cm (both side) - closed, erythemic and peeling skin  No drainage, no edema, tenderness, warmth  Nursing note and vitals reviewed

## 2019-07-16 NOTE — ASSESSMENT & PLAN NOTE
· Current treatment of zinc cream is not working  · Allevyn peeled off skin  · Reviewed importance of avoiding moisture on skin  · Frequent tolieting, good periarea hygiene, change pads frequently    · Reviewed importance of offloading pressure  · Utilize specialized cushions  · Skin sore - note opened - reddened and peeling  · Change to schragger's paste x2 weeks then use Remedy's calazime lotion

## 2019-07-18 ENCOUNTER — OFFICE VISIT (OUTPATIENT)
Dept: UROLOGY | Facility: AMBULATORY SURGERY CENTER | Age: 84
End: 2019-07-18
Payer: MEDICARE

## 2019-07-18 VITALS
WEIGHT: 91 LBS | BODY MASS INDEX: 15.54 KG/M2 | HEART RATE: 68 BPM | HEIGHT: 64 IN | SYSTOLIC BLOOD PRESSURE: 118 MMHG | DIASTOLIC BLOOD PRESSURE: 62 MMHG

## 2019-07-18 DIAGNOSIS — R32 URINARY INCONTINENCE, UNSPECIFIED TYPE: ICD-10-CM

## 2019-07-18 DIAGNOSIS — R31.9 URINARY TRACT INFECTION WITH HEMATURIA, SITE UNSPECIFIED: Primary | ICD-10-CM

## 2019-07-18 DIAGNOSIS — N39.0 URINARY TRACT INFECTION WITH HEMATURIA, SITE UNSPECIFIED: Primary | ICD-10-CM

## 2019-07-18 DIAGNOSIS — N13.30 HYDRONEPHROSIS, UNSPECIFIED HYDRONEPHROSIS TYPE: ICD-10-CM

## 2019-07-18 LAB — POST-VOID RESIDUAL VOLUME, ML POC: 0 ML

## 2019-07-18 PROCEDURE — 99213 OFFICE O/P EST LOW 20 MIN: CPT | Performed by: PHYSICIAN ASSISTANT

## 2019-07-18 PROCEDURE — 51798 US URINE CAPACITY MEASURE: CPT | Performed by: PHYSICIAN ASSISTANT

## 2019-07-18 NOTE — PROGRESS NOTES
UROLOGY PROGRESS NOTE   Patient Identifiers: Kristina Contreras (MRN [id number])  Date of Service: 7/18/2019    Subjective:     80-year-old female accompanied by her daughter seen in the hospital with UTI and hydronephrosis  She comes in her main complaint is incontinence  She has sores on her bottom which are not healing  I asked her to give a sample she was unable to and her bladder scan was 0  She has a history of dementia and chronic kidney disease endometrial cancer with hysterectomy  During her hospitalization she had positive urine for E coli and mild left hydronephrosis  There was no follow-up imaging      Patient has    As above      Objective:     VITALS:    Vitals:    07/18/19 1452   BP: 118/62   Pulse: 68           LABS:  Lab Results   Component Value Date    HGB 12 6 05/30/2019    HCT 38 9 05/30/2019    WBC 9 69 05/30/2019     05/30/2019   ]    Lab Results   Component Value Date     10/08/2015    K 3 5 05/31/2019     (H) 05/31/2019    CO2 20 (L) 05/31/2019    BUN 26 (H) 05/31/2019    CREATININE 1 28 05/31/2019    CALCIUM 8 3 05/31/2019    GLUCOSE 85 11/25/2017   ]        INPATIENT MEDS:    Current Outpatient Medications:     calcium carbonate (OS-CHLOÉ) 600 MG tablet, Take 600 mg by mouth daily, Disp: , Rfl:     denosumab (PROLIA) 60 mg/mL, Inject under the skin every 6 (six) months, Disp: , Rfl:     docusate sodium (COLACE) 100 mg capsule, Take 100 mg by mouth daily, Disp: , Rfl:     ferrous sulfate 325 (65 Fe) mg tablet, Take 325 mg by mouth daily at bedtime, Disp: , Rfl:     folic acid (FOLVITE) 366 mcg tablet, Take 400 mcg by mouth daily, Disp: , Rfl:     mirtazapine (REMERON) 15 mg tablet, Take 15 mg by mouth daily at bedtime, Disp: , Rfl:     Multiple Vitamin (DAILY JAMES) TABS, Take by mouth daily, Disp: , Rfl:     potassium chloride (MICRO-K) 10 MEQ CR capsule, Take 10 mEq by mouth 2 (two) times a day, Disp: , Rfl:     rOPINIRole (REQUIP) 2 mg tablet, Take 2 mg by mouth daily, Disp: , Rfl:     acetaminophen (TYLENOL) 325 mg tablet, Take two tablets by mouth every 6 hours as needed, Disp: , Rfl:     Magnesium Hydroxide (MILK OF MAGNESIA PO), Take by mouth ADMIN 30ML PO PRN, Disp: , Rfl:     Nutritional Supplements (ENSURE ACTIVE) LIQD, Take by mouth daily, Disp: , Rfl:     phenylephrine (JUNIOR-SYNEPHRINE) 1 % nasal spray, Administer as needed, Disp: , Rfl:     zinc oxide (DESITIN) 13 % cream, Apply topically daily as needed, Disp: , Rfl:       Physical Exam:   /62 (BP Location: Left arm, Patient Position: Sitting, Cuff Size: Adult)   Pulse 68   Ht 5' 4" (1 626 m)   Wt 41 3 kg (91 lb)   BMI 15 62 kg/m²   GEN: no acute distress    RESP: breathing comfortably with no accessory muscle use    ABD: soft, non-tender, non-distended   INCISION:    EXT: no significant peripheral edema       RADIOLOGY:    none     Assessment:    1  Urinary incontinence   2   Hydronephrosis     Plan:   - recommend follow-up urodynamic testing with ultrasound prior to visit  -  -  -

## 2019-07-19 ENCOUNTER — HOSPITAL ENCOUNTER (OUTPATIENT)
Dept: RADIOLOGY | Age: 84
Discharge: HOME | End: 2019-07-19
Payer: MEDICARE

## 2019-07-19 DIAGNOSIS — N13.30 HYDRONEPHROSIS, UNSPECIFIED HYDRONEPHROSIS TYPE: ICD-10-CM

## 2019-07-19 PROCEDURE — 76770 US EXAM ABDO BACK WALL COMP: CPT

## 2019-08-01 ENCOUNTER — PROCEDURE VISIT (OUTPATIENT)
Dept: UROLOGY | Facility: CLINIC | Age: 84
End: 2019-08-01
Payer: MEDICARE

## 2019-08-01 DIAGNOSIS — R39.15 URINARY URGENCY: Primary | ICD-10-CM

## 2019-08-01 DIAGNOSIS — N32.81 DETRUSOR INSTABILITY: ICD-10-CM

## 2019-08-01 DIAGNOSIS — N39.41 URGE INCONTINENCE: ICD-10-CM

## 2019-08-01 LAB
SL AMB  POCT GLUCOSE, UA: NEGATIVE
SL AMB LEUKOCYTE ESTERASE,UA: ABNORMAL
SL AMB POCT BILIRUBIN,UA: NEGATIVE
SL AMB POCT BLOOD,UA: NEGATIVE
SL AMB POCT CLARITY,UA: CLEAR
SL AMB POCT COLOR,UA: YELLOW
SL AMB POCT KETONES,UA: NEGATIVE
SL AMB POCT NITRITE,UA: NEGATIVE
SL AMB POCT PH,UA: 7
SL AMB POCT SPECIFIC GRAVITY,UA: 1
SL AMB POCT URINE PROTEIN: NEGATIVE
SL AMB POCT UROBILINOGEN: NEGATIVE

## 2019-08-01 PROCEDURE — 81002 URINALYSIS NONAUTO W/O SCOPE: CPT

## 2019-08-01 PROCEDURE — 51728 CYSTOMETROGRAM W/VP: CPT

## 2019-08-01 PROCEDURE — 87086 URINE CULTURE/COLONY COUNT: CPT | Performed by: UROLOGY

## 2019-08-01 PROCEDURE — 51797 INTRAABDOMINAL PRESSURE TEST: CPT | Performed by: UROLOGY

## 2019-08-01 PROCEDURE — 51784 ANAL/URINARY MUSCLE STUDY: CPT | Performed by: UROLOGY

## 2019-08-01 NOTE — PROGRESS NOTES
CC: "I'm wet all the time"    Unable to void, 80 cc in bladder    CMG:       Position:  sitting           First urge:          91 ml,     pdet 40 cm of H2O          Normal urge:    91 ml,     pdet 40 cm of H2O           Must urge:         91 ml,     pdet 40 cm of H2O         Capacity:           100 ml,     pdet 27 cm of H2O         Max pdet during void     29cm H2O       Voided volume:   44  ml       Bladder stability:  unstable at 91 ml with  leakage       Compliance:  normal        EMG activity:       Normal during filling,        normal during voiding  Comments:   Sensory urgency, + DI with large volume leak on table  Repositioned to commode and fill restarted and after 22cc again had DI with leak and void  Fill restarted and at 94cc again had DI with leak and void and voided catheter out       Cipro 500mg given post test    Urodynamics  Date/Time: 8/1/2019 3:40 PM  Performed by: Nancy Roach RN  Authorized by: Ike Whelan MD   Procedure - Urodynamics:  Procedure details: CMG      Voiding Pressure Study: Yes      Post-procedure:     Patient tolerance: Patient tolerated procedure well with no immediate complications

## 2019-08-02 LAB — BACTERIA UR CULT: NORMAL

## 2019-08-16 ENCOUNTER — OFFICE VISIT (OUTPATIENT)
Dept: UROLOGY | Facility: CLINIC | Age: 84
End: 2019-08-16
Payer: MEDICARE

## 2019-08-16 VITALS — HEART RATE: 60 BPM | DIASTOLIC BLOOD PRESSURE: 60 MMHG | SYSTOLIC BLOOD PRESSURE: 102 MMHG

## 2019-08-16 DIAGNOSIS — N32.81 OAB (OVERACTIVE BLADDER): Primary | ICD-10-CM

## 2019-08-16 PROCEDURE — 99213 OFFICE O/P EST LOW 20 MIN: CPT | Performed by: UROLOGY

## 2019-08-16 PROCEDURE — 1124F ACP DISCUSS-NO DSCNMKR DOCD: CPT | Performed by: UROLOGY

## 2019-08-16 RX ORDER — SOLIFENACIN SUCCINATE 5 MG/1
5 TABLET, FILM COATED ORAL DAILY
Qty: 30 TABLET | Refills: 6 | Status: SHIPPED | OUTPATIENT
Start: 2019-08-16 | End: 2019-11-12 | Stop reason: ALTCHOICE

## 2019-08-16 NOTE — PROGRESS NOTES
Progress Note - Urology  Shellie Claude 80 y o  female MRN: [id number]  Encounter: [id number]           HPI:  51-year-old female seen for follow-up evaluation of urinary incontinence  This is both urgency and passive type leakage  Cystometric study show significant detrusor instability beginning at 90 mL  Bladder pressures were adequate  Postvoid residuals are acceptable  We discussed the findings of the urodynamic study at length  Her daughter was present for the discussion  The patient does have some mild cognitive dysfunction  She has no dysuria        MEDS:    Current Outpatient Medications:     acetaminophen (TYLENOL) 325 mg tablet, Take two tablets by mouth every 6 hours as needed, Disp: , Rfl:     calcium carbonate (OS-CHLOÉ) 600 MG tablet, Take 600 mg by mouth daily, Disp: , Rfl:     denosumab (PROLIA) 60 mg/mL, Inject under the skin every 6 (six) months, Disp: , Rfl:     docusate sodium (COLACE) 100 mg capsule, Take 100 mg by mouth daily, Disp: , Rfl:     ferrous sulfate 325 (65 Fe) mg tablet, Take 325 mg by mouth daily at bedtime, Disp: , Rfl:     folic acid (FOLVITE) 317 mcg tablet, Take 400 mcg by mouth daily, Disp: , Rfl:     Magnesium Hydroxide (MILK OF MAGNESIA PO), Take by mouth ADMIN 30ML PO PRN, Disp: , Rfl:     mirtazapine (REMERON) 15 mg tablet, Take 15 mg by mouth daily at bedtime, Disp: , Rfl:     Multiple Vitamin (DAILY JAMES) TABS, Take by mouth daily, Disp: , Rfl:     Nutritional Supplements (ENSURE ACTIVE) LIQD, Take by mouth daily, Disp: , Rfl:     phenylephrine (JUNIOR-SYNEPHRINE) 1 % nasal spray, Administer as needed, Disp: , Rfl:     potassium chloride (MICRO-K) 10 MEQ CR capsule, Take 10 mEq by mouth 2 (two) times a day, Disp: , Rfl:     rOPINIRole (REQUIP) 2 mg tablet, Take 2 mg by mouth daily, Disp: , Rfl:     zinc oxide (DESITIN) 13 % cream, Apply topically daily as needed, Disp: , Rfl:     solifenacin (VESICARE) 5 mg tablet, Take 1 tablet (5 mg total) by mouth daily for 30 days, Disp: 30 tablet, Rfl: 6      VITALS  Blood pressure 102/60, pulse 60  Physical Exam:     No clinical examination was performed at this time  The appointment was spent in review of the findings and options of management  Counseling was given regarding voiding patterns on a 2 hour basis  Approximately  20 minutes time was involved            IMPRESSION:   1  Urinary incontinence   2  Detrusor instability / overactive bladder    PLAN:   I will start her on low-dose VESIcare 5 mg daily  I have also advised restriction of caffeine  I have also advised a voiding pattern every 2 hours    Finally, I did stress that should she have the feeling of need to void that she should not try to hold it at that point since this has not been successful in the past   I do not believe behavior modification will be of benefit under her circumstances

## 2019-08-20 NOTE — PROGRESS NOTES
Advised to discontinue the VESIcare as the drug effect may reduce gastric emptying when use with potassium can increase the risk for stomach erosion    Will not renew any of this type medication as the effect may be seen across this class of drugs

## 2019-08-28 ENCOUNTER — OFFICE VISIT (OUTPATIENT)
Dept: GERIATRICS | Facility: CLINIC | Age: 84
End: 2019-08-28
Payer: MEDICARE

## 2019-08-28 DIAGNOSIS — M81.0 AGE-RELATED OSTEOPOROSIS WITHOUT CURRENT PATHOLOGICAL FRACTURE: Primary | ICD-10-CM

## 2019-08-28 PROCEDURE — 96372 THER/PROPH/DIAG INJ SC/IM: CPT | Performed by: INTERNAL MEDICINE

## 2019-08-28 NOTE — PROGRESS NOTES
[address] [place] [address] Procedure Note      NAME: Mabel Guerrero  AGE: 80 y o  SEX: female [id number]    DATE OF ENCOUNTER: 8/28/2019    Mabel Guerrero is here for subcutaneous injection of Prolia 60mg as ordered by Dr Allyn Monzon for Osteoporosis  Verbal consent was obtained and subcutaneous injection was given into the R upper arm  The site was prepped with alcohol using proper aseptic technique and covered with a band aid if needed after injection  Mabel Guerrero tolerated the injection well  Explained side effects from Prolia may include Injection site soreness  Ms Christopher Morataya encouraged to notify us if she experiences any adverse effects  Kit Purdypaige will need next Prolia injection 2/28/19

## 2019-11-12 ENCOUNTER — OFFICE VISIT (OUTPATIENT)
Dept: GERIATRICS | Facility: CLINIC | Age: 84
End: 2019-11-12
Payer: MEDICARE

## 2019-11-12 VITALS
OXYGEN SATURATION: 96 % | DIASTOLIC BLOOD PRESSURE: 58 MMHG | BODY MASS INDEX: 15.5 KG/M2 | SYSTOLIC BLOOD PRESSURE: 110 MMHG | HEART RATE: 68 BPM | WEIGHT: 90.8 LBS | HEIGHT: 64 IN

## 2019-11-12 DIAGNOSIS — L30.8 DERMATITIS ASSOCIATED WITH MOISTURE: Primary | ICD-10-CM

## 2019-11-12 DIAGNOSIS — L98.411 SKIN ULCER OF BUTTOCK, LIMITED TO BREAKDOWN OF SKIN (HCC): ICD-10-CM

## 2019-11-12 DIAGNOSIS — N32.81 OAB (OVERACTIVE BLADDER): ICD-10-CM

## 2019-11-12 DIAGNOSIS — R26.2 AMBULATORY DYSFUNCTION: ICD-10-CM

## 2019-11-12 PROBLEM — M79.671 HEEL PAIN, BILATERAL: Status: ACTIVE | Noted: 2019-11-12

## 2019-11-12 PROBLEM — M79.672 HEEL PAIN, BILATERAL: Status: ACTIVE | Noted: 2019-11-12

## 2019-11-12 PROCEDURE — 99214 OFFICE O/P EST MOD 30 MIN: CPT | Performed by: NURSE PRACTITIONER

## 2019-11-12 RX ORDER — GABAPENTIN 100 MG/1
100 CAPSULE ORAL
COMMUNITY

## 2019-11-12 NOTE — ASSESSMENT & PLAN NOTE
· B/L heel pain with taking shoes on and off, none with ambulation  · Gabapentin 100mg po qHS trial   · Monitor for CNS side effects

## 2019-11-12 NOTE — ASSESSMENT & PLAN NOTE
· Calazime cream TID to patient's sacral area  · Turn and reposition frequently  · Keep betty area dry, change incontinence pads frequently  · Adequate hydration and nutrition

## 2019-11-12 NOTE — PATIENT INSTRUCTIONS
· Gabapentin 100mg po qHS trial for heel pain  · Continue PT/OT for gait and strength training  · Calazime cream TID to sacral moisture associated dermatitis and pressure ulcer  · Off load pressure from sacral area  · Updated daughter Win Loyola with plan of care  · Follow up for DME in 2 weeks

## 2019-11-12 NOTE — PROGRESS NOTES
Assessment/Plan:    Ambulatory dysfunction  · Continue PT/OT  · Fall Precautions  · Encourage adequate hydration/nutrition/sleep hygiene    Dermatitis associated with moisture  · Calazime cream TID to patient's sacral area  · Turn and reposition frequently  · Keep betty area dry, change incontinence pads frequently  · Adequate hydration and nutrition    Skin ulcer of buttock (HCC)  · Calazime cream TID  · Measure stage 2 left buttock wound once a week  · See moisture associated dermatitis instructions    Heel pain, bilateral  · B/L heel pain with taking shoes on and off, none with ambulation  · Gabapentin 100mg po qHS trial   · Monitor for CNS side effects       There are no diagnoses linked to this encounter  Subjective:      Patient ID: Mitch Mendez is a 80 y o  female  Ms Edgardo Roman is an 81yo female presenting to the office s/p fall x2 a few days ago  Patient reports b/l heel pain, otherwise denies generalized pain/Cp/SOB/GI/ issues  Patient denies hitting her head with either fall nor LOC  Patient states heel pain is present when taking shoes on and off, otherwise is absent with ambulation  PMH:  RLS, CKD 3, Osteoporosis, OA, OAB, MCI, Ambulatory dysfunction      The following portions of the patient's history were reviewed and updated as appropriate: past family history, past medical history, past social history and past surgical history  Review of Systems   Constitutional: Negative  HENT: Negative  Eyes: Negative  Respiratory: Negative  Gastrointestinal: Negative  Endocrine: Negative  Genitourinary: Negative  Neurological:        B/L heel pain   Psychiatric/Behavioral: Positive for decreased concentration  Objective:      /58   Pulse 68   Ht 5' 4" (1 626 m)   Wt 41 2 kg (90 lb 12 8 oz)   SpO2 96%   BMI 15 59 kg/m²          Physical Exam   Constitutional: Vital signs are normal  She appears cachectic     Musculoskeletal:        Right foot: There is tenderness  Left foot: There is tenderness  Neurological: She is alert  She displays atrophy  Skin: Skin is warm  Rash noted  Psychiatric: She has a normal mood and affect  Cognition and memory are impaired

## 2019-11-12 NOTE — ASSESSMENT & PLAN NOTE
· Calazime cream TID  · Measure stage 2 left buttock wound once a week  · See moisture associated dermatitis instructions

## 2019-11-26 ENCOUNTER — OFFICE VISIT (OUTPATIENT)
Dept: GERIATRICS | Facility: CLINIC | Age: 84
End: 2019-11-26
Payer: MEDICARE

## 2019-11-26 VITALS
TEMPERATURE: 98.6 F | BODY MASS INDEX: 15.88 KG/M2 | SYSTOLIC BLOOD PRESSURE: 100 MMHG | HEIGHT: 64 IN | WEIGHT: 93 LBS | OXYGEN SATURATION: 90 % | HEART RATE: 76 BPM | DIASTOLIC BLOOD PRESSURE: 64 MMHG

## 2019-11-26 DIAGNOSIS — L98.411 SKIN ULCER OF BUTTOCK, LIMITED TO BREAKDOWN OF SKIN (HCC): Primary | ICD-10-CM

## 2019-11-26 DIAGNOSIS — R26.2 AMBULATORY DYSFUNCTION: ICD-10-CM

## 2019-11-26 DIAGNOSIS — R41.89 COGNITIVE IMPAIRMENT: ICD-10-CM

## 2019-11-26 DIAGNOSIS — M79.672 HEEL PAIN, BILATERAL: ICD-10-CM

## 2019-11-26 DIAGNOSIS — M79.671 HEEL PAIN, BILATERAL: ICD-10-CM

## 2019-11-26 PROBLEM — R63.4 WEIGHT LOSS: Status: RESOLVED | Noted: 2018-10-29 | Resolved: 2019-11-26

## 2019-11-26 PROBLEM — R35.0 URINARY FREQUENCY: Status: RESOLVED | Noted: 2019-06-11 | Resolved: 2019-11-26

## 2019-11-26 PROBLEM — T18.9XXA FOREIGN BODY IN DIGESTIVE TRACT: Status: RESOLVED | Noted: 2018-10-09 | Resolved: 2019-11-26

## 2019-11-26 PROBLEM — M79.89 SWELLING OF LEFT HAND: Status: RESOLVED | Noted: 2018-02-12 | Resolved: 2019-11-26

## 2019-11-26 PROBLEM — N13.30 HYDRONEPHROSIS OF LEFT KIDNEY: Status: RESOLVED | Noted: 2019-05-30 | Resolved: 2019-11-26

## 2019-11-26 PROBLEM — R41.3 SHORT-TERM MEMORY LOSS: Status: RESOLVED | Noted: 2017-02-28 | Resolved: 2019-11-26

## 2019-11-26 PROCEDURE — 99213 OFFICE O/P EST LOW 20 MIN: CPT | Performed by: NURSE PRACTITIONER

## 2019-11-26 NOTE — ASSESSMENT & PLAN NOTE
· Patient stable on PC level of care  · DME form completed for annual check  · Continue adequate hydration/nutrition  · Ensure good sleep hygiene/socialization

## 2019-11-26 NOTE — PROGRESS NOTES
Assessment/Plan:    Heel pain, bilateral  · Improved dramatically since gabapentin initiated  · Continue current treatment      Cognitive impairment  · Patient stable on  level of care  · DME form completed for annual check  · Continue adequate hydration/nutrition  · Ensure good sleep hygiene/socialization      Ambulatory dysfunction  · Steady gait with roller walker  · Continue fall precautions    Skin ulcer of buttock (HCC)  · Ulcer and MAD much improved since last assessment  · Continue Calazime to sacral area TID  · Keep area clean and dry, reposition frequently  · Ensure adequate hydration/nutrition       Diagnoses and all orders for this visit:    Skin ulcer of buttock, limited to breakdown of skin ([address] )    Ambulatory dysfunction    Cognitive impairment    Heel pain, bilateral        Subjective:      Patient ID: Hoa Toussaint is a 80 y o  female  Mrs Kaufman Brochure was seen today in the office for follow up of her b/l heel pain and moisture associated dermatitis and ulcer of the sacrum  Patient reports improved pain of heels and that she has been getting the Calazime on her sacrum  Patient denies CP/SOB/Palpitations/ issues  Patient does report persistent intermittent diarrhea but stopped taking the stool softeners  DME to be completed today      The following portions of the patient's history were reviewed and updated as appropriate: past family history, past medical history, past social history and past surgical history  Review of Systems   Constitutional: Negative for activity change, appetite change, chills and fatigue  HENT: Negative for congestion  Respiratory: Negative for cough and shortness of breath  Cardiovascular: Negative for chest pain  Gastrointestinal: Positive for diarrhea  Negative for abdominal pain, constipation, nausea and vomiting  Genitourinary: Negative for difficulty urinating  Musculoskeletal: Positive for arthralgias, back pain and gait problem  Neurological: Negative for dizziness and light-headedness  Psychiatric/Behavioral: Positive for decreased concentration (forgetful)  Objective:      /64   Pulse 76   Temp 98 6 °F (37 °C) (Temporal)   Ht 5' 4" (1 626 m)   Wt 42 2 kg (93 lb)   SpO2 90%   BMI 15 96 kg/m²          Physical Exam   Constitutional: She is oriented to person, place, and time  She appears well-developed and well-nourished  No distress  thin   HENT:   Head: Normocephalic  Cardiovascular: Normal rate and normal heart sounds  Exam reveals no gallop and no friction rub  No murmur heard  Pulmonary/Chest: Effort normal and breath sounds normal  No respiratory distress  She has no wheezes  She has no rales  Abdominal: Soft  Bowel sounds are normal  She exhibits no distension  There is no tenderness  There is no rebound  Musculoskeletal: Normal range of motion  Neurological: She is alert and oriented to person, place, and time  forgetful   Skin: Skin is warm and dry  She is not diaphoretic  Psychiatric: She has a normal mood and affect  Her behavior is normal    Nursing note and vitals reviewed

## 2019-11-26 NOTE — ASSESSMENT & PLAN NOTE
· Ulcer and MAD much improved since last assessment  · Continue Calazime to sacral area TID  · Keep area clean and dry, reposition frequently  · Ensure adequate hydration/nutrition

## 2020-02-07 ENCOUNTER — OFFICE VISIT (OUTPATIENT)
Dept: GERIATRICS | Facility: CLINIC | Age: 85
End: 2020-02-07
Payer: MEDICARE

## 2020-02-07 VITALS
OXYGEN SATURATION: 95 % | DIASTOLIC BLOOD PRESSURE: 70 MMHG | HEART RATE: 62 BPM | HEIGHT: 64 IN | BODY MASS INDEX: 15.71 KG/M2 | SYSTOLIC BLOOD PRESSURE: 132 MMHG | WEIGHT: 92 LBS

## 2020-02-07 DIAGNOSIS — R41.89 COGNITIVE IMPAIRMENT: ICD-10-CM

## 2020-02-07 DIAGNOSIS — E03.8 OTHER SPECIFIED HYPOTHYROIDISM: Primary | ICD-10-CM

## 2020-02-07 DIAGNOSIS — N39.46 MIXED INCONTINENCE: ICD-10-CM

## 2020-02-07 DIAGNOSIS — M79.671 HEEL PAIN, BILATERAL: ICD-10-CM

## 2020-02-07 DIAGNOSIS — D50.9 IRON DEFICIENCY ANEMIA, UNSPECIFIED IRON DEFICIENCY ANEMIA TYPE: ICD-10-CM

## 2020-02-07 DIAGNOSIS — F32.1 CURRENT MODERATE EPISODE OF MAJOR DEPRESSIVE DISORDER WITHOUT PRIOR EPISODE (HCC): ICD-10-CM

## 2020-02-07 DIAGNOSIS — G25.81 RLS (RESTLESS LEGS SYNDROME): ICD-10-CM

## 2020-02-07 DIAGNOSIS — M79.672 HEEL PAIN, BILATERAL: ICD-10-CM

## 2020-02-07 DIAGNOSIS — R26.2 AMBULATORY DYSFUNCTION: ICD-10-CM

## 2020-02-07 DIAGNOSIS — R19.7 DIARRHEA, UNSPECIFIED TYPE: ICD-10-CM

## 2020-02-07 PROCEDURE — 99215 OFFICE O/P EST HI 40 MIN: CPT | Performed by: INTERNAL MEDICINE

## 2020-02-07 NOTE — ASSESSMENT & PLAN NOTE
Patient complains of diarrhea for a few months time  She is afebrile  Denies pain/n/v  Reports stool is thin, but not watery  She denies blood in stool  Denies malodor    - CBC w/ differential  - CMP  - stool chart  Follow up in one week

## 2020-02-07 NOTE — ASSESSMENT & PLAN NOTE
Pt fully oriented today  Her daughter and nurse report worsening cognition over the last few weeks  Decrease ropinirole to 1mg daily to address concerns  Follow up in one week

## 2020-02-07 NOTE — PROGRESS NOTES
[facility] Progress Note    NAME: Sydnee Fernandez  AGE: 80 y o  SEX: female [id number]    DATE OF ENCOUNTER: 2/7/2020    Assessment and Plan     Problem List Items Addressed This Visit        Endocrine    Hypothyroidism - Primary     Check TSH due to hx of diarrhea  Other    Ambulatory dysfunction     Patient is active and ambulates with rolling walker  The walker has been sticking on the R side  - continue work with PT twice weekly  - please assess status of rolling walker         Anemia     Patient denies sxs of anemia   - Continue Iron pills daily  - CBC w/ differential         Cognitive impairment     Pt fully oriented today  Her daughter and nurse report worsening cognition over the last few weeks  Decrease ropinirole to 1mg daily to address concerns  Follow up in one week  Depression     Mood and affect good today  Pt's appetite is improving    - decrease mirtazapine to 7 5mg po at bedtime          Diarrhea     Patient complains of diarrhea for a few months time  She is afebrile  Denies pain/n/v  Reports stool is thin, but not watery  She denies blood in stool  Denies malodor    - CBC w/ differential  - CMP  - stool chart  Follow up in one week  Mixed incontinence    RLS (restless legs syndrome)     Pt reports no sxs of RLS at this time with use of ropinirole  - decrease ropinirole to 1mg daily due to possible side effects on cognition  Heel pain, bilateral     Pain is improved with use of gabapentin daily and pillows under her feet at night  All medications and routine orders were reviewed and updated as needed  Plan discussed with: Patient, her daughter Leah Ryan, Pt's nurse    Chief Complaint     Chief Complaint   Patient presents with    Memory Loss     daughter thinks mother has had multiple TIAs        History of Present Illness     Ms Tray Menjivar is a 81yo F here with a past medical history of restless leg syndrome, decreased appetite, iron deficiency anemia, urinary incontinence, diarrhea, and dementia, who is here today for routine follow up visit  She is currently living in the personal care facility  Pt reports that her appetite has been good  She is no longer having pain in her heels at night with the use of gabapentin  Patient reports that she is no longer having symptoms of restless leg syndrome and feels that she does not need ropinirole  Ms Petty Solomon daughter, Urvashi Rhodes, was on the phone during the visit and reported that her mother has appeared disoriented at times recently  She says that this has neema going on "for a while"  She reported that her mother will "lose days" and needs a schedule to stay oriented  She said that at times she will visit her mother and her mother will appear confused  Pt's daughter reports that there has been no change in strength or ambulation  She has noticed cognitive changes only  Patient appeared to have some difficulty with memory throughout the conversation, periodically struggling with word finding  She is oriented to self and year  She does not know the date of her birthday, but does know the president  She is getting out and meeting with people for meals  She enjoys reading and writing still  Patient reports that she has been having diarrhea, reports that she has stopped taking stool softerners  She reports diarrhea is non-painful  She denies particular malodor, and denies susan blood  She has had stool incontinence at least once weekly, confirmed with nursing staff  She continues to have urinary incontinence and uses multiple pads at a time which she changes throughout the day  Report from the nursing staff indicated that patient has been doing well  She is ambulatory and walks frequently  She has had some falls in the past few months, but has not had a fall since beginning use of rolling walker  Nurse, Ye Jones, reports that the patient has been more forgetful   Her appetite has increased since beginning remeron at night  Medications were reviewed with the patient  She is not taking any anticholinergic medications that might exacerbate memory impairment  The plan was reviewed with the patient and her daughter who are amenable to the changes  HISTORY:  Past Surgical History:   Procedure Laterality Date    HYSTERECTOMY        Past Medical History:   Diagnosis Date    Closed displaced fracture of acromial end of left clavicle with routine healing     Disease of thyroid gland     Endometrial cancer ([address] )     Hip pain, left     History of rib fracture     MCI (mild cognitive impairment)     Osteoarthritis of hand     Urinary frequency     Urinary incontinence      Family History   Problem Relation Age of Onset    Breast cancer Mother     Diabetes Mother     Multiple sclerosis Father      Social History     Socioeconomic History    Marital status:      Spouse name: None    Number of children: None    Years of education: None    Highest education level: None   Occupational History    Occupation: retired   Social Needs    Financial resource strain: None    Food insecurity:     Worry: None     Inability: None    Transportation needs:     Medical: None     Non-medical: None   Tobacco Use    Smoking status: Current Every Day Smoker     Packs/day: 0 20     Types: Cigarettes    Smokeless tobacco: Never Used   Substance and Sexual Activity    Alcohol use:  Yes     Alcohol/week: 7 0 standard drinks     Types: 7 Shots of liquor per week    Drug use: No    Sexual activity: None   Lifestyle    Physical activity:     Days per week: None     Minutes per session: None    Stress: None   Relationships    Social connections:     Talks on phone: None     Gets together: None     Attends [employer]ism service: None     Active member of club or organization: None     Attends meetings of clubs or organizations: None     Relationship status: None    Intimate partner violence:     Fear of current or ex partner: None     Emotionally abused: None     Physically abused: None     Forced sexual activity: None   Other Topics Concern    None   Social History Narrative    None       Allergies: Allergies   Allergen Reactions    Megestrol      Annotation - [id number]: Tasted bad  Refuses to take   Omeprazole Diarrhea     Annotation - [id number]: 08/30/16 Violet(daughter) report that patient experience diarrhea while on meds       Review of Systems     Review of Systems   Constitutional: Negative for activity change, appetite change and unexpected weight change  Gastrointestinal: Positive for diarrhea  Negative for blood in stool  Genitourinary: Negative for difficulty urinating         PHQ-9 Depression Screening    PHQ-9:    Frequency of the following problems over the past two weeks:              Medications and orders       Current Outpatient Medications:     acetaminophen (TYLENOL) 325 mg tablet, Take two tablets by mouth every 6 hours as needed, Disp: , Rfl:     calcium carbonate (OS-CHLOÉ) 600 MG tablet, Take 600 mg by mouth daily, Disp: , Rfl:     denosumab (PROLIA) 60 mg/mL, Inject under the skin every 6 (six) months, Disp: , Rfl:     ferrous sulfate 325 (65 Fe) mg tablet, Take 325 mg by mouth daily at bedtime, Disp: , Rfl:     folic acid (FOLVITE) 642 mcg tablet, Take 400 mcg by mouth daily, Disp: , Rfl:     gabapentin (NEURONTIN) 100 mg capsule, Take 100 mg by mouth daily at bedtime, Disp: , Rfl:     mirtazapine (REMERON) 15 mg tablet, Take 15 mg by mouth daily at bedtime, Disp: , Rfl:     Multiple Vitamin (DAILY JAMES) TABS, Take by mouth daily, Disp: , Rfl:     Nutritional Supplements (ENSURE ACTIVE) LIQD, Take by mouth daily, Disp: , Rfl:     phenylephrine (JUNIOR-SYNEPHRINE) 1 % nasal spray, Administer as needed, Disp: , Rfl:     potassium chloride (MICRO-K) 10 MEQ CR capsule, Take 10 mEq by mouth 2 (two) times a day, Disp: , Rfl:     rOPINIRole (REQUIP) 2 mg tablet, Take 2 mg by mouth daily, Disp: , Rfl:        Objective     Vitals:   Vitals:    02/07/20 1057   BP: 132/70   Pulse: 62   SpO2: 95%   Weight: 41 7 kg (92 lb)   Height: 5' 4" (1 626 m)       Physical Exam   Constitutional: She is oriented to person, place, and time  She appears well-developed and well-nourished  No distress  HENT:   Head: Normocephalic  Right Ear: External ear normal    Left Ear: External ear normal    Mouth/Throat: Oropharynx is clear and moist  No oropharyngeal exudate  Eyes: Pupils are equal, round, and reactive to light  Conjunctivae are normal    Cardiovascular: Normal rate, regular rhythm, normal heart sounds and intact distal pulses  Exam reveals no gallop and no friction rub  No murmur heard  Pulmonary/Chest: Effort normal and breath sounds normal    Abdominal: Soft  Bowel sounds are normal  She exhibits no distension  There is no tenderness  Musculoskeletal: She exhibits no edema, tenderness or deformity  Neurological: She is alert and oriented to person, place, and time  Skin: Skin is warm and dry  Psychiatric: She has a normal mood and affect  Her behavior is normal        Pertinent Laboratory/Diagnostic Studies: The following labs/studies were reviewed please see facility chart for details

## 2020-02-07 NOTE — ASSESSMENT & PLAN NOTE
Mood and affect good today   Pt's appetite is improving    - decrease mirtazapine to 7 5mg po at bedtime

## 2020-02-07 NOTE — ASSESSMENT & PLAN NOTE
Patient is active and ambulates with rolling walker  The walker has been sticking on the R side  - continue work with PT twice weekly     - please assess status of rolling walker

## 2020-02-07 NOTE — PATIENT INSTRUCTIONS
1  Stool chart daily  2  CMP  3  CBC with differential  4  Check TSH level  5  PT evaluation to evaluate rolling walker  6  Decrease ropinirole to 1mg po daily  7  Decrease mirtazipine to 7 5mg po at bedtime     8  Follow up in one week

## 2020-02-07 NOTE — ASSESSMENT & PLAN NOTE
Pt reports no sxs of RLS at this time with use of ropinirole  - decrease ropinirole to 1mg daily due to possible side effects on cognition

## 2020-02-14 ENCOUNTER — OFFICE VISIT (OUTPATIENT)
Dept: GERIATRICS | Facility: CLINIC | Age: 85
End: 2020-02-14
Payer: MEDICARE

## 2020-02-14 VITALS
HEIGHT: 64 IN | BODY MASS INDEX: 16.08 KG/M2 | SYSTOLIC BLOOD PRESSURE: 128 MMHG | WEIGHT: 94.2 LBS | DIASTOLIC BLOOD PRESSURE: 80 MMHG | HEART RATE: 68 BPM | OXYGEN SATURATION: 94 %

## 2020-02-14 DIAGNOSIS — M79.672 HEEL PAIN, BILATERAL: ICD-10-CM

## 2020-02-14 DIAGNOSIS — N18.30 CHRONIC KIDNEY DISEASE, STAGE III (MODERATE) (HCC): ICD-10-CM

## 2020-02-14 DIAGNOSIS — M79.671 HEEL PAIN, BILATERAL: ICD-10-CM

## 2020-02-14 DIAGNOSIS — R26.2 AMBULATORY DYSFUNCTION: ICD-10-CM

## 2020-02-14 DIAGNOSIS — R41.89 COGNITIVE IMPAIRMENT: ICD-10-CM

## 2020-02-14 DIAGNOSIS — E03.8 OTHER SPECIFIED HYPOTHYROIDISM: Primary | ICD-10-CM

## 2020-02-14 DIAGNOSIS — R19.7 DIARRHEA, UNSPECIFIED TYPE: ICD-10-CM

## 2020-02-14 PROCEDURE — 99214 OFFICE O/P EST MOD 30 MIN: CPT | Performed by: INTERNAL MEDICINE

## 2020-02-14 NOTE — ASSESSMENT & PLAN NOTE
Patient's TSH is 5 44 will continue to monitor her thyroid studies will order another 1 in 3 months time

## 2020-02-14 NOTE — PATIENT INSTRUCTIONS
1 -patient will have her walker fixed  2  -will repeat a TSH in 3 months      3 -follow-up patient in 3 months time

## 2020-02-14 NOTE — PROGRESS NOTES
[address] [place] [address] Progress Note    NAME: Sergey Bui  AGE: 80 y o  SEX: female [id number]    DATE OF ENCOUNTER: 2/14/2020    Assessment and Plan     Problem List Items Addressed This Visit        Endocrine    Hypothyroidism - Primary     Patient's TSH is 5 44 will continue to monitor her thyroid studies will order another 1 in 3 months time            Genitourinary    Chronic kidney disease, stage III (moderate) (HCC)     Patient with chronic renal failure stage 3 her creatinine is running at 1 50 with a GFR of 31  Other    Ambulatory dysfunction     Patient uses a 4 wheeled walker  The break on the right side appears to be faulty will have staff looking to it since this poses a risk for falls  Cognitive impairment     Stable at present         Diarrhea     This has improved according to patient  Heel pain, bilateral     The gabapentin at nighttime has helped she no longer has healed discomfort  All medications and routine orders were reviewed and updated as needed  Plan discussed with: Patient    Chief Complaint     Chief Complaint   Patient presents with    Follow-up        History of Present Illness     Patient returns to the office today she appears to be doing well she states that the discomfort that she had in her heels has subsided I had given her gabapentin to take at nighttime 100 mg a day  She states that the problem has resolved  I also did check blood work her blood work reveals evidence of chronic renal failure stage 3 with a GFR of 31 and a creatinine 1 50  She has no evidence of anemia her hemoglobin is 13 5 with a hematocrit of 41 1  Her TSH slightly elevated at 5 44  She tells me her diarrhea has improved  She appears to be doing well she does have issues with her walker her right break does not appear to be working well and we need to have this looked into          HISTORY:  Past Surgical History:   Procedure Laterality Date    HYSTERECTOMY        Past Medical History:   Diagnosis Date    Closed displaced fracture of acromial end of left clavicle with routine healing     Disease of thyroid gland     Endometrial cancer ([address] )     Hip pain, left     History of rib fracture     MCI (mild cognitive impairment)     Osteoarthritis of hand     Urinary frequency     Urinary incontinence      Family History   Problem Relation Age of Onset    Breast cancer Mother     Diabetes Mother     Multiple sclerosis Father      Social History     Socioeconomic History    Marital status:      Spouse name: Not on file    Number of children: Not on file    Years of education: Not on file    Highest education level: Not on file   Occupational History    Occupation: retired   Social Needs    Financial resource strain: Not on file    Food insecurity:     Worry: Not on file     Inability: Not on file   [employer] needs:     Medical: Not on file     Non-medical: Not on file   Tobacco Use    Smoking status: Current Every Day Smoker     Packs/day: 0 20     Types: Cigarettes    Smokeless tobacco: Never Used   Substance and Sexual Activity    Alcohol use:  Yes     Alcohol/week: 7 0 standard drinks     Types: 7 Shots of liquor per week    Drug use: No    Sexual activity: Not on file   Lifestyle    Physical activity:     Days per week: Not on file     Minutes per session: Not on file    Stress: Not on file   Relationships    Social connections:     Talks on phone: Not on file     Gets together: Not on file     Attends [employer] service: Not on file     Active member of club or organization: Not on file     Attends meetings of clubs or organizations: Not on file     Relationship status: Not on file    Intimate partner violence:     Fear of current or ex partner: Not on file     Emotionally abused: Not on file     Physically abused: Not on file     Forced sexual activity: Not on file   Other Topics Concern    Not on file   Social History Narrative    Not on file       Allergies: Allergies   Allergen Reactions    Megestrol      Annotation - [id number]: Tasted bad  Refuses to take   Omeprazole Diarrhea     Annotation - [id number]: 08/30/16 Violet(daughter) report that patient experience diarrhea while on meds       Review of Systems     Review of Systems   Constitutional: Negative  HENT: Negative  Eyes: Negative  Respiratory: Negative  Cardiovascular: Negative  Gastrointestinal: Negative  Endocrine: Negative  Genitourinary: Negative  Musculoskeletal: Positive for gait problem  Her 3rd, 4th and 5th finger get a locked in her right hand  Skin: Negative  Allergic/Immunologic: Negative  Hematological: Negative  Psychiatric/Behavioral: Positive for decreased concentration         PHQ-9 Depression Screening    PHQ-9:    Frequency of the following problems over the past two weeks:              Medications and orders       Current Outpatient Medications:     acetaminophen (TYLENOL) 325 mg tablet, Take two tablets by mouth every 6 hours as needed, Disp: , Rfl:     calcium carbonate (OS-CHLOÉ) 600 MG tablet, Take 600 mg by mouth daily, Disp: , Rfl:     denosumab (PROLIA) 60 mg/mL, Inject under the skin every 6 (six) months, Disp: , Rfl:     ferrous sulfate 325 (65 Fe) mg tablet, Take 325 mg by mouth daily at bedtime, Disp: , Rfl:     folic acid (FOLVITE) 394 mcg tablet, Take 400 mcg by mouth daily, Disp: , Rfl:     gabapentin (NEURONTIN) 100 mg capsule, Take 100 mg by mouth daily at bedtime, Disp: , Rfl:     mirtazapine (REMERON) 15 mg tablet, Take 15 mg by mouth daily at bedtime, Disp: , Rfl:     Multiple Vitamin (DAILY JAMES) TABS, Take by mouth daily, Disp: , Rfl:     Nutritional Supplements (ENSURE ACTIVE) LIQD, Take by mouth daily, Disp: , Rfl:     phenylephrine (JUNIOR-SYNEPHRINE) 1 % nasal spray, Administer as needed, Disp: , Rfl:     potassium chloride (MICRO-K) 10 MEQ CR capsule, Take 10 mEq by mouth 2 (two) times a day, Disp: , Rfl:     rOPINIRole (REQUIP) 2 mg tablet, Take 2 mg by mouth daily, Disp: , Rfl:        Objective     Vitals:   Vitals:    02/14/20 1106   BP: 128/80   Pulse: 68   SpO2: 94%   Weight: 42 7 kg (94 lb 3 2 oz)   Height: 5' 4" (1 626 m)       Physical Exam   Constitutional:   Patient is a thin female  Eyes: Pupils are equal, round, and reactive to light  Conjunctivae and EOM are normal    Neck: Normal range of motion  Neck supple  Cardiovascular: Normal rate, regular rhythm, normal heart sounds and intact distal pulses  Pulmonary/Chest: Effort normal and breath sounds normal    Abdominal: Soft  Bowel sounds are normal    Musculoskeletal:   Patient has flexion of 3rd 4th and 5th fingers on right hand she is able to open up the 3rd and 5th digit the 4th digit appears to be locked  Neurological: She is alert  Skin: Skin is warm  Psychiatric: She has a normal mood and affect  Nursing note and vitals reviewed  Pertinent Laboratory/Diagnostic Studies: The following labs/studies were reviewed please see facility chart for details

## 2020-02-14 NOTE — ASSESSMENT & PLAN NOTE
Patient uses a 4 wheeled walker  The break on the right side appears to be faulty will have staff looking to it since this poses a risk for falls

## 2020-03-03 ENCOUNTER — OFFICE VISIT (OUTPATIENT)
Dept: GERIATRICS | Facility: CLINIC | Age: 85
End: 2020-03-03
Payer: MEDICARE

## 2020-03-03 VITALS
HEART RATE: 71 BPM | TEMPERATURE: 97.5 F | WEIGHT: 96.6 LBS | DIASTOLIC BLOOD PRESSURE: 60 MMHG | SYSTOLIC BLOOD PRESSURE: 110 MMHG | OXYGEN SATURATION: 94 % | HEIGHT: 64 IN | BODY MASS INDEX: 16.49 KG/M2 | RESPIRATION RATE: 18 BRPM

## 2020-03-03 DIAGNOSIS — R19.7 DIARRHEA, UNSPECIFIED TYPE: ICD-10-CM

## 2020-03-03 DIAGNOSIS — E44.1 MILD PROTEIN-CALORIE MALNUTRITION (HCC): ICD-10-CM

## 2020-03-03 DIAGNOSIS — R20.0 NUMBNESS OF LEFT HAND: Primary | ICD-10-CM

## 2020-03-03 DIAGNOSIS — G31.84 MCI (MILD COGNITIVE IMPAIRMENT): ICD-10-CM

## 2020-03-03 PROCEDURE — 99214 OFFICE O/P EST MOD 30 MIN: CPT | Performed by: NURSE PRACTITIONER

## 2020-03-03 RX ORDER — ROPINIROLE 2 MG/1
TABLET, FILM COATED ORAL
COMMUNITY
Start: 2017-09-27

## 2020-03-03 NOTE — PROGRESS NOTES
Assessment/Plan:    Numbness of left hand  Secondary to carpal tunnel  Pt wears wrist brace daily    Diarrhea  Diarrhea, with occasional incontinence   Start Florastor daily   Start hydrated and drink plenty of fluids      MCI (mild cognitive impairment)  Forgetful, but no increase in confusion and forgetfulness  Continue with  To assist with ADLs and support in PC unit  SLUMS 15 out of 30      Depression  Pt mood appropriate  No complaints of feeling depressed   Continue with Remeron HS    Mild protein-calorie malnutrition (HCC)  · Eating better  · Cont with good nutrition/hydration, sm frequent meals and snacks  There are no diagnoses linked to this encounter  Subjective:      Patient ID: Sergey Bui is a 80 y o  female  Marleen Chaney is an 80year old female who presented for 3 month follow up  Pt with complaints of diarrhea with occasional incontinence over the last few months  She stopped taking all laxatives and stool softeners, but has not taken anything to help with the diarrhea  Denies SOB, fever, cough, wheezing, abdominal pain, dizziness, headaches, and chest pain  The following portions of the patient's history were reviewed and updated as appropriate: past family history, past medical history, past social history and past surgical history  Review of Systems   Constitutional: Negative for activity change, appetite change, chills, fever and unexpected weight change  Respiratory: Negative for apnea, cough, chest tightness, shortness of breath and wheezing  Cardiovascular: Negative for chest pain, palpitations and leg swelling  Gastrointestinal: Positive for diarrhea  Negative for abdominal pain, constipation, nausea and vomiting  Genitourinary: Positive for frequency  Negative for dysuria and urgency  Musculoskeletal: Negative for back pain  Skin: Negative for color change  Neurological: Negative for headaches     Psychiatric/Behavioral: Negative for agitation and behavioral problems  Objective:      /60 (BP Location: Left arm, Patient Position: Sitting, Cuff Size: Standard)   Pulse 71   Temp 97 5 °F (36 4 °C) (Temporal)   Resp 18   Ht 5' 4" (1 626 m)   Wt 43 8 kg (96 lb 9 6 oz) Comment: w shoes  SpO2 94%   BMI 16 58 kg/m²          Physical Exam   Constitutional: She is oriented to person, place, and time  She appears well-developed and well-nourished  No distress  HENT:   Head: Normocephalic and atraumatic  Cardiovascular: Normal rate and normal heart sounds  Exam reveals no gallop and no friction rub  No murmur heard  Pulmonary/Chest: Effort normal and breath sounds normal  No respiratory distress  She has no wheezes  She has no rales  Abdominal: Soft  Bowel sounds are normal  She exhibits no distension  There is no tenderness  There is no rebound  Musculoskeletal: Normal range of motion  Neurological: She is alert and oriented to person, place, and time  forgetful   Skin: Skin is warm and dry  She is not diaphoretic  Psychiatric: She has a normal mood and affect  Her behavior is normal    Nursing note and vitals reviewed

## 2020-03-03 NOTE — ASSESSMENT & PLAN NOTE
Forgetful, but no increase in confusion and forgetfulness    Continue with  To assist with ADLs and support in [facility] AND [facility] unit  [facility] 15 out of 30

## 2020-03-03 NOTE — ASSESSMENT & PLAN NOTE
Diarrhea, with occasional incontinence   Start Florastor daily   Start hydrated and drink plenty of fluids

## 2020-05-11 ENCOUNTER — LAB REQUISITION (OUTPATIENT)
Dept: LAB | Facility: HOSPITAL | Age: 85
End: 2020-05-11

## 2020-05-11 DIAGNOSIS — U07.1 COVID-19: ICD-10-CM

## 2020-05-11 PROCEDURE — U0003 INFECTIOUS AGENT DETECTION BY NUCLEIC ACID (DNA OR RNA); SEVERE ACUTE RESPIRATORY SYNDROME CORONAVIRUS 2 (SARS-COV-2) (CORONAVIRUS DISEASE [COVID-19]), AMPLIFIED PROBE TECHNIQUE, MAKING USE OF HIGH THROUGHPUT TECHNOLOGIES AS DESCRIBED BY CMS-2020-01-R: HCPCS | Performed by: INTERNAL MEDICINE

## 2020-05-12 LAB — SARS-COV-2 RNA RESP QL NAA+PROBE: NEGATIVE

## 2020-05-13 RX ORDER — LEVOTHYROXINE SODIUM 0.03 MG/1
25 TABLET ORAL DAILY
COMMUNITY
End: 2020-05-15 | Stop reason: DRUGHIGH

## 2020-05-15 ENCOUNTER — IN HOME VISIT (OUTPATIENT)
Dept: GERIATRICS | Facility: CLINIC | Age: 85
End: 2020-05-15
Payer: MEDICARE

## 2020-05-15 VITALS
TEMPERATURE: 97.5 F | SYSTOLIC BLOOD PRESSURE: 118 MMHG | OXYGEN SATURATION: 93 % | HEART RATE: 84 BPM | RESPIRATION RATE: 18 BRPM | DIASTOLIC BLOOD PRESSURE: 62 MMHG

## 2020-05-15 DIAGNOSIS — R60.9 PERIPHERAL EDEMA: Primary | ICD-10-CM

## 2020-05-15 DIAGNOSIS — E03.8 OTHER SPECIFIED HYPOTHYROIDISM: ICD-10-CM

## 2020-05-15 DIAGNOSIS — G25.81 RLS (RESTLESS LEGS SYNDROME): ICD-10-CM

## 2020-05-15 PROBLEM — R60.0 PERIPHERAL EDEMA: Status: ACTIVE | Noted: 2020-05-15

## 2020-05-15 PROCEDURE — 99348 HOME/RES VST EST LOW MDM 30: CPT | Performed by: INTERNAL MEDICINE

## 2020-05-15 RX ORDER — LEVOTHYROXINE SODIUM 0.05 MG/1
50 TABLET ORAL DAILY
COMMUNITY

## 2020-05-21 ENCOUNTER — IN HOME VISIT (OUTPATIENT)
Dept: GERIATRICS | Facility: CLINIC | Age: 85
End: 2020-05-21
Payer: MEDICARE

## 2020-05-21 VITALS
HEART RATE: 74 BPM | DIASTOLIC BLOOD PRESSURE: 57 MMHG | SYSTOLIC BLOOD PRESSURE: 104 MMHG | TEMPERATURE: 97.5 F | OXYGEN SATURATION: 95 % | BODY MASS INDEX: 16.99 KG/M2 | RESPIRATION RATE: 16 BRPM | WEIGHT: 99 LBS

## 2020-05-21 DIAGNOSIS — E44.1 MILD PROTEIN-CALORIE MALNUTRITION (HCC): ICD-10-CM

## 2020-05-21 DIAGNOSIS — R60.9 PERIPHERAL EDEMA: Primary | ICD-10-CM

## 2020-05-21 DIAGNOSIS — R26.2 AMBULATORY DYSFUNCTION: ICD-10-CM

## 2020-05-21 DIAGNOSIS — G31.84 MCI (MILD COGNITIVE IMPAIRMENT): ICD-10-CM

## 2020-05-21 PROCEDURE — 99335 PR DOM/R-HOME E/M EST PT LW MOD SEVERITY 25 MINUTES: CPT | Performed by: NURSE PRACTITIONER

## 2020-06-01 ENCOUNTER — IN HOME VISIT (OUTPATIENT)
Dept: GERIATRICS | Facility: CLINIC | Age: 85
End: 2020-06-01
Payer: MEDICARE

## 2020-06-01 VITALS
TEMPERATURE: 98.3 F | OXYGEN SATURATION: 93 % | HEART RATE: 80 BPM | SYSTOLIC BLOOD PRESSURE: 123 MMHG | DIASTOLIC BLOOD PRESSURE: 66 MMHG | RESPIRATION RATE: 20 BRPM

## 2020-06-01 DIAGNOSIS — D50.0 IRON DEFICIENCY ANEMIA DUE TO CHRONIC BLOOD LOSS: ICD-10-CM

## 2020-06-01 DIAGNOSIS — Z87.828 HISTORY OF LACERATION OF SKIN: Primary | ICD-10-CM

## 2020-06-01 DIAGNOSIS — E03.8 OTHER SPECIFIED HYPOTHYROIDISM: ICD-10-CM

## 2020-06-01 DIAGNOSIS — R41.89 COGNITIVE IMPAIRMENT: ICD-10-CM

## 2020-06-01 DIAGNOSIS — N18.30 CHRONIC KIDNEY DISEASE, STAGE III (MODERATE) (HCC): ICD-10-CM

## 2020-06-01 PROCEDURE — 99349 HOME/RES VST EST MOD MDM 40: CPT | Performed by: INTERNAL MEDICINE

## 2020-06-11 ENCOUNTER — IN HOME VISIT (OUTPATIENT)
Dept: GERIATRICS | Facility: CLINIC | Age: 85
End: 2020-06-11
Payer: MEDICARE

## 2020-06-11 VITALS
BODY MASS INDEX: 17.23 KG/M2 | SYSTOLIC BLOOD PRESSURE: 130 MMHG | TEMPERATURE: 97.8 F | DIASTOLIC BLOOD PRESSURE: 75 MMHG | HEART RATE: 96 BPM | OXYGEN SATURATION: 95 % | WEIGHT: 100.4 LBS | RESPIRATION RATE: 18 BRPM

## 2020-06-11 DIAGNOSIS — M54.9 OTHER ACUTE BACK PAIN: Primary | ICD-10-CM

## 2020-06-11 PROCEDURE — 99334 PR DOM/R-HOME E/M EST PT SELF-LMTD/MINOR 15 MINUTES: CPT | Performed by: NURSE PRACTITIONER

## 2020-07-08 ENCOUNTER — OFFICE VISIT (OUTPATIENT)
Dept: GERIATRICS | Facility: CLINIC | Age: 85
End: 2020-07-08
Payer: MEDICARE

## 2020-07-08 DIAGNOSIS — L30.8 DERMATITIS ASSOCIATED WITH MOISTURE: ICD-10-CM

## 2020-07-08 DIAGNOSIS — L89.152 PRESSURE INJURY OF SACRAL REGION, STAGE 2 (HCC): Primary | ICD-10-CM

## 2020-07-08 PROCEDURE — 99335 PR DOM/R-HOME E/M EST PT LW MOD SEVERITY 25 MINUTES: CPT | Performed by: NURSE PRACTITIONER

## 2020-07-08 NOTE — PROGRESS NOTES
Assessment/Plan:  PC seen in apartment    Pressure injury of sacral region, stage 2 ([facility])  · Chronic area of pressure and breakdown, now small open area  · Encourage sm frequent meals and snacks that are protein and nutritionally dense  · Off load pressure from area by repositioning frequently, using gel cushion  · iodosorb gel to open area with coversite  · Recheck in one week    Dermatitis associated with moisture  · Encouraged patient too keep skin clean and dry  · Timed voids and change damp depends often  · Use zinc to skin surrounding open area prn       Diagnoses and all orders for this visit:    Pressure injury of sacral region, stage 2 ([facility] [address] )    Dermatitis associated with moisture        Subjective:      Patient ID: Maria Esther Ovalles is a 80 y o  female  Nursing requested visit for patient as she has been complaining of left hip pain  Patient has chronic sacral breakdown  Currently has small open area on left side of sacrum  Patient denies any further sore areas  States back pain from last visit has since resolved  Patient reports otherwise she is doing well  Eating/drinking and sleeping well  Denies cp/sob/cough  Denies gi/gu distress  Problem list, meds, allergies reviewed  Review of Systems   Constitutional: Negative for activity change, appetite change, chills and fatigue  HENT: Negative for congestion  Respiratory: Negative for cough and shortness of breath  Cardiovascular: Negative for chest pain  Gastrointestinal: Negative for abdominal pain, constipation, diarrhea, nausea and vomiting  Genitourinary: Negative for difficulty urinating  Musculoskeletal: Positive for gait problem  Skin: Positive for wound (left sacrum)  Neurological: Negative for dizziness and light-headedness  Psychiatric/Behavioral: Positive for decreased concentration (forgetful)  Objective: There were no vitals taken for this visit           Physical Exam   Constitutional: She is oriented to person, place, and time  She appears well-developed and well-nourished  No distress  thin   HENT:   Head: Normocephalic  Cardiovascular: Normal rate and normal heart sounds  Exam reveals no gallop and no friction rub  No murmur heard  Pulmonary/Chest: Effort normal and breath sounds normal  No respiratory distress  She has no wheezes  She has no rales  Abdominal: Soft  Bowel sounds are normal  She exhibits no distension  There is no tenderness  There is no rebound  Musculoskeletal: Normal range of motion  Neurological: She is alert and oriented to person, place, and time  forgetful   Skin: Skin is warm and dry  She is not diaphoretic  Sacrum - left  Pressure area - circular, open, no depth  About 1x0 5  Erythema surrounding on both sides of sacrum  No s/s infection   Psychiatric: She has a normal mood and affect  Her behavior is normal    Nursing note and vitals reviewed

## 2020-07-08 NOTE — ASSESSMENT & PLAN NOTE
· Encouraged patient too keep skin clean and dry  · Timed voids and change damp depends often  · Use zinc to skin surrounding open area prn

## 2020-07-08 NOTE — ASSESSMENT & PLAN NOTE
· Chronic area of pressure and breakdown, now small open area  · Encourage sm frequent meals and snacks that are protein and nutritionally dense  · Off load pressure from area by repositioning frequently, using gel cushion  · iodosorb gel to open area with coversite  · Recheck in one week

## 2020-07-14 ENCOUNTER — IN HOME VISIT (OUTPATIENT)
Dept: GERIATRICS | Facility: CLINIC | Age: 85
End: 2020-07-14
Payer: MEDICARE

## 2020-07-14 VITALS
OXYGEN SATURATION: 94 % | DIASTOLIC BLOOD PRESSURE: 67 MMHG | WEIGHT: 99 LBS | BODY MASS INDEX: 16.99 KG/M2 | SYSTOLIC BLOOD PRESSURE: 132 MMHG | HEART RATE: 75 BPM | TEMPERATURE: 96.9 F | RESPIRATION RATE: 18 BRPM

## 2020-07-14 DIAGNOSIS — L89.152 PRESSURE INJURY OF SACRAL REGION, STAGE 2 (HCC): ICD-10-CM

## 2020-07-14 DIAGNOSIS — L30.8 DERMATITIS ASSOCIATED WITH MOISTURE: Primary | ICD-10-CM

## 2020-07-14 PROCEDURE — 99335 PR DOM/R-HOME E/M EST PT LW MOD SEVERITY 25 MINUTES: CPT | Performed by: NURSE PRACTITIONER

## 2020-07-14 NOTE — ASSESSMENT & PLAN NOTE
· Improvement, healed  · Still erythremic, no ss infection  · Dc iodosorb, change to skin prep barrier bid  · Off load pressure, keep dry

## 2020-07-27 DIAGNOSIS — K92.1 HEMATOCHEZIA: Primary | ICD-10-CM

## 2020-07-28 ENCOUNTER — TELEPHONE (OUTPATIENT)
Dept: OTHER | Facility: OTHER | Age: 85
End: 2020-07-28

## 2020-07-28 ENCOUNTER — IN HOME VISIT (OUTPATIENT)
Dept: GERIATRICS | Facility: CLINIC | Age: 85
End: 2020-07-28
Payer: MEDICARE

## 2020-07-28 VITALS
WEIGHT: 97.6 LBS | BODY MASS INDEX: 16.75 KG/M2 | RESPIRATION RATE: 20 BRPM | HEART RATE: 94 BPM | DIASTOLIC BLOOD PRESSURE: 68 MMHG | TEMPERATURE: 97.4 F | SYSTOLIC BLOOD PRESSURE: 93 MMHG | OXYGEN SATURATION: 93 %

## 2020-07-28 DIAGNOSIS — K92.1 BLOOD IN STOOL: Primary | ICD-10-CM

## 2020-07-28 PROCEDURE — 99334 PR DOM/R-HOME E/M EST PT SELF-LMTD/MINOR 15 MINUTES: CPT | Performed by: NURSE PRACTITIONER

## 2020-07-28 RX ORDER — SACCHAROMYCES BOULARDII 250 MG
250 CAPSULE ORAL DAILY
COMMUNITY

## 2020-07-28 NOTE — PROGRESS NOTES
Assessment/Plan:  PC pt seen in apartment d/t covid restrictions at facilty    Blood in stool  · Patient reports x1 episode last week, none since  No concerns  · Heme test stool x3  · Repeat CBC with diff in a week  · Patient to notify nursing and leave toilet and flushed if it occurs again    So they can assess       There are no diagnoses linked to this encounter  Subjective:      Patient ID: Blane Redd is a 80 y o  female  Patient seen per nursing request   Per notes, on 7/24 patient had called karina stating "there was a lot of blood in the toliet "  Since then, she is denying bleeding per notes  Per notes, no h/o hemorrhoids  Stat cbc w/diff was done yesterday showing h/h 15 5/46 6, wbc 6 9, pl 181  Patient states she recalls having episode of blood in stool last week at some point she has had this happened 1 time  She is unsure she had hard stool but does sort of remember straining at time of blood she  She reports that was a lot of blood but she did not show anybody, told her daughter who reported to nursing  She states is not happened since  She denies any nausea, vomiting, change in appetite, abdominal pain  She denies any lightheadedness or dizziness  She states that her stools tend  to be lose, but she tries to move bowels on a daily basis so she does not become constipated  She states that there is a large amount of blood in the bowl  She denies any history of hemorrhoids that she is aware of  She does not recall having blood work drawn however was done yesterday        The following portions of the patient's history were reviewed and updated as appropriate: past family history, past medical history, past social history and past surgical history  Review of Systems   Reason unable to perform ROS: Limited by memory loss  Constitutional: Negative for appetite change and fatigue  Respiratory: Negative for cough and shortness of breath      Cardiovascular: Negative for chest pain  Gastrointestinal: Positive for blood in stool (x1 episode) and diarrhea (Chronic)  Negative for abdominal distention, abdominal pain, nausea, rectal pain and vomiting  Musculoskeletal: Positive for gait problem  Psychiatric/Behavioral: Positive for decreased concentration (Forgetful)  Objective:      BP 93/68   Pulse 94   Temp (!) 97 4 °F (36 3 °C)   Resp 20   Wt 44 3 kg (97 lb 9 6 oz)   SpO2 93%   BMI 16 75 kg/m²     Med list reviewed and updated in Epic    Current Outpatient Medications:     saccharomyces boulardii (FLORASTOR) 250 mg capsule, Take 250 mg by mouth daily, Disp: , Rfl:     acetaminophen (TYLENOL) 325 mg tablet, Take two tablets by mouth every 6 hours as needed, Disp: , Rfl:     calcium carbonate (OS-CHLOÉ) 600 MG tablet, Take 600 mg by mouth daily, Disp: , Rfl:     denosumab (PROLIA) 60 mg/mL, Inject under the skin every 6 (six) months, Disp: , Rfl:     ferrous sulfate 325 (65 Fe) mg tablet, Take 325 mg by mouth daily at bedtime, Disp: , Rfl:     folic acid (FOLVITE) 289 mcg tablet, Take 400 mcg by mouth daily, Disp: , Rfl:     gabapentin (NEURONTIN) 100 mg capsule, Take 100 mg by mouth daily at bedtime, Disp: , Rfl:     levothyroxine 50 mcg tablet, Take 50 mcg by mouth daily To take 1 p o  daily on an empty stomach, Disp: , Rfl:     mirtazapine (REMERON) 15 mg tablet, Take 7 5 mg by mouth daily at bedtime , Disp: , Rfl:     Multiple Vitamin (DAILY JAMES) TABS, Take by mouth daily, Disp: , Rfl:     Nutritional Supplements (ENSURE ACTIVE) LIQD, Take by mouth daily, Disp: , Rfl:     phenylephrine (JUNIOR-SYNEPHRINE) 1 % nasal spray, Administer as needed, Disp: , Rfl:     potassium chloride (MICRO-K) 10 MEQ CR capsule, Take 10 mEq by mouth 2 (two) times a day, Disp: , Rfl:     rOPINIRole (REQUIP) 2 mg tablet, Take by mouth, Disp: , Rfl:          Physical Exam   Constitutional: She appears well-developed and well-nourished  No distress     HENT:   Head: Normocephalic  Cardiovascular: Normal rate and normal heart sounds  Exam reveals no gallop and no friction rub  No murmur heard  Pulmonary/Chest: Effort normal and breath sounds normal  No respiratory distress  She has no wheezes  She has no rales  Abdominal: Soft  Bowel sounds are normal  She exhibits no distension  There is no tenderness  There is no rebound  Genitourinary:   Genitourinary Comments: Patient declined rectal exam   Musculoskeletal: Normal range of motion  Neurological: She is alert  Oriented to self, partial T PS forgetful but able to make needs known   Skin: Skin is warm and dry  She is not diaphoretic  Psychiatric: She has a normal mood and affect  Her behavior is normal    Vitals reviewed

## 2020-07-28 NOTE — ASSESSMENT & PLAN NOTE
· Patient reports x1 episode last week, none since  No concerns  · Heme test stool x3  · Repeat CBC with diff in a week  · Patient to notify nursing and leave toilet and flushed if it occurs again      So they can assess

## 2020-07-28 NOTE — TELEPHONE ENCOUNTER
[phone]/[id number] from [facility]/Pt is Marie Session  33/Pt has elevated Stat Labs    Maxwell Hammer was paged at 26    Advised caller to call back in 20-30 mins if they did not hear back from the provider

## 2020-08-07 RX ORDER — POTASSIUM CITRATE 10 MEQ/1
1 TABLET, EXTENDED RELEASE ORAL 2 TIMES DAILY
COMMUNITY
Start: 2020-06-03

## 2020-10-05 ENCOUNTER — APPOINTMENT (EMERGENCY)
Dept: RADIOLOGY | Facility: HOSPITAL | Age: 85
DRG: 562 | End: 2020-10-05
Payer: MEDICARE

## 2020-10-05 ENCOUNTER — APPOINTMENT (INPATIENT)
Dept: RADIOLOGY | Facility: HOSPITAL | Age: 85
DRG: 562 | End: 2020-10-05
Payer: MEDICARE

## 2020-10-05 ENCOUNTER — HOSPITAL ENCOUNTER (INPATIENT)
Facility: HOSPITAL | Age: 85
LOS: 3 days | Discharge: NURSING, REHAB OR LONG-TERM CARE | DRG: 562 | End: 2020-10-08
Attending: EMERGENCY MEDICINE | Admitting: INTERNAL MEDICINE
Payer: MEDICARE
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
## 2020-10-05 DIAGNOSIS — E43 SEVERE PROTEIN-CALORIE MALNUTRITION (HCC): ICD-10-CM

## 2020-10-05 DIAGNOSIS — S50.00XA: ICD-10-CM

## 2020-10-05 DIAGNOSIS — S52.022A CLOSED OLECRANON FRACTURE, LEFT, INITIAL ENCOUNTER: Primary | ICD-10-CM

## 2020-10-05 PROBLEM — T07.XXXA: Status: ACTIVE | Noted: 2020-10-05

## 2020-10-05 PROBLEM — Z66 PHYSICIAN ORDERS FOR LIFE-SUSTAINING TREATMENT (POLST) FORM INDICATES PATIENT WISH FOR DO-NOT-RESUSCITATE STATUS: Status: ACTIVE | Noted: 2020-10-05

## 2020-10-05 PROBLEM — R53.81 PHYSICAL DECONDITIONING: Status: ACTIVE | Noted: 2017-11-28

## 2020-10-05 PROBLEM — F03.90 DEMENTIA (HCC): Status: ACTIVE | Noted: 2020-10-05

## 2020-10-05 PROBLEM — L98.419 SKIN ULCER OF BUTTOCK (HCC): Status: RESOLVED | Noted: 2018-10-12 | Resolved: 2020-10-05

## 2020-10-05 LAB
ANION GAP SERPL CALCULATED.3IONS-SCNC: 6 MMOL/L (ref 4–13)
BASOPHILS # BLD AUTO: 0.08 THOUSANDS/ΜL (ref 0–0.1)
BASOPHILS NFR BLD AUTO: 1 % (ref 0–1)
BUN SERPL-MCNC: 30 MG/DL (ref 5–25)
CALCIUM SERPL-MCNC: 9.6 MG/DL (ref 8.3–10.1)
CHLORIDE SERPL-SCNC: 108 MMOL/L (ref 100–108)
CO2 SERPL-SCNC: 28 MMOL/L (ref 21–32)
CREAT SERPL-MCNC: 1.71 MG/DL (ref 0.6–1.3)
EOSINOPHIL # BLD AUTO: 0.12 THOUSAND/ΜL (ref 0–0.61)
EOSINOPHIL NFR BLD AUTO: 1 % (ref 0–6)
ERYTHROCYTE [DISTWIDTH] IN BLOOD BY AUTOMATED COUNT: 12.9 % (ref 11.6–15.1)
GFR SERPL CREATININE-BSD FRML MDRD: 27 ML/MIN/1.73SQ M
GLUCOSE SERPL-MCNC: 92 MG/DL (ref 65–140)
HCT VFR BLD AUTO: 43.4 % (ref 34.8–46.1)
HGB BLD-MCNC: 13.6 G/DL (ref 11.5–15.4)
IMM GRANULOCYTES # BLD AUTO: 0.03 THOUSAND/UL (ref 0–0.2)
IMM GRANULOCYTES NFR BLD AUTO: 0 % (ref 0–2)
LYMPHOCYTES # BLD AUTO: 1.4 THOUSANDS/ΜL (ref 0.6–4.47)
LYMPHOCYTES NFR BLD AUTO: 17 % (ref 14–44)
MCH RBC QN AUTO: 30.6 PG (ref 26.8–34.3)
MCHC RBC AUTO-ENTMCNC: 31.3 G/DL (ref 31.4–37.4)
MCV RBC AUTO: 98 FL (ref 82–98)
MONOCYTES # BLD AUTO: 0.96 THOUSAND/ΜL (ref 0.17–1.22)
MONOCYTES NFR BLD AUTO: 11 % (ref 4–12)
NEUTROPHILS # BLD AUTO: 5.89 THOUSANDS/ΜL (ref 1.85–7.62)
NEUTS SEG NFR BLD AUTO: 70 % (ref 43–75)
NRBC BLD AUTO-RTO: 0 /100 WBCS
PLATELET # BLD AUTO: 203 THOUSANDS/UL (ref 149–390)
PMV BLD AUTO: 10.9 FL (ref 8.9–12.7)
POTASSIUM SERPL-SCNC: 4.6 MMOL/L (ref 3.5–5.3)
RBC # BLD AUTO: 4.44 MILLION/UL (ref 3.81–5.12)
SODIUM SERPL-SCNC: 142 MMOL/L (ref 136–145)
TSH SERPL DL<=0.05 MIU/L-ACNC: 1.85 UIU/ML (ref 0.36–3.74)
WBC # BLD AUTO: 8.48 THOUSAND/UL (ref 4.31–10.16)

## 2020-10-05 PROCEDURE — 73030 X-RAY EXAM OF SHOULDER: CPT

## 2020-10-05 PROCEDURE — 84443 ASSAY THYROID STIM HORMONE: CPT | Performed by: INTERNAL MEDICINE

## 2020-10-05 PROCEDURE — 36415 COLL VENOUS BLD VENIPUNCTURE: CPT | Performed by: EMERGENCY MEDICINE

## 2020-10-05 PROCEDURE — 73090 X-RAY EXAM OF FOREARM: CPT

## 2020-10-05 PROCEDURE — G1004 CDSM NDSC: HCPCS

## 2020-10-05 PROCEDURE — 2W3BX1Z IMMOBILIZATION OF LEFT UPPER ARM USING SPLINT: ICD-10-PCS | Performed by: ORTHOPAEDIC SURGERY

## 2020-10-05 PROCEDURE — 99285 EMERGENCY DEPT VISIT HI MDM: CPT | Performed by: EMERGENCY MEDICINE

## 2020-10-05 PROCEDURE — 73060 X-RAY EXAM OF HUMERUS: CPT

## 2020-10-05 PROCEDURE — 70450 CT HEAD/BRAIN W/O DYE: CPT

## 2020-10-05 PROCEDURE — 80048 BASIC METABOLIC PNL TOTAL CA: CPT | Performed by: EMERGENCY MEDICINE

## 2020-10-05 PROCEDURE — 72125 CT NECK SPINE W/O DYE: CPT

## 2020-10-05 PROCEDURE — 85025 COMPLETE CBC W/AUTO DIFF WBC: CPT | Performed by: EMERGENCY MEDICINE

## 2020-10-05 PROCEDURE — 73080 X-RAY EXAM OF ELBOW: CPT

## 2020-10-05 PROCEDURE — 1123F ACP DISCUSS/DSCN MKR DOCD: CPT | Performed by: FAMILY MEDICINE

## 2020-10-05 PROCEDURE — 99222 1ST HOSP IP/OBS MODERATE 55: CPT | Performed by: INTERNAL MEDICINE

## 2020-10-05 PROCEDURE — 99285 EMERGENCY DEPT VISIT HI MDM: CPT

## 2020-10-05 PROCEDURE — 24670 CLTX ULNAR FX PROX W/O MNPJ: CPT | Performed by: EMERGENCY MEDICINE

## 2020-10-05 RX ORDER — MIRTAZAPINE 15 MG/1
7.5 TABLET, FILM COATED ORAL
Status: DISCONTINUED | OUTPATIENT
Start: 2020-10-05 | End: 2020-10-08 | Stop reason: HOSPADM

## 2020-10-05 RX ORDER — ACETAMINOPHEN 325 MG/1
650 TABLET ORAL ONCE
Status: COMPLETED | OUTPATIENT
Start: 2020-10-05 | End: 2020-10-05

## 2020-10-05 RX ORDER — OXYCODONE HYDROCHLORIDE 5 MG/1
2.5 TABLET ORAL EVERY 4 HOURS PRN
Status: DISCONTINUED | OUTPATIENT
Start: 2020-10-05 | End: 2020-10-08 | Stop reason: HOSPADM

## 2020-10-05 RX ORDER — LEVOTHYROXINE SODIUM 0.05 MG/1
50 TABLET ORAL
Status: DISCONTINUED | OUTPATIENT
Start: 2020-10-06 | End: 2020-10-08 | Stop reason: HOSPADM

## 2020-10-05 RX ORDER — OXYCODONE HYDROCHLORIDE 5 MG/1
5 TABLET ORAL ONCE
Status: COMPLETED | OUTPATIENT
Start: 2020-10-05 | End: 2020-10-05

## 2020-10-05 RX ORDER — FENTANYL CITRATE 50 UG/ML
50 INJECTION, SOLUTION INTRAMUSCULAR; INTRAVENOUS EVERY 2 HOUR PRN
Status: DISCONTINUED | OUTPATIENT
Start: 2020-10-05 | End: 2020-10-08 | Stop reason: HOSPADM

## 2020-10-05 RX ORDER — ROPINIROLE 1 MG/1
2 TABLET, FILM COATED ORAL
Status: DISCONTINUED | OUTPATIENT
Start: 2020-10-05 | End: 2020-10-08 | Stop reason: HOSPADM

## 2020-10-05 RX ORDER — SODIUM CHLORIDE, SODIUM LACTATE, POTASSIUM CHLORIDE, CALCIUM CHLORIDE 600; 310; 30; 20 MG/100ML; MG/100ML; MG/100ML; MG/100ML
75 INJECTION, SOLUTION INTRAVENOUS CONTINUOUS
Status: DISPENSED | OUTPATIENT
Start: 2020-10-06 | End: 2020-10-06

## 2020-10-05 RX ORDER — ACETAMINOPHEN 325 MG/1
975 TABLET ORAL EVERY 8 HOURS SCHEDULED
Status: DISCONTINUED | OUTPATIENT
Start: 2020-10-05 | End: 2020-10-08 | Stop reason: HOSPADM

## 2020-10-05 RX ORDER — CALCIUM CARBONATE 500(1250)
1 TABLET ORAL
Status: DISCONTINUED | OUTPATIENT
Start: 2020-10-06 | End: 2020-10-08 | Stop reason: HOSPADM

## 2020-10-05 RX ORDER — POTASSIUM CITRATE 10 MEQ/1
10 TABLET, EXTENDED RELEASE ORAL 2 TIMES DAILY
Status: DISCONTINUED | OUTPATIENT
Start: 2020-10-05 | End: 2020-10-05

## 2020-10-05 RX ORDER — LANOLIN ALCOHOL/MO/W.PET/CERES
400 CREAM (GRAM) TOPICAL DAILY
Status: DISCONTINUED | OUTPATIENT
Start: 2020-10-06 | End: 2020-10-08 | Stop reason: HOSPADM

## 2020-10-05 RX ORDER — ACETAMINOPHEN 325 MG/1
325 TABLET ORAL ONCE
Status: COMPLETED | OUTPATIENT
Start: 2020-10-05 | End: 2020-10-05

## 2020-10-05 RX ORDER — POTASSIUM CHLORIDE 750 MG/1
10 TABLET, EXTENDED RELEASE ORAL 2 TIMES DAILY
Status: DISCONTINUED | OUTPATIENT
Start: 2020-10-06 | End: 2020-10-08 | Stop reason: HOSPADM

## 2020-10-05 RX ORDER — FERROUS SULFATE 325(65) MG
325 TABLET ORAL
Status: DISCONTINUED | OUTPATIENT
Start: 2020-10-05 | End: 2020-10-08 | Stop reason: HOSPADM

## 2020-10-05 RX ORDER — SACCHAROMYCES BOULARDII 250 MG
250 CAPSULE ORAL DAILY
Status: DISCONTINUED | OUTPATIENT
Start: 2020-10-06 | End: 2020-10-08 | Stop reason: HOSPADM

## 2020-10-05 RX ORDER — AMOXICILLIN 250 MG
1 CAPSULE ORAL
Status: DISCONTINUED | OUTPATIENT
Start: 2020-10-05 | End: 2020-10-08 | Stop reason: HOSPADM

## 2020-10-05 RX ORDER — HEPARIN SODIUM 5000 [USP'U]/ML
5000 INJECTION, SOLUTION INTRAVENOUS; SUBCUTANEOUS EVERY 8 HOURS SCHEDULED
Status: DISCONTINUED | OUTPATIENT
Start: 2020-10-05 | End: 2020-10-05

## 2020-10-05 RX ORDER — OXYCODONE HYDROCHLORIDE 5 MG/1
5 TABLET ORAL EVERY 4 HOURS PRN
Status: DISCONTINUED | OUTPATIENT
Start: 2020-10-05 | End: 2020-10-08 | Stop reason: HOSPADM

## 2020-10-05 RX ORDER — GABAPENTIN 100 MG/1
100 CAPSULE ORAL
Status: DISCONTINUED | OUTPATIENT
Start: 2020-10-05 | End: 2020-10-08 | Stop reason: HOSPADM

## 2020-10-05 RX ADMIN — ACETAMINOPHEN 650 MG: 325 TABLET, FILM COATED ORAL at 18:03

## 2020-10-05 RX ADMIN — ACETAMINOPHEN 325 MG: 325 TABLET, FILM COATED ORAL at 20:20

## 2020-10-05 RX ADMIN — OXYCODONE HYDROCHLORIDE 5 MG: 5 TABLET ORAL at 20:20

## 2020-10-06 ENCOUNTER — APPOINTMENT (INPATIENT)
Dept: RADIOLOGY | Facility: HOSPITAL | Age: 85
DRG: 562 | End: 2020-10-06
Payer: MEDICARE

## 2020-10-06 LAB
25(OH)D3 SERPL-MCNC: 53.7 NG/ML (ref 30–100)
ABO GROUP BLD: NORMAL
ABO GROUP BLD: NORMAL
ANION GAP SERPL CALCULATED.3IONS-SCNC: 3 MMOL/L (ref 4–13)
APTT PPP: 26 SECONDS (ref 23–37)
ATRIAL RATE: 66 BPM
BLD GP AB SCN SERPL QL: NEGATIVE
BUN SERPL-MCNC: 29 MG/DL (ref 5–25)
CALCIUM SERPL-MCNC: 9.1 MG/DL (ref 8.3–10.1)
CHLORIDE SERPL-SCNC: 109 MMOL/L (ref 100–108)
CO2 SERPL-SCNC: 28 MMOL/L (ref 21–32)
CREAT SERPL-MCNC: 1.56 MG/DL (ref 0.6–1.3)
GFR SERPL CREATININE-BSD FRML MDRD: 30 ML/MIN/1.73SQ M
GLUCOSE SERPL-MCNC: 168 MG/DL (ref 65–140)
INR PPP: 1.03 (ref 0.84–1.19)
P AXIS: 100 DEGREES
POTASSIUM SERPL-SCNC: 4.1 MMOL/L (ref 3.5–5.3)
PR INTERVAL: 208 MS
PROTHROMBIN TIME: 13.5 SECONDS (ref 11.6–14.5)
PTH-INTACT SERPL-MCNC: 31.2 PG/ML (ref 18.4–80.1)
QRS AXIS: 54 DEGREES
QRSD INTERVAL: 66 MS
QT INTERVAL: 388 MS
QTC INTERVAL: 406 MS
RH BLD: POSITIVE
RH BLD: POSITIVE
SARS-COV-2 RNA RESP QL NAA+PROBE: NEGATIVE
SODIUM SERPL-SCNC: 140 MMOL/L (ref 136–145)
SPECIMEN EXPIRATION DATE: NORMAL
T WAVE AXIS: 74 DEGREES
VENTRICULAR RATE: 66 BPM

## 2020-10-06 PROCEDURE — 36415 COLL VENOUS BLD VENIPUNCTURE: CPT | Performed by: ORTHOPAEDIC SURGERY

## 2020-10-06 PROCEDURE — 99232 SBSQ HOSP IP/OBS MODERATE 35: CPT | Performed by: PHYSICIAN ASSISTANT

## 2020-10-06 PROCEDURE — 93005 ELECTROCARDIOGRAM TRACING: CPT

## 2020-10-06 PROCEDURE — 99223 1ST HOSP IP/OBS HIGH 75: CPT | Performed by: ORTHOPAEDIC SURGERY

## 2020-10-06 PROCEDURE — 86901 BLOOD TYPING SEROLOGIC RH(D): CPT | Performed by: ORTHOPAEDIC SURGERY

## 2020-10-06 PROCEDURE — 86900 BLOOD TYPING SEROLOGIC ABO: CPT | Performed by: ORTHOPAEDIC SURGERY

## 2020-10-06 PROCEDURE — 85610 PROTHROMBIN TIME: CPT | Performed by: ORTHOPAEDIC SURGERY

## 2020-10-06 PROCEDURE — NC001 PR NO CHARGE: Performed by: ORTHOPAEDIC SURGERY

## 2020-10-06 PROCEDURE — 80048 BASIC METABOLIC PNL TOTAL CA: CPT | Performed by: INTERNAL MEDICINE

## 2020-10-06 PROCEDURE — 85730 THROMBOPLASTIN TIME PARTIAL: CPT | Performed by: ORTHOPAEDIC SURGERY

## 2020-10-06 PROCEDURE — 71045 X-RAY EXAM CHEST 1 VIEW: CPT

## 2020-10-06 PROCEDURE — 83970 ASSAY OF PARATHORMONE: CPT | Performed by: INTERNAL MEDICINE

## 2020-10-06 PROCEDURE — 93010 ELECTROCARDIOGRAM REPORT: CPT | Performed by: FAMILY MEDICINE

## 2020-10-06 PROCEDURE — U0003 INFECTIOUS AGENT DETECTION BY NUCLEIC ACID (DNA OR RNA); SEVERE ACUTE RESPIRATORY SYNDROME CORONAVIRUS 2 (SARS-COV-2) (CORONAVIRUS DISEASE [COVID-19]), AMPLIFIED PROBE TECHNIQUE, MAKING USE OF HIGH THROUGHPUT TECHNOLOGIES AS DESCRIBED BY CMS-2020-01-R: HCPCS | Performed by: ORTHOPAEDIC SURGERY

## 2020-10-06 PROCEDURE — 86850 RBC ANTIBODY SCREEN: CPT | Performed by: ORTHOPAEDIC SURGERY

## 2020-10-06 PROCEDURE — 82306 VITAMIN D 25 HYDROXY: CPT | Performed by: INTERNAL MEDICINE

## 2020-10-06 RX ORDER — DOCUSATE SODIUM 100 MG/1
100 CAPSULE, LIQUID FILLED ORAL DAILY
COMMUNITY

## 2020-10-06 RX ADMIN — MIRTAZAPINE 7.5 MG: 15 TABLET, FILM COATED ORAL at 23:12

## 2020-10-06 RX ADMIN — Medication 1 TABLET: at 08:37

## 2020-10-06 RX ADMIN — LEVOTHYROXINE SODIUM 50 MCG: 50 TABLET ORAL at 05:40

## 2020-10-06 RX ADMIN — OXYCODONE HYDROCHLORIDE 5 MG: 5 TABLET ORAL at 12:57

## 2020-10-06 RX ADMIN — OXYCODONE HYDROCHLORIDE 5 MG: 5 TABLET ORAL at 20:26

## 2020-10-06 RX ADMIN — ROPINIROLE HYDROCHLORIDE 2 MG: 2 TABLET, FILM COATED ORAL at 00:42

## 2020-10-06 RX ADMIN — ACETAMINOPHEN 975 MG: 325 TABLET, FILM COATED ORAL at 15:20

## 2020-10-06 RX ADMIN — POTASSIUM CHLORIDE 10 MEQ: 750 TABLET, EXTENDED RELEASE ORAL at 08:40

## 2020-10-06 RX ADMIN — DOCUSATE SODIUM AND SENNOSIDES 1 TABLET: 8.6; 5 TABLET ORAL at 23:36

## 2020-10-06 RX ADMIN — POTASSIUM CHLORIDE 10 MEQ: 750 TABLET, EXTENDED RELEASE ORAL at 18:43

## 2020-10-06 RX ADMIN — SODIUM CHLORIDE, SODIUM LACTATE, POTASSIUM CHLORIDE, AND CALCIUM CHLORIDE 75 ML/HR: .6; .31; .03; .02 INJECTION, SOLUTION INTRAVENOUS at 00:50

## 2020-10-06 RX ADMIN — FERROUS SULFATE TAB 325 MG (65 MG ELEMENTAL FE) 325 MG: 325 (65 FE) TAB at 00:42

## 2020-10-06 RX ADMIN — MIRTAZAPINE 7.5 MG: 15 TABLET, FILM COATED ORAL at 00:42

## 2020-10-06 RX ADMIN — DOCUSATE SODIUM AND SENNOSIDES 1 TABLET: 8.6; 5 TABLET ORAL at 00:42

## 2020-10-06 RX ADMIN — DEXTROSE, SODIUM CHLORIDE, SODIUM LACTATE, POTASSIUM CHLORIDE, AND CALCIUM CHLORIDE 500 ML: 5; .6; .31; .03; .02 INJECTION, SOLUTION INTRAVENOUS at 02:54

## 2020-10-06 RX ADMIN — Medication 250 MG: at 08:37

## 2020-10-06 RX ADMIN — ROPINIROLE HYDROCHLORIDE 2 MG: 2 TABLET, FILM COATED ORAL at 23:12

## 2020-10-06 RX ADMIN — CALCIUM 1 TABLET: 500 TABLET ORAL at 07:27

## 2020-10-06 RX ADMIN — FERROUS SULFATE TAB 325 MG (65 MG ELEMENTAL FE) 325 MG: 325 (65 FE) TAB at 23:13

## 2020-10-06 RX ADMIN — FOLIC ACID TAB 400 MCG 400 MCG: 400 TAB at 08:37

## 2020-10-06 RX ADMIN — GABAPENTIN 100 MG: 100 CAPSULE ORAL at 23:41

## 2020-10-06 RX ADMIN — ACETAMINOPHEN 975 MG: 325 TABLET, FILM COATED ORAL at 05:40

## 2020-10-06 RX ADMIN — FENTANYL CITRATE 50 MCG: 50 INJECTION INTRAMUSCULAR; INTRAVENOUS at 05:40

## 2020-10-06 RX ADMIN — ACETAMINOPHEN 975 MG: 325 TABLET, FILM COATED ORAL at 23:13

## 2020-10-06 RX ADMIN — GABAPENTIN 100 MG: 100 CAPSULE ORAL at 00:42

## 2020-10-07 PROBLEM — E44.1 MILD PROTEIN-CALORIE MALNUTRITION (HCC): Status: ACTIVE | Noted: 2020-10-07

## 2020-10-07 PROCEDURE — 99232 SBSQ HOSP IP/OBS MODERATE 35: CPT | Performed by: PHYSICIAN ASSISTANT

## 2020-10-07 PROCEDURE — 97167 OT EVAL HIGH COMPLEX 60 MIN: CPT

## 2020-10-07 PROCEDURE — 97163 PT EVAL HIGH COMPLEX 45 MIN: CPT

## 2020-10-07 RX ORDER — HEPARIN SODIUM 5000 [USP'U]/ML
5000 INJECTION, SOLUTION INTRAVENOUS; SUBCUTANEOUS EVERY 8 HOURS SCHEDULED
Status: DISCONTINUED | OUTPATIENT
Start: 2020-10-07 | End: 2020-10-08 | Stop reason: HOSPADM

## 2020-10-07 RX ADMIN — HEPARIN SODIUM 5000 UNITS: 5000 INJECTION INTRAVENOUS; SUBCUTANEOUS at 22:21

## 2020-10-07 RX ADMIN — FERROUS SULFATE TAB 325 MG (65 MG ELEMENTAL FE) 325 MG: 325 (65 FE) TAB at 22:19

## 2020-10-07 RX ADMIN — FOLIC ACID TAB 400 MCG 400 MCG: 400 TAB at 08:10

## 2020-10-07 RX ADMIN — ACETAMINOPHEN 975 MG: 325 TABLET, FILM COATED ORAL at 22:19

## 2020-10-07 RX ADMIN — LEVOTHYROXINE SODIUM 50 MCG: 50 TABLET ORAL at 07:28

## 2020-10-07 RX ADMIN — POTASSIUM CHLORIDE 10 MEQ: 750 TABLET, EXTENDED RELEASE ORAL at 17:31

## 2020-10-07 RX ADMIN — MIRTAZAPINE 7.5 MG: 15 TABLET, FILM COATED ORAL at 22:19

## 2020-10-07 RX ADMIN — GABAPENTIN 100 MG: 100 CAPSULE ORAL at 22:19

## 2020-10-07 RX ADMIN — ACETAMINOPHEN 975 MG: 325 TABLET, FILM COATED ORAL at 07:28

## 2020-10-07 RX ADMIN — ACETAMINOPHEN 975 MG: 325 TABLET, FILM COATED ORAL at 15:12

## 2020-10-07 RX ADMIN — Medication 1 TABLET: at 08:10

## 2020-10-07 RX ADMIN — Medication 250 MG: at 08:10

## 2020-10-07 RX ADMIN — ROPINIROLE HYDROCHLORIDE 2 MG: 2 TABLET, FILM COATED ORAL at 22:19

## 2020-10-07 RX ADMIN — CALCIUM 1 TABLET: 500 TABLET ORAL at 08:10

## 2020-10-07 RX ADMIN — POTASSIUM CHLORIDE 10 MEQ: 750 TABLET, EXTENDED RELEASE ORAL at 08:10

## 2020-10-08 VITALS
WEIGHT: 106.5 LBS | RESPIRATION RATE: 16 BRPM | TEMPERATURE: 98 F | SYSTOLIC BLOOD PRESSURE: 108 MMHG | OXYGEN SATURATION: 97 % | HEART RATE: 74 BPM | HEIGHT: 64 IN | BODY MASS INDEX: 18.18 KG/M2 | DIASTOLIC BLOOD PRESSURE: 62 MMHG

## 2020-10-08 LAB
ANION GAP SERPL CALCULATED.3IONS-SCNC: 2 MMOL/L (ref 4–13)
BUN SERPL-MCNC: 23 MG/DL (ref 5–25)
CALCIUM SERPL-MCNC: 9.1 MG/DL (ref 8.3–10.1)
CHLORIDE SERPL-SCNC: 112 MMOL/L (ref 100–108)
CO2 SERPL-SCNC: 27 MMOL/L (ref 21–32)
CREAT SERPL-MCNC: 1.33 MG/DL (ref 0.6–1.3)
GFR SERPL CREATININE-BSD FRML MDRD: 36 ML/MIN/1.73SQ M
GLUCOSE SERPL-MCNC: 89 MG/DL (ref 65–140)
POTASSIUM SERPL-SCNC: 4 MMOL/L (ref 3.5–5.3)
SODIUM SERPL-SCNC: 141 MMOL/L (ref 136–145)

## 2020-10-08 PROCEDURE — 97116 GAIT TRAINING THERAPY: CPT

## 2020-10-08 PROCEDURE — 99239 HOSP IP/OBS DSCHRG MGMT >30: CPT | Performed by: PHYSICIAN ASSISTANT

## 2020-10-08 PROCEDURE — 97530 THERAPEUTIC ACTIVITIES: CPT

## 2020-10-08 PROCEDURE — 80048 BASIC METABOLIC PNL TOTAL CA: CPT | Performed by: PHYSICIAN ASSISTANT

## 2020-10-08 RX ADMIN — ACETAMINOPHEN 975 MG: 325 TABLET, FILM COATED ORAL at 07:00

## 2020-10-08 RX ADMIN — LEVOTHYROXINE SODIUM 50 MCG: 50 TABLET ORAL at 07:00

## 2020-10-08 RX ADMIN — HEPARIN SODIUM 5000 UNITS: 5000 INJECTION INTRAVENOUS; SUBCUTANEOUS at 08:09

## 2020-10-08 RX ADMIN — CALCIUM 1 TABLET: 500 TABLET ORAL at 07:48

## 2020-10-08 RX ADMIN — Medication 1 TABLET: at 09:21

## 2020-10-08 RX ADMIN — FOLIC ACID TAB 400 MCG 400 MCG: 400 TAB at 09:21

## 2020-10-08 RX ADMIN — Medication 250 MG: at 09:21

## 2020-10-08 RX ADMIN — POTASSIUM CHLORIDE 10 MEQ: 750 TABLET, EXTENDED RELEASE ORAL at 09:21

## 2020-10-09 ENCOUNTER — NURSING HOME VISIT (OUTPATIENT)
Dept: GERIATRICS | Facility: OTHER | Age: 85
End: 2020-10-09
Payer: MEDICARE

## 2020-10-09 VITALS
TEMPERATURE: 97.1 F | SYSTOLIC BLOOD PRESSURE: 142 MMHG | OXYGEN SATURATION: 96 % | HEART RATE: 71 BPM | RESPIRATION RATE: 20 BRPM | DIASTOLIC BLOOD PRESSURE: 61 MMHG

## 2020-10-09 DIAGNOSIS — Z71.89 GOALS OF CARE, COUNSELING/DISCUSSION: ICD-10-CM

## 2020-10-09 DIAGNOSIS — R41.89 COGNITIVE IMPAIRMENT: ICD-10-CM

## 2020-10-09 DIAGNOSIS — M80.00XA AGE-RELATED OSTEOPOROSIS WITH CURRENT PATHOLOGICAL FRACTURE, INITIAL ENCOUNTER: ICD-10-CM

## 2020-10-09 DIAGNOSIS — L89.152 PRESSURE INJURY OF SACRAL REGION, STAGE 2 (HCC): ICD-10-CM

## 2020-10-09 DIAGNOSIS — R26.2 AMBULATORY DYSFUNCTION: ICD-10-CM

## 2020-10-09 DIAGNOSIS — S52.022D CLOSED FRACTURE OF OLECRANON PROCESS OF LEFT ULNA WITH ROUTINE HEALING, SUBSEQUENT ENCOUNTER: Primary | ICD-10-CM

## 2020-10-09 DIAGNOSIS — N18.30 CRF (CHRONIC RENAL FAILURE), STAGE 3 (MODERATE) (HCC): ICD-10-CM

## 2020-10-09 PROCEDURE — 99305 1ST NF CARE MODERATE MDM 35: CPT | Performed by: INTERNAL MEDICINE

## 2020-10-12 ENCOUNTER — OFFICE VISIT (OUTPATIENT)
Dept: OBGYN CLINIC | Facility: CLINIC | Age: 85
End: 2020-10-12
Payer: MEDICARE

## 2020-10-12 ENCOUNTER — APPOINTMENT (OUTPATIENT)
Dept: RADIOLOGY | Facility: CLINIC | Age: 85
End: 2020-10-12
Payer: COMMERCIAL

## 2020-10-12 VITALS — HEIGHT: 64 IN | BODY MASS INDEX: 18.28 KG/M2 | SYSTOLIC BLOOD PRESSURE: 100 MMHG | DIASTOLIC BLOOD PRESSURE: 60 MMHG

## 2020-10-12 DIAGNOSIS — T14.8XXA FRACTURE: ICD-10-CM

## 2020-10-12 DIAGNOSIS — S52.022A CLOSED FRACTURE OF OLECRANON PROCESS OF LEFT ULNA, INITIAL ENCOUNTER: Primary | ICD-10-CM

## 2020-10-12 PROCEDURE — 24670 CLTX ULNAR FX PROX W/O MNPJ: CPT | Performed by: ORTHOPAEDIC SURGERY

## 2020-10-12 PROCEDURE — 99203 OFFICE O/P NEW LOW 30 MIN: CPT | Performed by: ORTHOPAEDIC SURGERY

## 2020-10-12 PROCEDURE — 73080 X-RAY EXAM OF ELBOW: CPT

## 2020-10-13 ENCOUNTER — OFFICE VISIT (OUTPATIENT)
Dept: GERIATRICS | Facility: CLINIC | Age: 85
End: 2020-10-13
Payer: MEDICARE

## 2020-10-13 DIAGNOSIS — E44.1 MILD PROTEIN-CALORIE MALNUTRITION (HCC): ICD-10-CM

## 2020-10-13 DIAGNOSIS — L30.8 DERMATITIS ASSOCIATED WITH MOISTURE: ICD-10-CM

## 2020-10-13 DIAGNOSIS — S52.022D CLOSED FRACTURE OF OLECRANON PROCESS OF LEFT ULNA WITH ROUTINE HEALING, SUBSEQUENT ENCOUNTER: Primary | ICD-10-CM

## 2020-10-13 DIAGNOSIS — R26.2 AMBULATORY DYSFUNCTION: ICD-10-CM

## 2020-10-13 PROCEDURE — 99309 SBSQ NF CARE MODERATE MDM 30: CPT | Performed by: NURSE PRACTITIONER

## 2020-10-14 VITALS
TEMPERATURE: 97.7 F | RESPIRATION RATE: 20 BRPM | OXYGEN SATURATION: 95 % | DIASTOLIC BLOOD PRESSURE: 59 MMHG | HEART RATE: 74 BPM | SYSTOLIC BLOOD PRESSURE: 132 MMHG

## 2020-10-21 ENCOUNTER — OFFICE VISIT (OUTPATIENT)
Dept: GERIATRICS | Facility: CLINIC | Age: 85
End: 2020-10-21
Payer: MEDICARE

## 2020-10-21 DIAGNOSIS — S52.022D CLOSED FRACTURE OF OLECRANON PROCESS OF LEFT ULNA WITH ROUTINE HEALING, SUBSEQUENT ENCOUNTER: Primary | ICD-10-CM

## 2020-10-21 DIAGNOSIS — L30.8 DERMATITIS ASSOCIATED WITH MOISTURE: ICD-10-CM

## 2020-10-21 PROCEDURE — 99308 SBSQ NF CARE LOW MDM 20: CPT | Performed by: NURSE PRACTITIONER

## 2020-10-23 ENCOUNTER — NURSING HOME VISIT (OUTPATIENT)
Dept: GERIATRICS | Facility: OTHER | Age: 85
End: 2020-10-23
Payer: MEDICARE

## 2020-10-23 DIAGNOSIS — R26.2 AMBULATORY DYSFUNCTION: ICD-10-CM

## 2020-10-23 DIAGNOSIS — S52.022D CLOSED FRACTURE OF OLECRANON PROCESS OF LEFT ULNA WITH ROUTINE HEALING, SUBSEQUENT ENCOUNTER: Primary | ICD-10-CM

## 2020-10-23 DIAGNOSIS — G25.81 RLS (RESTLESS LEGS SYNDROME): ICD-10-CM

## 2020-10-23 DIAGNOSIS — N18.30 CRF (CHRONIC RENAL FAILURE), STAGE 3 (MODERATE) (HCC): ICD-10-CM

## 2020-10-23 DIAGNOSIS — E03.8 OTHER SPECIFIED HYPOTHYROIDISM: ICD-10-CM

## 2020-10-23 DIAGNOSIS — F03.90 DEMENTIA WITHOUT BEHAVIORAL DISTURBANCE, UNSPECIFIED DEMENTIA TYPE (HCC): ICD-10-CM

## 2020-10-23 PROCEDURE — 99316 NF DSCHRG MGMT 30 MIN+: CPT | Performed by: NURSE PRACTITIONER

## 2020-10-28 VITALS
DIASTOLIC BLOOD PRESSURE: 66 MMHG | SYSTOLIC BLOOD PRESSURE: 147 MMHG | OXYGEN SATURATION: 97 % | TEMPERATURE: 97 F | HEART RATE: 81 BPM | RESPIRATION RATE: 20 BRPM

## 2020-11-04 ENCOUNTER — OFFICE VISIT (OUTPATIENT)
Dept: GERIATRICS | Facility: CLINIC | Age: 85
End: 2020-11-04
Payer: MEDICARE

## 2020-11-04 VITALS — OXYGEN SATURATION: 95 % | BODY MASS INDEX: 17.16 KG/M2 | WEIGHT: 100 LBS | TEMPERATURE: 98.3 F

## 2020-11-04 DIAGNOSIS — R26.2 AMBULATORY DYSFUNCTION: ICD-10-CM

## 2020-11-04 DIAGNOSIS — G89.29 CHRONIC BILATERAL LOW BACK PAIN WITHOUT SCIATICA: ICD-10-CM

## 2020-11-04 DIAGNOSIS — F03.90 DEMENTIA WITHOUT BEHAVIORAL DISTURBANCE, UNSPECIFIED DEMENTIA TYPE (HCC): Primary | ICD-10-CM

## 2020-11-04 DIAGNOSIS — M54.50 CHRONIC BILATERAL LOW BACK PAIN WITHOUT SCIATICA: ICD-10-CM

## 2020-11-04 DIAGNOSIS — S52.022D CLOSED FRACTURE OF OLECRANON PROCESS OF LEFT ULNA WITH ROUTINE HEALING, SUBSEQUENT ENCOUNTER: ICD-10-CM

## 2020-11-04 PROCEDURE — 99335 PR DOM/R-HOME E/M EST PT LW MOD SEVERITY 25 MINUTES: CPT | Performed by: NURSE PRACTITIONER

## 2020-11-18 ENCOUNTER — OFFICE VISIT (OUTPATIENT)
Dept: GERIATRICS | Facility: CLINIC | Age: 85
End: 2020-11-18
Payer: MEDICARE

## 2020-11-18 VITALS — TEMPERATURE: 97.9 F | OXYGEN SATURATION: 93 %

## 2020-11-18 DIAGNOSIS — M79.89 SWOLLEN FINGER: Primary | ICD-10-CM

## 2020-11-18 PROCEDURE — 99334 PR DOM/R-HOME E/M EST PT SELF-LMTD/MINOR 15 MINUTES: CPT | Performed by: NURSE PRACTITIONER

## 2020-11-20 ENCOUNTER — OFFICE VISIT (OUTPATIENT)
Dept: OBGYN CLINIC | Facility: HOSPITAL | Age: 85
End: 2020-11-20

## 2020-11-20 ENCOUNTER — HOSPITAL ENCOUNTER (OUTPATIENT)
Dept: RADIOLOGY | Facility: HOSPITAL | Age: 85
Discharge: HOME | End: 2020-11-20
Attending: ORTHOPAEDIC SURGERY
Payer: MEDICARE

## 2020-11-20 VITALS
HEART RATE: 88 BPM | WEIGHT: 100 LBS | BODY MASS INDEX: 17.07 KG/M2 | DIASTOLIC BLOOD PRESSURE: 70 MMHG | SYSTOLIC BLOOD PRESSURE: 107 MMHG | HEIGHT: 64 IN

## 2020-11-20 DIAGNOSIS — S52.022A CLOSED FRACTURE OF OLECRANON PROCESS OF LEFT ULNA, INITIAL ENCOUNTER: Primary | ICD-10-CM

## 2020-11-20 DIAGNOSIS — S52.022A CLOSED FRACTURE OF OLECRANON PROCESS OF LEFT ULNA, INITIAL ENCOUNTER: ICD-10-CM

## 2020-11-20 PROCEDURE — 73080 X-RAY EXAM OF ELBOW: CPT

## 2020-11-20 PROCEDURE — 99024 POSTOP FOLLOW-UP VISIT: CPT | Performed by: ORTHOPAEDIC SURGERY

## 2021-02-02 ENCOUNTER — VBI (OUTPATIENT)
Dept: ADMINISTRATIVE | Facility: OTHER | Age: 86
End: 2021-02-02

## 2021-02-18 ENCOUNTER — VBI (OUTPATIENT)
Dept: ADMINISTRATIVE | Facility: OTHER | Age: 86
End: 2021-02-18

## 2022-09-09 ENCOUNTER — HOSPITAL ENCOUNTER (INPATIENT)
Facility: HOSPITAL | Age: 87
LOS: 1 days | Discharge: NURSING, REHAB OR LONG-TERM CARE | End: 2022-09-12
Attending: EMERGENCY MEDICINE | Admitting: INTERNAL MEDICINE
Payer: MEDICARE

## 2022-09-09 DIAGNOSIS — S09.90XA INJURY OF HEAD, INITIAL ENCOUNTER: ICD-10-CM

## 2022-09-09 DIAGNOSIS — R26.2 AMBULATORY DYSFUNCTION: ICD-10-CM

## 2022-09-09 DIAGNOSIS — M54.9 BACK PAIN: ICD-10-CM
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
## 2022-09-09 PROCEDURE — 99285 EMERGENCY DEPT VISIT HI MDM: CPT

## 2022-09-09 NOTE — Clinical Note
Case was discussed with [facility] resident and the patient's admission status was agreed to be Admission Status: inpatient status to the service of Dr Ang Barakat

## 2022-09-10 ENCOUNTER — APPOINTMENT (EMERGENCY)
Dept: CT IMAGING | Facility: HOSPITAL | Age: 87
End: 2022-09-10
Payer: MEDICARE
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
## 2022-09-10 LAB
ANION GAP SERPL CALCULATED.3IONS-SCNC: 5 MMOL/L (ref 4–13)
BACTERIA UR QL AUTO: ABNORMAL /HPF
BASOPHILS # BLD AUTO: 0.07 THOUSANDS/ΜL (ref 0–0.1)
BASOPHILS NFR BLD AUTO: 1 % (ref 0–1)
BILIRUB UR QL STRIP: NEGATIVE
BUN SERPL-MCNC: 38 MG/DL (ref 5–25)
CALCIUM SERPL-MCNC: 9.4 MG/DL (ref 8.4–10.2)
CHLORIDE SERPL-SCNC: 108 MMOL/L (ref 96–108)
CLARITY UR: ABNORMAL
CO2 SERPL-SCNC: 24 MMOL/L (ref 21–32)
COLOR UR: ABNORMAL
CREAT SERPL-MCNC: 1.47 MG/DL (ref 0.6–1.3)
EOSINOPHIL # BLD AUTO: 0.19 THOUSAND/ΜL (ref 0–0.61)
EOSINOPHIL NFR BLD AUTO: 2 % (ref 0–6)
ERYTHROCYTE [DISTWIDTH] IN BLOOD BY AUTOMATED COUNT: 12.7 % (ref 11.6–15.1)
GFR SERPL CREATININE-BSD FRML MDRD: 31 ML/MIN/1.73SQ M
GLUCOSE P FAST SERPL-MCNC: 85 MG/DL (ref 65–99)
GLUCOSE SERPL-MCNC: 85 MG/DL (ref 65–140)
GLUCOSE UR STRIP-MCNC: NEGATIVE MG/DL
HCT VFR BLD AUTO: 44.7 % (ref 34.8–46.1)
HGB BLD-MCNC: 14.6 G/DL (ref 11.5–15.4)
HGB UR QL STRIP.AUTO: ABNORMAL
IMM GRANULOCYTES # BLD AUTO: 0.05 THOUSAND/UL (ref 0–0.2)
IMM GRANULOCYTES NFR BLD AUTO: 1 % (ref 0–2)
KETONES UR STRIP-MCNC: NEGATIVE MG/DL
LEUKOCYTE ESTERASE UR QL STRIP: ABNORMAL
LYMPHOCYTES # BLD AUTO: 1.19 THOUSANDS/ΜL (ref 0.6–4.47)
LYMPHOCYTES NFR BLD AUTO: 14 % (ref 14–44)
MCH RBC QN AUTO: 33 PG (ref 26.8–34.3)
MCHC RBC AUTO-ENTMCNC: 32.7 G/DL (ref 31.4–37.4)
MCV RBC AUTO: 101 FL (ref 82–98)
MONOCYTES # BLD AUTO: 1.08 THOUSAND/ΜL (ref 0.17–1.22)
MONOCYTES NFR BLD AUTO: 12 % (ref 4–12)
NEUTROPHILS # BLD AUTO: 6.2 THOUSANDS/ΜL (ref 1.85–7.62)
NEUTS SEG NFR BLD AUTO: 70 % (ref 43–75)
NITRITE UR QL STRIP: POSITIVE
NON-SQ EPI CELLS URNS QL MICRO: ABNORMAL /HPF
NRBC BLD AUTO-RTO: 0 /100 WBCS
PH UR STRIP.AUTO: 6.5 [PH]
PLATELET # BLD AUTO: 198 THOUSANDS/UL (ref 149–390)
PMV BLD AUTO: 9.9 FL (ref 8.9–12.7)
POTASSIUM SERPL-SCNC: 4.4 MMOL/L (ref 3.5–5.3)
PROT UR STRIP-MCNC: ABNORMAL MG/DL
RBC # BLD AUTO: 4.43 MILLION/UL (ref 3.81–5.12)
RBC #/AREA URNS AUTO: ABNORMAL /HPF
SODIUM SERPL-SCNC: 137 MMOL/L (ref 135–147)
SP GR UR STRIP.AUTO: 1.01 (ref 1–1.03)
UROBILINOGEN UR STRIP-ACNC: <2 MG/DL
WBC # BLD AUTO: 8.78 THOUSAND/UL (ref 4.31–10.16)
WBC #/AREA URNS AUTO: ABNORMAL /HPF
WBC CLUMPS # UR AUTO: PRESENT /UL

## 2022-09-10 PROCEDURE — 72131 CT LUMBAR SPINE W/O DYE: CPT

## 2022-09-10 PROCEDURE — 97163 PT EVAL HIGH COMPLEX 45 MIN: CPT

## 2022-09-10 PROCEDURE — 71250 CT THORAX DX C-: CPT

## 2022-09-10 PROCEDURE — 85025 COMPLETE CBC W/AUTO DIFF WBC: CPT | Performed by: INTERNAL MEDICINE

## 2022-09-10 PROCEDURE — 70450 CT HEAD/BRAIN W/O DYE: CPT

## 2022-09-10 PROCEDURE — 81001 URINALYSIS AUTO W/SCOPE: CPT | Performed by: EMERGENCY MEDICINE

## 2022-09-10 PROCEDURE — 87077 CULTURE AEROBIC IDENTIFY: CPT | Performed by: EMERGENCY MEDICINE

## 2022-09-10 PROCEDURE — 87086 URINE CULTURE/COLONY COUNT: CPT | Performed by: EMERGENCY MEDICINE

## 2022-09-10 PROCEDURE — 87186 SC STD MICRODIL/AGAR DIL: CPT | Performed by: EMERGENCY MEDICINE

## 2022-09-10 PROCEDURE — G1004 CDSM NDSC: HCPCS

## 2022-09-10 PROCEDURE — 99285 EMERGENCY DEPT VISIT HI MDM: CPT | Performed by: EMERGENCY MEDICINE

## 2022-09-10 PROCEDURE — 99220 PR INITIAL OBSERVATION CARE/DAY 70 MINUTES: CPT | Performed by: INTERNAL MEDICINE

## 2022-09-10 PROCEDURE — 36415 COLL VENOUS BLD VENIPUNCTURE: CPT | Performed by: INTERNAL MEDICINE

## 2022-09-10 PROCEDURE — 80048 BASIC METABOLIC PNL TOTAL CA: CPT | Performed by: INTERNAL MEDICINE

## 2022-09-10 PROCEDURE — 72125 CT NECK SPINE W/O DYE: CPT

## 2022-09-10 RX ORDER — ACETAMINOPHEN 325 MG/1
975 TABLET ORAL ONCE
Status: COMPLETED | OUTPATIENT
Start: 2022-09-10 | End: 2022-09-10

## 2022-09-10 RX ORDER — LIDOCAINE 50 MG/G
2 PATCH TOPICAL DAILY
Status: DISCONTINUED | OUTPATIENT
Start: 2022-09-10 | End: 2022-09-12 | Stop reason: HOSPADM

## 2022-09-10 RX ORDER — ACETAMINOPHEN 325 MG/1
975 TABLET ORAL EVERY 8 HOURS
Status: DISCONTINUED | OUTPATIENT
Start: 2022-09-10 | End: 2022-09-12 | Stop reason: HOSPADM

## 2022-09-10 RX ORDER — ROPINIROLE 1 MG/1
1 TABLET, FILM COATED ORAL DAILY
Status: DISCONTINUED | OUTPATIENT
Start: 2022-09-10 | End: 2022-09-12 | Stop reason: HOSPADM

## 2022-09-10 RX ORDER — MIRTAZAPINE 15 MG/1
7.5 TABLET, FILM COATED ORAL
Status: DISCONTINUED | OUTPATIENT
Start: 2022-09-10 | End: 2022-09-12 | Stop reason: HOSPADM

## 2022-09-10 RX ORDER — AMOXICILLIN 250 MG
1 CAPSULE ORAL DAILY
COMMUNITY

## 2022-09-10 RX ORDER — HEPARIN SODIUM 5000 [USP'U]/ML
5000 INJECTION, SOLUTION INTRAVENOUS; SUBCUTANEOUS EVERY 8 HOURS SCHEDULED
Status: DISCONTINUED | OUTPATIENT
Start: 2022-09-10 | End: 2022-09-12 | Stop reason: HOSPADM

## 2022-09-10 RX ORDER — CEPHALEXIN 250 MG/1
500 CAPSULE ORAL ONCE
Status: COMPLETED | OUTPATIENT
Start: 2022-09-10 | End: 2022-09-10

## 2022-09-10 RX ORDER — OXYCODONE HYDROCHLORIDE 5 MG/1
2.5 TABLET ORAL EVERY 6 HOURS PRN
Status: DISCONTINUED | OUTPATIENT
Start: 2022-09-10 | End: 2022-09-12 | Stop reason: HOSPADM

## 2022-09-10 RX ORDER — SODIUM CHLORIDE 9 MG/ML
100 INJECTION, SOLUTION INTRAVENOUS CONTINUOUS
Status: DISCONTINUED | OUTPATIENT
Start: 2022-09-10 | End: 2022-09-11

## 2022-09-10 RX ORDER — FERROUS SULFATE 325(65) MG
325 TABLET ORAL
Status: DISCONTINUED | OUTPATIENT
Start: 2022-09-10 | End: 2022-09-12 | Stop reason: HOSPADM

## 2022-09-10 RX ORDER — AMOXICILLIN 250 MG
1 CAPSULE ORAL DAILY
Status: DISCONTINUED | OUTPATIENT
Start: 2022-09-10 | End: 2022-09-12 | Stop reason: HOSPADM

## 2022-09-10 RX ORDER — LANOLIN ALCOHOL/MO/W.PET/CERES
400 CREAM (GRAM) TOPICAL DAILY
Status: DISCONTINUED | OUTPATIENT
Start: 2022-09-10 | End: 2022-09-12 | Stop reason: HOSPADM

## 2022-09-10 RX ORDER — CEFTRIAXONE 1 G/50ML
1000 INJECTION, SOLUTION INTRAVENOUS EVERY 24 HOURS
Status: DISCONTINUED | OUTPATIENT
Start: 2022-09-10 | End: 2022-09-11

## 2022-09-10 RX ORDER — ACETAMINOPHEN 325 MG/1
650 TABLET ORAL ONCE
Status: DISCONTINUED | OUTPATIENT
Start: 2022-09-10 | End: 2022-09-10

## 2022-09-10 RX ORDER — LEVOTHYROXINE SODIUM 0.05 MG/1
50 TABLET ORAL
Status: DISCONTINUED | OUTPATIENT
Start: 2022-09-10 | End: 2022-09-12 | Stop reason: HOSPADM

## 2022-09-10 RX ORDER — GABAPENTIN 100 MG/1
100 CAPSULE ORAL
Status: DISCONTINUED | OUTPATIENT
Start: 2022-09-10 | End: 2022-09-12 | Stop reason: HOSPADM

## 2022-09-10 RX ORDER — CALCIUM CARBONATE 500(1250)
1 TABLET ORAL
Status: DISCONTINUED | OUTPATIENT
Start: 2022-09-10 | End: 2022-09-12 | Stop reason: HOSPADM

## 2022-09-10 RX ORDER — POTASSIUM CITRATE 10 MEQ/1
10 TABLET, EXTENDED RELEASE ORAL 2 TIMES DAILY
Status: DISCONTINUED | OUTPATIENT
Start: 2022-09-10 | End: 2022-09-12 | Stop reason: HOSPADM

## 2022-09-10 RX ORDER — OXYCODONE HYDROCHLORIDE 5 MG/1
5 TABLET ORAL ONCE
Status: COMPLETED | OUTPATIENT
Start: 2022-09-10 | End: 2022-09-10

## 2022-09-10 RX ORDER — ONDANSETRON 2 MG/ML
4 INJECTION INTRAMUSCULAR; INTRAVENOUS EVERY 6 HOURS PRN
Status: DISCONTINUED | OUTPATIENT
Start: 2022-09-10 | End: 2022-09-12 | Stop reason: HOSPADM

## 2022-09-10 RX ORDER — SACCHAROMYCES BOULARDII 250 MG
250 CAPSULE ORAL DAILY
Status: DISCONTINUED | OUTPATIENT
Start: 2022-09-10 | End: 2022-09-12 | Stop reason: HOSPADM

## 2022-09-10 RX ADMIN — SENNOSIDES AND DOCUSATE SODIUM 1 TABLET: 50; 8.6 TABLET ORAL at 09:52

## 2022-09-10 RX ADMIN — ACETAMINOPHEN 975 MG: 325 TABLET ORAL at 00:51

## 2022-09-10 RX ADMIN — B-COMPLEX W/ C & FOLIC ACID TAB 1 TABLET: TAB at 10:07

## 2022-09-10 RX ADMIN — CEPHALEXIN 500 MG: 250 CAPSULE ORAL at 03:51

## 2022-09-10 RX ADMIN — FOLIC ACID TAB 400 MCG 400 MCG: 400 TAB at 09:49

## 2022-09-10 RX ADMIN — SODIUM CHLORIDE 100 ML/HR: 0.9 INJECTION, SOLUTION INTRAVENOUS at 07:20

## 2022-09-10 RX ADMIN — POTASSIUM CITRATE 10 MEQ: 10 TABLET, EXTENDED RELEASE ORAL at 16:59

## 2022-09-10 RX ADMIN — HEPARIN SODIUM 5000 UNITS: 5000 INJECTION INTRAVENOUS; SUBCUTANEOUS at 07:15

## 2022-09-10 RX ADMIN — MIRTAZAPINE 7.5 MG: 15 TABLET, FILM COATED ORAL at 21:48

## 2022-09-10 RX ADMIN — CEFTRIAXONE 1000 MG: 1 INJECTION, SOLUTION INTRAVENOUS at 10:04

## 2022-09-10 RX ADMIN — LIDOCAINE 5% 2 PATCH: 700 PATCH TOPICAL at 10:12

## 2022-09-10 RX ADMIN — POTASSIUM CITRATE 10 MEQ: 10 TABLET, EXTENDED RELEASE ORAL at 10:21

## 2022-09-10 RX ADMIN — ACETAMINOPHEN 975 MG: 325 TABLET, FILM COATED ORAL at 16:59

## 2022-09-10 RX ADMIN — OXYCODONE HYDROCHLORIDE 5 MG: 5 TABLET ORAL at 03:32

## 2022-09-10 RX ADMIN — ROPINIROLE 1 MG: 1 TABLET, FILM COATED ORAL at 09:50

## 2022-09-10 RX ADMIN — GABAPENTIN 100 MG: 100 CAPSULE ORAL at 21:48

## 2022-09-10 RX ADMIN — HEPARIN SODIUM 5000 UNITS: 5000 INJECTION INTRAVENOUS; SUBCUTANEOUS at 14:33

## 2022-09-10 RX ADMIN — HEPARIN SODIUM 5000 UNITS: 5000 INJECTION INTRAVENOUS; SUBCUTANEOUS at 21:49

## 2022-09-10 RX ADMIN — LEVOTHYROXINE SODIUM 50 MCG: 50 TABLET ORAL at 07:14

## 2022-09-10 RX ADMIN — ACETAMINOPHEN 975 MG: 325 TABLET, FILM COATED ORAL at 09:53

## 2022-09-10 RX ADMIN — CALCIUM 1 TABLET: 500 TABLET ORAL at 09:48

## 2022-09-10 RX ADMIN — Medication 250 MG: at 09:51

## 2022-09-10 RX ADMIN — OXYCODONE HYDROCHLORIDE 2.5 MG: 5 TABLET ORAL at 19:34

## 2022-09-10 RX ADMIN — FERROUS SULFATE TAB 325 MG (65 MG ELEMENTAL FE) 325 MG: 325 (65 FE) TAB at 21:49

## 2022-09-10 NOTE — ASSESSMENT & PLAN NOTE
· Presented with unwitnessed mechanical fall  Denied loss of consciousness  · UA showed positive for nitrite, bacteria, WBC  · CT scan of the head did not reveal intracranial hemorrhage  CT scan of the thoracic spine did not show fracture  CT scan of the lumbar spine did not show fracture but did show degenerative changes  CT scan of the chest showed fusiform ectasia of ascending thoracic aorta measuring 45 mm or less in greatest caliber    · Plan:  · Treat UTI with ceftriaxone  · PT OT eval and treat  · Pain control with tylenol 975 Q8H and lidoderm patch for her back and left chest wall pain

## 2022-09-10 NOTE — PLAN OF CARE
Problem: PHYSICAL THERAPY ADULT  Goal: Performs mobility at highest level of function for planned discharge setting  See evaluation for individualized goals  Description: Treatment/Interventions: Functional transfer training, LE strengthening/ROM, Therapeutic exercise, Endurance training, Cognitive reorientation, Equipment eval/education, Patient/family training, Bed mobility, Gait training  Equipment Recommended: [employer] Cloud (if appropriate and able to use with finger contractures)       See flowsheet documentation for full assessment, interventions and recommendations  Note: Prognosis: Fair  Problem List: Decreased strength, Decreased endurance, Impaired balance, Decreased mobility, Decreased cognition, Pain, Decreased safety awareness, Impaired judgement  Assessment: Pt is a 80 y o  female seen for PT evaluation s/p admit to [address] [place] [address] on 8/18/2022 w/ UTI (urinary tract infection)  Order placed for PT  Comorbidities affecting pt's physical performance at time of assessment include: limited cognition and carpal tunnel syndrome and back pain  Personal factors affecting pt at time of IE include: advanced age, limited insight into impairments and recent fall(s)  Prior to admission, pt was was independent w/ all functional mobility w/ RW and is a long term resident of [facility]  Pt reports having assistance for ADLs  Upon evaluation: Pt requires max A x1 for bed mobility  Limited assessment due to pain  (Please find full objective findings from PT assessment regarding body systems outlined above)  Impairments and limitations also listed above, especially due to  weakness, impaired balance, decreased endurance, pain, decreased activity tolerance, decreased safety awareness, fall risk and decreased cognition  Pt's clinical presentation is currently unstable/unpredictable seen in pt's presentation of uncontrolled pain, fall risk, and significant decline in functional mobility compared to baseline  Pt to benefit from continued skilled PT tx while in hospital and upon DC to address deficits as defined above and maximize level of functional mobility  From PT/mobility standpoint, recommendation at time of d/c would be inpatient rehab vs  Return to facility with PT pending level of assist available  Recommend progression of transfers and ambulation as appropriate  PT Discharge Recommendation: Post acute rehabilitation services (vs  return to facility with PT services pending level of assist available)    See flowsheet documentation for full assessment

## 2022-09-10 NOTE — ED ATTENDING ATTESTATION
9/9/2022  I, Francisca Sanchez MD, saw and evaluated the patient  I have discussed the patient with the resident/non-physician practitioner and agree with the resident's/non-physician practitioner's findings, Plan of Care, and MDM as documented in the resident's/non-physician practitioner's note, except where noted  All available labs and Radiology studies were reviewed  I was present for key portions of any procedure(s) performed by the resident/non-physician practitioner and I was immediately available to provide assistance  At this point I agree with the current assessment done in the Emergency Department  I have conducted an independent evaluation of this patient a history and physical is as follows:    ED Course  ED Course as of 09/10/22 0607   Fri Sep 09, 2022   2358 88F presenting after fall, slipped with her walker   (+) pain left ribcage, neck, back pain  Patient reports that she was walking with her walker, attempted to turn, and did not have a good  on the handles of her walker, leading to a fall  Patient denies loss of consciousness and remembers the event  Since then, patient reports lateral chest wall pain with difficulty taking a deep inspiration due to pain, slight neck pain, and pain along her entire spine  Denies weakness numbness or tingling, urinary retention, bowel incontinence  Denies anticoagulation  Vital signs on arrival notable for hypertension  Physical exam remarkable for an uncomfortable appearing older           Sat Sep 10, 2022   0010 1 g acetaminophen given for pain control  6698 CT cervical spine without contrast  No acute findings   0146 CT head without contrast  No acute findings   0146 On reassessment, patient feels somewhat improved from when she first came in, however still has back discomfort     5917 CT recon only thoracic spine (no charge)  No acute findings   0213 CT lumbar spine without contrast  Multilevel degenerative changes as detailed above including left neural foraminal stenosis at the L2-L3 and L3-L4 levels and right neural foraminal stenosis at the L4-L5 level  Effacement of the left lateral recess at L3-L4 may result in impingement upon   the descending left L4 nerve root  Moderate canal stenosis at L4-L5      0327 RBC, UA(!): 2-4   0327 WBC, UA(!): Innumerable   0328 Leukocytes, UA(!): Large   0328 Nitrite, UA(!): Positive   0328 POCT URINE PROTEIN(!): Trace   0328 Blood, UA(!): Trace   0331 Patient complaining of pain  Oxycodone ordered  5571 Patient given cephalexin for urinary tract infection  1006 On reassessment, patient continues to complain of pain despite oxycodone administration  Case discussed with General Medicine for placement in observation for pain control           Critical Care Time  Procedures

## 2022-09-10 NOTE — PHYSICAL THERAPY NOTE
PHYSICAL THERAPY EVALUATION  NAME: Lino Babcock  AGE:   80 y o  MRN:  [id number]  ADMIT DX: Back pain [M54 9]    PMH:   Past Medical History:   Diagnosis Date    Closed displaced fracture of acromial end of left clavicle with routine healing     Disease of thyroid gland     Endometrial cancer (HCC)     Hip pain, left     History of rib fracture     MCI (mild cognitive impairment)     Osteoarthritis of hand     Urinary frequency     Urinary incontinence      LENGTH OF STAY: 0       09/10/22 0942   PT Last Visit   PT Visit Date 09/10/22   Note Type   Note type Evaluation   Pain Assessment   Pain Assessment Tool 0-10   Pain Score 10 - Worst Possible Pain   Hospital Pain Intervention(s) Repositioned   Restrictions/Precautions   Weight Bearing Precautions Per Order No   Other Precautions Cognitive;Multiple lines;Telemetry; Fall Risk;Pain   Home Living   Type of Home SNF  [facility])   Home Layout One level   [address]   Additional Comments Pt is a questionable historian and is inconsistent with whether she ambulates with RW or not at baseline  Per chart, pt was using RW  Pt reports she ambulates independently within room  Prior Function   Lives With Facility staff   Receives Help From Personal care attendant   ADL Assistance Needs assistance   IADLs Needs assistance   Falls in the last 6 months   (at least 1 admitting fall)   General   Additional Pertinent History 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  Family/Caregiver Present No   Cognition   Overall Cognitive Status Impaired   Arousal/Participation Cooperative   Orientation Level Oriented to person;Oriented to place; Disoriented to time;Disoriented to situation   Memory Decreased short term memory;Decreased long term memory;Decreased recall of recent events;Decreased recall of precautions   Following Commands Follows one step commands with increased time or repetition   Comments Pt identified by name and   Subjective   Subjective Agrees to PT evaluation and is cooperative throughout session  "I just have so much pain in my legs, pain like I've never felt before "   RLE Assessment   RLE Assessment X   Strength RLE   RLE Overall Strength 3+/5  (functionally)   LLE Assessment   LLE Assessment X   Strength LLE   LLE Overall Strength 3+/5  (functionally)   Bed Mobility   Supine to Sit 2  Maximal assistance   Additional items Assist x 1;HOB elevated; Increased time required;Verbal cues;LE management   Sit to Supine 2  Maximal assistance   Additional items Assist x 1; Increased time required;Verbal cues;LE management   Additional Comments Pt able to sit EOB for ~3-4`; complains of pain in BLE (R>L) as well as in upper abdomen/anterior rib cage; declines further mobility at this time due to pain   Transfers   Sit to Stand Unable to assess   Balance   Static Sitting Poor +  (periods of Fair (-))   Endurance Deficit   Endurance Deficit Yes   Endurance Deficit Description limited sitting tolerance, pain   Activity Tolerance   Activity Tolerance Patient limited by pain   Nurse Made Aware Per RN, pt appropriate to evaluate; updated on status and significant LE pain   Assessment   Prognosis Fair   Problem List Decreased strength;Decreased endurance; Impaired balance;Decreased mobility; Decreased cognition;Pain;Decreased safety awareness; Impaired judgement   Assessment Pt is a 80 y o  female seen for PT evaluation s/p admit to [address] [place] [address] on 8/18/2022 w/ UTI (urinary tract infection)  Order placed for PT  Comorbidities affecting pt's physical performance at time of assessment include: limited cognition and carpal tunnel syndrome and back pain  Personal factors affecting pt at time of IE include: advanced age, limited insight into impairments and recent fall(s)  Prior to admission, pt was was independent w/ all functional mobility w/ RW and is a long term resident of [facility]  Pt reports having assistance for ADLs  Upon evaluation: Pt requires max A x1 for bed mobility  Limited assessment due to pain  (Please find full objective findings from PT assessment regarding body systems outlined above)  Impairments and limitations also listed above, especially due to  weakness, impaired balance, decreased endurance, pain, decreased activity tolerance, decreased safety awareness, fall risk and decreased cognition  Pt's clinical presentation is currently unstable/unpredictable seen in pt's presentation of uncontrolled pain, fall risk, and significant decline in functional mobility compared to baseline  Pt to benefit from continued skilled PT tx while in hospital and upon DC to address deficits as defined above and maximize level of functional mobility  From PT/mobility standpoint, recommendation at time of d/c would be inpatient rehab vs  Return to facility with PT pending level of assist available  Recommend progression of transfers and ambulation as appropriate  Goals   Patient Goals to have less pain   STG Expiration Date 09/20/22   Short Term Goal #1 Pt will be able to: (1) perform bed mobility with mod A to promote upright posture and OOB mobility (2) perform sit to stand with mod A to decrease burden of care (3) ambulate at least 10` with mod A and RW to increase activity tolerance (4) increase standing balance by 1 grade to decrease risk of falls   PT Treatment Day 0   Plan   Treatment/Interventions Functional transfer training;LE strengthening/ROM; Therapeutic exercise; Endurance training;Cognitive reorientation;Equipment eval/education;Patient/family training;Bed mobility;Gait training   PT Frequency 3-5x/wk   Recommendation   PT Discharge Recommendation Post acute rehabilitation services  (vs  return to facility with PT services pending level of assist available)   Equipment Recommended Walker  (if appropriate and able to use with finger contractures)   Walker Package Recommended Wheeled walker   [address] Inpatient   Turning in Bed Without Bedrails 2   Lying on Back to Sitting on Edge of Flat Bed 2   Moving Bed to Chair 1   Standing Up From Chair 1   Walk in Room 1   Climb 3-5 Stairs 1   Basic Mobility Inpatient Raw Score 8   Turning Head Towards Sound 4   Follow Simple Instructions 3   Low Function Basic Mobility Raw Score 15   Low Function Basic Mobility Standardized Score 23 9   Highest Level Of Mobility   -[facility] Goal 3: Sit at edge of bed   -[facility] Achieved 3: Sit at edge of bed   End of Consult   Patient Position at End of Consult Supine; All needs within reach     The patient's -[facility] Basic Mobility Inpatient Short Form Raw Score is 8, Standardized Score is 23 9    A Raw Score of less than 16 suggests the patient may benefit from discharge to post-acute rehabilitation services, which DOES coincide with CURRENT above PT recommendations  However please refer to therapist recommendation for discharge planning given other factors that may influence destination  Adapted from [employer] 6-Clicks Basic Mobility and Daily Activity Scores With Discharge Destination  Physical Therapy, 2021;101:1-9   DOI: [email or web address]      Claude Gonzalez PT,DPT

## 2022-09-10 NOTE — ASSESSMENT & PLAN NOTE
· Patient admitted to urinary frequency  · UA showed positive for nitrite, bacteria, WBC    · In the ED, received a dose of keflex  · Will switch to ceftriaxone for now  · Monitor CBC and fever curve  · Follow up urine culture

## 2022-09-10 NOTE — ED PROVIDER NOTES
History  Chief Complaint   Patient presents with   Lizzie Newman     Per ED staff pt is brought to ED for evaluation by EMS after mechanical fall into a corner of the wall  Pt denies headstrike/loc, complains of lower back pain and R hand pain on palpitation  -thinners     Johnny Person is a 80year old female presenting for evaluation after a mechanical fall  The patient states she was walking out of her bathroom when she slipped and fell onto her left side, she does endorse a head strike, she does not take any blood thinners  She is complaining of pain in her entire back, also pain in the left rib cage, no tenderness in her upper or lower extremities  No focal neurological deficits, no numbness or weakness in the extremities, no vision changes  No nausea or vomiting since the event  No chest pain or shortness of breath  History provided by:  Patient   used: No    Fall  Associated symptoms: back pain, headaches and neck pain    Associated symptoms: no abdominal pain, no chest pain, no seizures and no vomiting        Prior to Admission Medications   Prescriptions Last Dose Informant Patient Reported? Taking?    Multiple Vitamin (DAILY JAMES) TABS  Family Member Yes No   Sig: Take by mouth daily   Nutritional Supplements (ENSURE ACTIVE) LIQD  Family Member Yes No   Sig: Take by mouth daily   acetaminophen (TYLENOL) 325 mg tablet  Family Member Yes No   Sig: Take two tablets by mouth every 6 hours as needed   calcium carbonate (OS-CHLOÉ) 600 MG tablet  Family Member Yes No   Sig: Take 600 mg by mouth daily   denosumab (PROLIA) 60 mg/mL  Family Member Yes No   Sig: Inject under the skin every 6 (six) months   docusate sodium (COLACE) 100 mg capsule   Yes No   Sig: Take 100 mg by mouth daily   ferrous sulfate 325 (65 Fe) mg tablet  Family Member Yes No   Sig: Take 325 mg by mouth daily at bedtime   folic acid (FOLVITE) 140 mcg tablet  Family Member Yes No   Sig: Take 400 mcg by mouth daily   gabapentin (NEURONTIN) 100 mg capsule   Yes No   Sig: Take 100 mg by mouth daily at bedtime   levothyroxine 50 mcg tablet   Yes No   Sig: Take 50 mcg by mouth daily To take 1 p o  daily on an empty stomach   mirtazapine (REMERON) 15 mg tablet  Family Member Yes No   Sig: Take 7 5 mg by mouth daily at bedtime    phenylephrine (JUNIOR-SYNEPHRINE) 1 % nasal spray  Family Member Yes No   Sig: Administer as needed   potassium citrate (UROCIT-K) 10 mEq   Yes No   Sig: Take 1 tablet by mouth 2 (two) times a day   rOPINIRole (REQUIP) 2 mg tablet   Yes No   Sig: Take by mouth   saccharomyces boulardii (FLORASTOR) 250 mg capsule   Yes No   Sig: Take 250 mg by mouth daily      Facility-Administered Medications: None       Past Medical History:   Diagnosis Date    Closed displaced fracture of acromial end of left clavicle with routine healing     Disease of thyroid gland     Endometrial cancer (HCC)     Hip pain, left     History of rib fracture     MCI (mild cognitive impairment)     Osteoarthritis of hand     Urinary frequency     Urinary incontinence        Past Surgical History:   Procedure Laterality Date    HYSTERECTOMY         Family History   Problem Relation Age of Onset    Breast cancer Mother     Diabetes Mother     Multiple sclerosis Father      I have reviewed and agree with the history as documented  E-Cigarette/Vaping     E-Cigarette/Vaping Substances     Social History     Tobacco Use    Smoking status: Current Every Day Smoker     Packs/day: 0 20     Types: Cigarettes    Smokeless tobacco: Never Used   Substance Use Topics    Alcohol use: Yes     Alcohol/week: 7 0 standard drinks     Types: 7 Shots of liquor per week    Drug use: No        Review of Systems   Constitutional: Negative for chills and fever  HENT: Negative for ear pain and sore throat  Eyes: Negative for pain and visual disturbance  Respiratory: Negative for cough and shortness of breath      Cardiovascular: Negative for chest pain and palpitations  Gastrointestinal: Negative for abdominal pain and vomiting  Genitourinary: Negative for dysuria and hematuria  Musculoskeletal: Positive for back pain and neck pain  Negative for arthralgias  Skin: Negative for color change and rash  Neurological: Positive for headaches  Negative for seizures and syncope  All other systems reviewed and are negative  Physical Exam  ED Triage Vitals [09/09/22 2325]   Temperature Pulse Respirations Blood Pressure SpO2   97 8 °F (36 6 °C) 65 18 150/63 96 %      Temp Source Heart Rate Source Patient Position - Orthostatic VS BP Location FiO2 (%)   Oral Monitor Lying Right arm --      Pain Score       7             Orthostatic Vital Signs  Vitals:    09/09/22 2325 09/10/22 0332   BP: 150/63 153/80   Pulse: 65 63   Patient Position - Orthostatic VS: Lying Sitting       Physical Exam  Vitals and nursing note reviewed  Constitutional:       General: She is not in acute distress  HENT:      Head: Normocephalic and atraumatic  Mouth/Throat:      Mouth: Mucous membranes are moist       Pharynx: Oropharynx is clear  Eyes:      General: No scleral icterus  Conjunctiva/sclera: Conjunctivae normal    Cardiovascular:      Rate and Rhythm: Normal rate and regular rhythm  Heart sounds: Normal heart sounds  Pulmonary:      Effort: Pulmonary effort is normal  No respiratory distress  Breath sounds: Normal breath sounds  No wheezing, rhonchi or rales  Abdominal:      General: Abdomen is flat  There is no distension  Tenderness: There is no abdominal tenderness  Musculoskeletal:         General: No signs of injury  Cervical back: Neck supple  Tenderness present  No rigidity  Thoracic back: Tenderness present  Lumbar back: Tenderness present  Comments: Patient has tenderness to palpation of her C-spine, T-spine, L-spine with decreased range of motion  Do not appreciate any neck rigidity     Skin:     General: Skin is warm       Coloration: Skin is not jaundiced  Findings: No erythema or rash  Neurological:      General: No focal deficit present  Mental Status: She is alert and oriented to person, place, and time  Mental status is at baseline  Cranial Nerves: No cranial nerve deficit  Sensory: No sensory deficit  Motor: No weakness  Coordination: Coordination normal    Psychiatric:         Mood and Affect: Mood normal          Behavior: Behavior normal          ED Medications  Medications   acetaminophen (TYLENOL) tablet 975 mg (975 mg Oral Given 9/10/22 0051)   oxyCODONE (ROXICODONE) IR tablet 5 mg (5 mg Oral Given 9/10/22 0332)   cephalexin (KEFLEX) capsule 500 mg (500 mg Oral Given 9/10/22 0351)       Diagnostic Studies  Results Reviewed     Procedure Component Value Units Date/Time    Urine Microscopic [[id number]]  (Abnormal) Collected: 09/10/22 0255    Lab Status: Final result Specimen: Urine Updated: 09/10/22 0322     RBC, UA 2-4 /hpf      WBC, UA Innumerable /hpf      Epithelial Cells Occasional /hpf      Bacteria, UA Occasional /hpf      WBC Clumps Present    Urine culture [[id number]] Collected: 09/10/22 0255    Lab Status: In process Specimen: Urine Updated: 09/10/22 0322    UA w Reflex to Microscopic w Reflex to Culture [[id number]]  (Abnormal) Collected: 09/10/22 0255    Lab Status: Final result Specimen: Urine Updated: 09/10/22 0311     Color, UA Light Yellow     Clarity, UA Turbid     Specific [place], UA 1 013     pH, UA 6 5     Leukocytes, UA Large     Nitrite, UA Positive     Protein, UA Trace mg/dl      Glucose, UA Negative mg/dl      Ketones, UA Negative mg/dl      Urobilinogen, UA <2 0 mg/dl      Bilirubin, UA Negative     Occult Blood, UA Trace                 CT head without contrast   Final Result by Lalita Zhou MD (09/10 2310)      No acute intracranial abnormality                    Workstation performed: [id number]         CT cervical spine without contrast   Final Result by Oralia Florence MD (09/10 0142)      No cervical spine fracture or traumatic malalignment  Workstation performed: [id number]         CT lumbar spine without contrast   Final Result by Oralia Florence MD (09/10 0208)      No evidence of acute traumatic injury throughout the lumbar spine  Multilevel degenerative changes as detailed above including left neural foraminal stenosis at the L2-L3 and L3-L4 levels and right neural foraminal stenosis at the L4-L5 level  Effacement of the left lateral recess at L3-L4 may result in impingement upon    the descending left L4 nerve root  Moderate canal stenosis at L4-L5    Workstation performed: [id number]         CT chest without contrast   Final Result by Oralia Florence MD (09/10 0151)      No evidence of acute traumatic injury throughout the chest      Fusiform ectasia of ascending thoracic aorta measuring 45 mm or less in greatest caliber  Recommendation is for follow-up low radiation dose noncontrast chest CT in one year  Workstation performed: [id number]         CT recon only thoracic spine (no charge)   Final Result by Oralia Florence MD (09/10 4040)      No fracture or traumatic subluxation  Workstation performed: [id number]               Procedures  Procedures      ED Course  ED Course as of 09/10/22 0456   Sat Sep 10, 2022   0309 No acute fractures seen on imaging, patient still complaining of pain, will give a dose of oxycodone in reassess, if patient unable ambulate will be admitted to the hospital   0411 Patient complaining of continued pain, was given oxycodone 45 minutes ago, will reassess did attempt to ambulate, should the patient not be able to ambulate will be admitted for treatment of ambulatory dysfunction    Patient also found to have a urinary tract infection, patient given a dose of Keflex                                       MDM  Number of Diagnoses or Management Options  Back pain: new and requires workup  Fall, initial encounter: new and requires workup  Injury of head, initial encounter: new and requires workup  Risk of Complications, Morbidity, and/or Mortality  General comments: Shirley Alfonso is a 80year old female presenting for evaluation after a mechanical fall in which she struck her head, she does not take any blood thinners  Patient is complaining of pain in her head, neck, chest, T and L-spines  No obvious signs of injury to the head, patient had tenderness to midline palpation C, T, and L-spine  She had tenderness to palpation of the left rib cage  No tenderness to palpation of her upper or lower extremities  No obvious deformities or injuries  Patient given Tylenol for pain  I had low suspicion for acute intracranial pathology  I had concern for possible compression fractures of the spine, possible rib fractures  Patient not complaining of dysuria hematuria, but concern for possible UTI  UA did show urinary tract infection, patient given a dose of Keflex here in the emergency department  No acute fractures seen on CT scans  Patient continued to complain of significant pain, patient given a dose of oxycodone without significant relief  Patient has difficulty with ambulation  Plan to admit for further treatment of her ambulatory dysfunction, pain, and urinary tract infection  Patient Progress  Patient progress: stable      Disposition  Final diagnoses:   Fall, initial encounter   Back pain   Injury of head, initial encounter   Ambulatory dysfunction     Time reflects when diagnosis was documented in both MDM as applicable and the Disposition within this note     Time User Action Codes Description Comment    9/10/2022  4:44 AM Santana Adrian Add [R37  HWHG] Fall, initial encounter     9/10/2022  4:44 AM Santana Adrian Add [M54 9] Back pain     9/10/2022  4:44 AM Santana Adrian Add [S09 90XA] Injury of head, initial encounter     9/10/2022  4:55 AM Santana Adrian Add [R26 2] Ambulatory dysfunction       ED Disposition     ED Disposition   Admit    Condition   Stable    Date/Time   Sat Sep 10, 2022  4:55 AM    Comment   Case was discussed with [facility] resident and the patient's admission status was agreed to be Admission Status: observation status to the service of Dr Allen Blind  Follow-up Information    None         Patient's Medications   Discharge Prescriptions    No medications on file     No discharge procedures on file  PDMP Review       Value Time User    PDMP Reviewed  Yes 10/5/2020  9:13 PM Lorenza Fischer DO           ED Provider  Attending physically available and evaluated Yaneth Dinh  BRE managed the patient along with the ED Attending      Electronically Signed by         Lisa Pak DO  09/10/22 8895

## 2022-09-10 NOTE — ASSESSMENT & PLAN NOTE
· Hx of dementia was noted  · Unclear if mental status is at baseline     · Will recheck with nursing home and her family

## 2022-09-10 NOTE — ASSESSMENT & PLAN NOTE
· Chronic back pain noted to have degenerative changes of the lumbar spine     · Start tylenol 975 q8H and lidoderm patch for the acute pain due to fall

## 2022-09-10 NOTE — H&P
[facility]  H&P- Yocasta Zamudio 1933, 80 y o  female MRN: [id number]  Unit/Bed#: ED-26 Encounter: [id number]  Primary Care Provider: Bessy Hernandez MD   Date and time admitted to hospital: 9/9/2022 10:58 PM    * [address]  · Presented with unwitnessed mechanical fall  Denied loss of consciousness  · UA showed positive for nitrite, bacteria, WBC  · CT scan of the head did not reveal intracranial hemorrhage  CT scan of the thoracic spine did not show fracture  CT scan of the lumbar spine did not show fracture but did show degenerative changes  CT scan of the chest showed fusiform ectasia of ascending thoracic aorta measuring 45 mm or less in greatest caliber  · Plan:  · Treat UTI with ceftriaxone  · PT OT eval and treat  · Pain control with tylenol 975 Q8H and lidoderm patch for her back and left chest wall pain    UTI (urinary tract infection)  Assessment & Plan  · Patient admitted to urinary frequency  · UA showed positive for nitrite, bacteria, WBC  · In the ED, received a dose of keflex  · Will switch to ceftriaxone for now  · Monitor CBC and fever curve  · Follow up urine culture    Ambulatory dysfunction  Assessment & Plan  · Baseline used a walker  · PT OT eval and treat    Dementia ([address] )  Assessment & Plan  · Hx of dementia was noted  · Unclear if mental status is at baseline     · Will recheck with nursing home and her family     BMI less than 19,adult  Assessment & Plan  · Encourage oral intake  · Dietary supplement as needed    RLS (restless legs syndrome)  Assessment & Plan  · Continue requip     Hypothyroidism  Assessment & Plan  · Continue levothyroxine 50 mg QD    Stage 4 chronic kidney disease [facility])  Assessment & Plan  Lab Results   Component Value Date    EGFR 36 10/08/2020    EGFR 30 10/06/2020    EGFR 27 10/05/2020    CREATININE 1 33 (H) 10/08/2020    CREATININE 1 56 (H) 10/06/2020    CREATININE 1 71 (H) 10/05/2020     Will recheck BMP Avoid nephrotoxins  Avoid hypotension  Monitor BMP    Back pain  Assessment & Plan  · Chronic back pain noted to have degenerative changes of the lumbar spine  · Start tylenol 975 q8H and lidoderm patch for the acute pain due to fall    VTE Pharmacologic Prophylaxis: VTE Score: 4 Moderate Risk (Score 3-4) - Pharmacological DVT Prophylaxis Ordered: heparin  Code Status: Level 3 - DNAR and DNI   Discussion with family: will attempt to contact family during the day  Anticipated Length of Stay: Patient will be admitted on an observation basis with an anticipated length of stay of less than 2 midnights secondary to fall  Chief Complaint: back pain and left sided chest wall pain    History of Present Illness:  Glenis Anderson is a 80 y o  female with a PMH of dementia, CKD stage 4, hypothyroidism, failure to thrive, who presents to the ED after a mechanical fall from [facility]  She was not a good historian so part of the history was obtained by chart reviewed  She was not able to tell me how she fell but did state she did not lose her consciousness  Per chart review, she sustained a mechanical fall when she slipped in the bathroom  She did hit her head but denied dizziness, lightheadedness,  Palpitation, muscle weakness, numbness or tingling  She complained severe back pain and chest wall pain after the fall  In the ED, UA showed positive for nitrite, WBC and bacteria  She reported urinary frequency but denied dysuria, suprapubic tenderness, fever or chills  CT studies did not intracranial bleeding or fractures  She will be admitted for ambulatory dysfunction and fall  Review of Systems:  Review of Systems   Constitutional: Negative for chills and fever  HENT: Negative for ear pain and sore throat  Eyes: Negative for pain and visual disturbance  Respiratory: Negative for cough and shortness of breath  Cardiovascular: Negative for chest pain and palpitations     Gastrointestinal: Negative for abdominal pain and vomiting  Genitourinary: Positive for frequency  Negative for difficulty urinating, dysuria, hematuria and urgency  Musculoskeletal: Positive for back pain  Negative for arthralgias  Skin: Negative for color change and rash  Neurological: Negative for dizziness, seizures, syncope, weakness, light-headedness and numbness  All other systems reviewed and are negative  Past Medical and Surgical History:   Past Medical History:   Diagnosis Date    Closed displaced fracture of acromial end of left clavicle with routine healing     Disease of thyroid gland     Endometrial [address] ([facility] [address] )     Hip pain, left     History of rib fracture     MCI (mild cognitive impairment)     Osteoarthritis of hand     Urinary frequency     Urinary incontinence        Past Surgical History:   Procedure Laterality Date    HYSTERECTOMY         Meds/Allergies:  Prior to Admission medications    Medication Sig Start Date End Date Taking?  Authorizing Provider   acetaminophen (TYLENOL) 325 mg tablet Take two tablets by mouth every 6 hours as needed    Historical Provider, MD   calcium carbonate (OS-CHLOÉ) 600 MG tablet Take 600 mg by mouth daily    Historical Provider, MD   denosumab (PROLIA) 60 mg/mL Inject under the skin every 6 (six) months 1/23/18   Historical Provider, MD   docusate sodium (COLACE) 100 mg capsule Take 100 mg by mouth daily    Historical Provider, MD   ferrous sulfate 325 (65 Fe) mg tablet Take 325 mg by mouth daily at bedtime    Historical Provider, MD   folic acid (FOLVITE) 092 mcg tablet Take 400 mcg by mouth daily    Historical Provider, MD   gabapentin (NEURONTIN) 100 mg capsule Take 100 mg by mouth daily at bedtime    Historical Provider, MD   levothyroxine 50 mcg tablet Take 50 mcg by mouth daily To take 1 p o  daily on an empty stomach    Historical Provider, MD   mirtazapine (REMERON) 15 mg tablet Take 7 5 mg by mouth daily at bedtime     Historical Provider, MD Multiple Vitamin (DAILY JAMES) TABS Take by mouth daily    Historical Provider, MD   Nutritional Supplements (ENSURE ACTIVE) LIQD Take by mouth daily    Historical Provider, MD   phenylephrine (JUNIOR-SYNEPHRINE) 1 % nasal spray Administer as needed    Historical Provider, MD   potassium citrate (UROCIT-K) 10 mEq Take 1 tablet by mouth 2 (two) times a day 6/3/20   Historical Provider, MD   rOPINIRole (REQUIP) 2 mg tablet Take by mouth 9/27/17   Historical Provider, MD   saccharomyces boulardii (FLORASTOR) 250 mg capsule Take 250 mg by mouth daily    Historical Provider, MD     I have reveiwed home medications using records provided by [facility]  Allergies: Allergies   Allergen Reactions    Asparagus - Food Allergy     Megestrol      Annotation - [id number]: Tasted bad  Refuses to take   Omeprazole Diarrhea     Annotation - [id number]: 08/30/16 Violet(daughter) report that patient experience diarrhea while on meds       Social History:  Marital Status:     Occupation: none  Patient Pre-hospital Living Situation: [facility]  Patient Pre-hospital Level of Mobility: walks with walker  Patient Pre-hospital Diet Restrictions: none  Substance Use History:   Social History     Substance and Sexual Activity   Alcohol Use Yes    Alcohol/week: 7 0 standard drinks    Types: 7 Shots of liquor per week     Social History     Tobacco Use   Smoking Status Current Every Day Smoker    Packs/day: 0 20    Types: Cigarettes   Smokeless Tobacco Never Used     Social History     Substance and Sexual Activity   Drug Use No       Family History:  Family History   Problem Relation Age of Onset    Breast cancer Mother     Diabetes Mother     Multiple sclerosis Father        Physical Exam:     Vitals:   Blood Pressure: 153/80 (09/10/22 0332)  Pulse: 63 (09/10/22 0332)  Temperature: 97 8 °F (36 6 °C) (09/09/22 2325)  Temp Source: Oral (09/09/22 2325)  Respirations: 18 (09/10/22 0332)  Weight - Scale: 47 kg (103 lb 9 9 oz) (09/09/22 2545)  SpO2: 95 % (09/10/22 0332)    Physical Exam  Vitals and nursing note reviewed  Constitutional:       Appearance: Normal appearance  HENT:      Head: Normocephalic and atraumatic  Nose: Nose normal  No rhinorrhea  Eyes:      Conjunctiva/sclera: Conjunctivae normal    Cardiovascular:      Rate and Rhythm: Normal rate and regular rhythm  Pulses: Normal pulses  Heart sounds: Normal heart sounds  Pulmonary:      Effort: Pulmonary effort is normal  No respiratory distress  Breath sounds: Normal breath sounds  Musculoskeletal:         General: Tenderness (left chest wall and lower back) present  Right lower leg: No edema  Left lower leg: No edema  Skin:     General: Skin is warm and dry  Coloration: Skin is not jaundiced  Neurological:      Mental Status: She is alert and oriented to person, place, and time  Psychiatric:         Mood and Affect: Mood normal          Behavior: Behavior normal          Additional Data:     Lab Results:                            Imaging: Reviewed radiology reports from this admission including: chest CT scan, CT head and CT of the thoracic spine, and lumbar spine  CT head without contrast   Final Result by Shelby Elizabeth MD (09/10 6491)      No acute intracranial abnormality  Workstation performed: [id number]         CT cervical spine without contrast   Final Result by Shelby Elizabeth MD (09/10 0142)      No cervical spine fracture or traumatic malalignment  Workstation performed: [id number]         CT lumbar spine without contrast   Final Result by Shelby Elizabeth MD (09/10 0201)      No evidence of acute traumatic injury throughout the lumbar spine  Multilevel degenerative changes as detailed above including left neural foraminal stenosis at the L2-L3 and L3-L4 levels and right neural foraminal stenosis at the L4-L5 level   Effacement of the left lateral recess at L3-L4 may result in impingement upon    the descending left L4 nerve root  Moderate canal stenosis at L4-L5    Workstation performed: [id number]         CT chest without contrast   Final Result by Carmina Norwood MD (09/10 0151)      No evidence of acute traumatic injury throughout the chest      Fusiform ectasia of ascending thoracic aorta measuring 45 mm or less in greatest caliber  Recommendation is for follow-up low radiation dose noncontrast chest CT in one year  Workstation performed: [id number]         CT recon only thoracic spine (no charge)   Final Result by Carmina Norwood MD (09/10 4244)      No fracture or traumatic subluxation  Workstation performed: [id number]             EKG and Other Studies Reviewed on Admission:   · EKG: No EKG obtained  Physical Activity Assessment and Intervention:    Activity journal completion recommended      Emotional and Mental Well-being, Sleep, Connectedness Assessment and Intervention:    Depression and anxiety screening performed and reviewed      Tobacco and Toxic Substance Assessment and Intervention:     Tobacco use screening performed    Alcohol and drug use screening performed      ** Please Note: This note has been constructed using a voice recognition system   **

## 2022-09-10 NOTE — ASSESSMENT & PLAN NOTE
Lab Results   Component Value Date    EGFR 36 10/08/2020    EGFR 30 10/06/2020    EGFR 27 10/05/2020    CREATININE 1 33 (H) 10/08/2020    CREATININE 1 56 (H) 10/06/2020    CREATININE 1 71 (H) 10/05/2020     Will recheck BMP   Avoid nephrotoxins  Avoid hypotension  Monitor BMP

## 2022-09-11 LAB
ANION GAP SERPL CALCULATED.3IONS-SCNC: 7 MMOL/L (ref 4–13)
BUN SERPL-MCNC: 26 MG/DL (ref 5–25)
CALCIUM SERPL-MCNC: 9.1 MG/DL (ref 8.4–10.2)
CHLORIDE SERPL-SCNC: 112 MMOL/L (ref 96–108)
CO2 SERPL-SCNC: 21 MMOL/L (ref 21–32)
CREAT SERPL-MCNC: 1.07 MG/DL (ref 0.6–1.3)
GFR SERPL CREATININE-BSD FRML MDRD: 46 ML/MIN/1.73SQ M
GLUCOSE SERPL-MCNC: 98 MG/DL (ref 65–140)
POTASSIUM SERPL-SCNC: 4.2 MMOL/L (ref 3.5–5.3)
SODIUM SERPL-SCNC: 140 MMOL/L (ref 135–147)

## 2022-09-11 PROCEDURE — 80048 BASIC METABOLIC PNL TOTAL CA: CPT | Performed by: INTERNAL MEDICINE

## 2022-09-11 PROCEDURE — 99232 SBSQ HOSP IP/OBS MODERATE 35: CPT | Performed by: INTERNAL MEDICINE

## 2022-09-11 RX ORDER — CEFDINIR 300 MG/1
300 CAPSULE ORAL EVERY 24 HOURS
Status: COMPLETED | OUTPATIENT
Start: 2022-09-12 | End: 2022-09-12

## 2022-09-11 RX ADMIN — HEPARIN SODIUM 5000 UNITS: 5000 INJECTION INTRAVENOUS; SUBCUTANEOUS at 05:12

## 2022-09-11 RX ADMIN — POTASSIUM CITRATE 10 MEQ: 10 TABLET, EXTENDED RELEASE ORAL at 17:00

## 2022-09-11 RX ADMIN — LEVOTHYROXINE SODIUM 50 MCG: 50 TABLET ORAL at 05:11

## 2022-09-11 RX ADMIN — SODIUM CHLORIDE 100 ML/HR: 0.9 INJECTION, SOLUTION INTRAVENOUS at 05:16

## 2022-09-11 RX ADMIN — B-COMPLEX W/ C & FOLIC ACID TAB 1 TABLET: TAB at 08:41

## 2022-09-11 RX ADMIN — SENNOSIDES AND DOCUSATE SODIUM 1 TABLET: 50; 8.6 TABLET ORAL at 08:40

## 2022-09-11 RX ADMIN — FERROUS SULFATE TAB 325 MG (65 MG ELEMENTAL FE) 325 MG: 325 (65 FE) TAB at 21:17

## 2022-09-11 RX ADMIN — LIDOCAINE 5% 2 PATCH: 700 PATCH TOPICAL at 08:39

## 2022-09-11 RX ADMIN — Medication 250 MG: at 08:39

## 2022-09-11 RX ADMIN — CEFTRIAXONE 1000 MG: 1 INJECTION, SOLUTION INTRAVENOUS at 08:41

## 2022-09-11 RX ADMIN — ROPINIROLE 1 MG: 1 TABLET, FILM COATED ORAL at 08:39

## 2022-09-11 RX ADMIN — ACETAMINOPHEN 975 MG: 325 TABLET, FILM COATED ORAL at 08:39

## 2022-09-11 RX ADMIN — POTASSIUM CITRATE 10 MEQ: 10 TABLET, EXTENDED RELEASE ORAL at 08:41

## 2022-09-11 RX ADMIN — ACETAMINOPHEN 975 MG: 325 TABLET, FILM COATED ORAL at 16:57

## 2022-09-11 RX ADMIN — FOLIC ACID TAB 400 MCG 400 MCG: 400 TAB at 08:41

## 2022-09-11 RX ADMIN — MIRTAZAPINE 7.5 MG: 15 TABLET, FILM COATED ORAL at 21:17

## 2022-09-11 RX ADMIN — HEPARIN SODIUM 5000 UNITS: 5000 INJECTION INTRAVENOUS; SUBCUTANEOUS at 21:17

## 2022-09-11 RX ADMIN — GABAPENTIN 100 MG: 100 CAPSULE ORAL at 21:17

## 2022-09-11 RX ADMIN — CALCIUM 1 TABLET: 500 TABLET ORAL at 08:40

## 2022-09-11 NOTE — CASE MANAGEMENT
Case Management Discharge Planning Note    Patient name Blane Redd  Location S /S -31 MRN [id number]  : 1933 Date 2022       Current Admission Date: 2022  Current Admission Diagnosis:Fall   Patient Active Problem List    Diagnosis Date Noted    Fall 09/10/2022    Swollen finger 2020    CRF (chronic renal failure), stage 3 (moderate) ([address] ) 10/09/2020    Goals of care, counseling/discussion 10/09/2020    Mild protein-calorie malnutrition ([address] ) 10/07/2020    Physician orders for life-sustaining treatment (POLST) form indicates patient wish for do-not-resuscitate status 10/05/2020    Dementia ([address] ) 10/05/2020    Closed fracture of olecranon process of left ulna 10/05/2020    Blood in stool 2020    History of laceration of skin 2020    Peripheral edema 05/15/2020    Heel pain, bilateral 2019    UTI (urinary tract infection) 2019    Acute kidney injury superimposed on chronic kidney disease ([address] ) 2019    MCI (mild cognitive impairment) 2019    BMI less than 19,adult 2019    Neck pain 2019    Pressure injury of sacral region, stage 2 ([address] ) 2019    Carpal tunnel syndrome 2019    Dermatitis associated with moisture 2018    Numbness of left hand 2018    Hoarseness or changing voice 10/29/2018    Impacted cerumen of left ear 10/29/2018    Physical deconditioning 2017    Anemia 2017    Ambulatory dysfunction 2017    Diarrhea 2016    Mixed incontinence 10/07/2015    Severe protein-calorie malnutrition ([address] ) 10/07/2015    Back pain 2015    GERD (gastroesophageal reflux disease) 2015    Stage 4 chronic kidney disease ([address] ) 2014    Cognitive impairment 2014    Hearing loss 2014    Constipation 2014    Depression 2014    Hypothyroidism 2014    Osteoporosis 2014    Osteoarthritis 2014    RLS (restless legs syndrome) 11/26/2014    First degree atrioventricular block 08/05/2013      LOS (days): 0  Geometric Mean LOS (GMLOS) (days):   Days to GMLOS:     OBJECTIVE:            Current admission status: Observation   Preferred Pharmacy:   [facility] - [address] Maryellen [place]TI [address] [facility]  Phone: [phone] Fax: [phone]    Primary Care Provider: Catrina Blizzard, MD    Primary Insurance: MEDICARE  Secondary Insurance: [employer]    DISCHARGE DETAILS:    Discharge planning discussed with[de-identified] Patient & Jessica-daughter  Freedom of Choice: Yes  Comments - Freedom of Choice: Pt confirmed being a resident of [facility]  Pt and [facility] reported being agreeable to the recommendation of SNF, and requested a referral to [facility]  CM discussed freedom  of choice and made the requested referral   CM contacted family/caregiver?: Yes             Contacts  Patient Contacts: [facility]  Relationship to Patient[de-identified] Family  Contact Method: In Person  Reason/Outcome: Continuity of Care, Referral    Requested [address]         Is the patient interested in [facility] at discharge?: No    DME Referral Provided  Referral made for DME?: No    Other Referral/Resources/Interventions Provided:  Interventions: SNF  Referral Comments: SNF referral made to [facility]           Treatment Team Recommendation: Short Term Rehab  Discharge Destination Plan[de-identified] Short Term Rehab

## 2022-09-11 NOTE — PROGRESS NOTES
Pt is confused and removed IV  Per team, pt will be discharged back to [facility]  RN spoke to Dr Vita Shay  Antibiotics will be changed to PO  Okay to leave IV site out  Will continue to monitor

## 2022-09-11 NOTE — ASSESSMENT & PLAN NOTE
· Patient admitted to urinary frequency  · UA showed positive for nitrite, bacteria, WBC  · Urine culture growing Gram-negative enteric rods  · Finished 2 days of ceftriaxone, will start oral cefdinir    Patient lost IV access

## 2022-09-11 NOTE — ASSESSMENT & PLAN NOTE
· Presented with unwitnessed mechanical fall  Denied loss of consciousness  · UA showed positive for nitrite, bacteria, WBC  · CT scan of the head did not reveal intracranial hemorrhage  CT scan of the thoracic spine did not show fracture  CT scan of the lumbar spine did not show fracture but did show degenerative changes  CT scan of the chest showed fusiform ectasia of ascending thoracic aorta measuring 45 mm or less in greatest caliber    · PT OT recommendations for post acute rehab- needs placement

## 2022-09-11 NOTE — PROGRESS NOTES
[facility]  Progress Note - Milderd Medin 1933, 80 y o  female MRN: [id number]  Unit/Bed#: S -01 Encounter: [id number]  Primary Care Provider: Prasanna Beltran MD   Date and time admitted to hospital: 9/9/2022 10:58 PM    * [address]  · Presented with unwitnessed mechanical fall  Denied loss of consciousness  · UA showed positive for nitrite, bacteria, WBC  · CT scan of the head did not reveal intracranial hemorrhage  CT scan of the thoracic spine did not show fracture  CT scan of the lumbar spine did not show fracture but did show degenerative changes  CT scan of the chest showed fusiform ectasia of ascending thoracic aorta measuring 45 mm or less in greatest caliber  · PT OT recommendations for post acute rehab- needs placement      UTI (urinary tract infection)  Assessment & Plan  · Patient admitted to urinary frequency  · UA showed positive for nitrite, bacteria, WBC  · Urine culture growing Gram-negative enteric rods  · Finished 2 days of ceftriaxone, will start oral cefdinir  Patient lost IV access    Dementia [facility])  Assessment & Plan  · Hx of dementia was noted  BMI less than 19,adult  Assessment & Plan  · Encourage oral intake  · Dietary supplement as needed    RLS (restless legs syndrome)  Assessment & Plan  · Continue requip     Hypothyroidism  Assessment & Plan  · Continue levothyroxine 50 mg QD    Stage 4 chronic kidney disease (HCC)  Assessment & Plan  Creatinine back to baseline  Avoid nephrotoxins  Avoid hypotension  Monitor BMP    Back pain  Assessment & Plan  · Chronic back pain noted to have degenerative changes of the lumbar spine     · Start tylenol 975 q8H and lidoderm patch for the acute pain due to fall    Ambulatory dysfunction  Assessment & Plan  · Baseline used a walker  · PT OT eval and treat      VTE Pharmacologic Prophylaxis:   VTE Score: 4 Moderate Risk (Score 3-4) - Pharmacological DVT Prophylaxis Ordered: Heparin  Mechanical VTE Prophylaxis in Place: No    Patient Centered Rounds: I have performed bedside rounds with nursing staff today  Discussions with Specialists or Other Care Team Provider:  Nursing    Education and Discussions with Family / Patient: Updated  (daughter) via phone  Current Length of Stay: 0 day(s)    Current Patient Status: Inpatient     Discharge Plan / Estimated Discharge Date: Anticipate discharge tomorrow to rehab facility  Code Status: Level 3 - DNAR and DNI      Subjective:   Patient is confused, alert  Denies any pain  Objective:     Vitals:   Temp (24hrs), Av 4 °F (36 9 °C), Min:98 2 °F (36 8 °C), Max:98 6 °F (37 °C)    Temp:  [98 2 °F (36 8 °C)-98 6 °F (37 °C)] 98 2 °F (36 8 °C)  HR:  [71-74] 74  Resp:  [16-18] 18  BP: ()/(65-79) 96/65  SpO2:  [94 %-95 %] 94 %  Body mass index is 17 93 kg/m²  Input and Output Summary (last 24 hours): Intake/Output Summary (Last 24 hours) at 2022 1454  Last data filed at 9/10/2022 2112  Gross per 24 hour   Intake --   Output 700 ml   Net -700 ml       Physical Exam:     Physical Exam  Vitals and nursing note reviewed  Constitutional:       General: She is not in acute distress  Appearance: She is well-developed  HENT:      Head: Normocephalic and atraumatic  Mouth/Throat:      Mouth: Mucous membranes are dry  Pharynx: Oropharynx is clear  Eyes:      Conjunctiva/sclera: Conjunctivae normal    Cardiovascular:      Rate and Rhythm: Normal rate  Heart sounds: Murmur heard  Pulmonary:      Effort: Pulmonary effort is normal  No respiratory distress  Breath sounds: Normal breath sounds  Abdominal:      Palpations: Abdomen is soft  Tenderness: There is no abdominal tenderness  Musculoskeletal:      Cervical back: Neck supple  Skin:     General: Skin is warm and dry  Capillary Refill: Capillary refill takes 2 to 3 seconds     Neurological:      Mental Status: She is alert  She is disoriented  Additional Data:     Labs:  Results from last 7 days   Lab Units 09/10/22  0618   WBC Thousand/uL 8 78   HEMOGLOBIN g/dL 14 6   HEMATOCRIT % 44 7   PLATELETS Thousands/uL 198   NEUTROS PCT % 70   LYMPHS PCT % 14   MONOS PCT % 12   EOS PCT % 2     Results from last 7 days   Lab Units 09/11/22  1052   SODIUM mmol/L 140   POTASSIUM mmol/L 4 2   CHLORIDE mmol/L 112*   CO2 mmol/L 21   BUN mg/dL 26*   CREATININE mg/dL 1 07   ANION GAP mmol/L 7   CALCIUM mg/dL 9 1   GLUCOSE RANDOM mg/dL 98                       Imaging: No pertinent imaging reviewed      Recent Cultures (last 7 days):     Results from last 7 days   Lab Units 09/10/22  0255   URINE CULTURE  >100,000 cfu/ml Gram Negative Lion Enteric Like*       Lines/Drains:  Invasive Devices  Report    None                 Telemetry:        Last 24 Hours Medication List:   Current Facility-Administered Medications   Medication Dose Route Frequency Provider Last Rate    acetaminophen  975 mg Oral Q8H Lars Villalta MD      calcium carbonate  1 tablet Oral Daily With Breakfast Lars Villalta MD      [START ON 9/12/2022] cefdinir  300 mg Oral Q24H Angela Pryor MD      ferrous sulfate  325 mg Oral HS Lars Villalta MD      folic acid  200 mcg Oral Daily Lars Villalta MD      gabapentin  100 mg Oral HS Lars Villalta MD      heparin (porcine)  5,000 Units Subcutaneous [facility] Lars Villalta MD      levothyroxine  50 mcg Oral Early Morning Lars Villalta MD      lidocaine  2 patch Topical Daily Lars Villalta MD      mirtazapine  7 5 mg Oral HS Lars Villalta MD      multivitamin stress formula  1 tablet Oral Daily Lars Villalta MD      ondansetron  4 mg Intravenous Q6H PRN Lars Villalta MD      oxyCODONE  2 5 mg Oral Q6H PRN Edwina Garcia MD      potassium citrate  10 mEq Oral BID Lars Villalta MD      rOPINIRole  1 mg Oral Daily Lars Phillips MD      saccharomyces boulardii  250 mg Oral Daily Lars Phillips MD      senna-docusate sodium  1 tablet Oral Daily Lars Phillips MD          Today, Patient Was Seen By: Che Valenzuela MD    ** Please Note: This note has been constructed using a voice recognition system   **

## 2022-09-12 VITALS
HEART RATE: 70 BPM | WEIGHT: 103.62 LBS | RESPIRATION RATE: 16 BRPM | OXYGEN SATURATION: 95 % | BODY MASS INDEX: 17.93 KG/M2 | TEMPERATURE: 97.9 F | SYSTOLIC BLOOD PRESSURE: 138 MMHG | DIASTOLIC BLOOD PRESSURE: 62 MMHG

## 2022-09-12 LAB
BACTERIA UR CULT: ABNORMAL
FLUAV RNA RESP QL NAA+PROBE: NEGATIVE
FLUBV RNA RESP QL NAA+PROBE: NEGATIVE
RSV RNA RESP QL NAA+PROBE: NEGATIVE
SARS-COV-2 RNA RESP QL NAA+PROBE: NEGATIVE

## 2022-09-12 PROCEDURE — 0241U HB NFCT DS VIR RESP RNA 4 TRGT: CPT | Performed by: INTERNAL MEDICINE

## 2022-09-12 PROCEDURE — 99239 HOSP IP/OBS DSCHRG MGMT >30: CPT | Performed by: INTERNAL MEDICINE

## 2022-09-12 RX ADMIN — ACETAMINOPHEN 975 MG: 325 TABLET, FILM COATED ORAL at 03:34

## 2022-09-12 RX ADMIN — HEPARIN SODIUM 5000 UNITS: 5000 INJECTION INTRAVENOUS; SUBCUTANEOUS at 06:21

## 2022-09-12 RX ADMIN — SENNOSIDES AND DOCUSATE SODIUM 1 TABLET: 50; 8.6 TABLET ORAL at 08:12

## 2022-09-12 RX ADMIN — ROPINIROLE 1 MG: 1 TABLET, FILM COATED ORAL at 08:12

## 2022-09-12 RX ADMIN — POTASSIUM CITRATE 10 MEQ: 10 TABLET, EXTENDED RELEASE ORAL at 08:14

## 2022-09-12 RX ADMIN — OXYCODONE HYDROCHLORIDE 2.5 MG: 5 TABLET ORAL at 14:27

## 2022-09-12 RX ADMIN — HEPARIN SODIUM 5000 UNITS: 5000 INJECTION INTRAVENOUS; SUBCUTANEOUS at 14:27

## 2022-09-12 RX ADMIN — LEVOTHYROXINE SODIUM 50 MCG: 50 TABLET ORAL at 06:21

## 2022-09-12 RX ADMIN — B-COMPLEX W/ C & FOLIC ACID TAB 1 TABLET: TAB at 08:12

## 2022-09-12 RX ADMIN — CALCIUM 1 TABLET: 500 TABLET ORAL at 08:12

## 2022-09-12 RX ADMIN — Medication 250 MG: at 08:12

## 2022-09-12 RX ADMIN — LIDOCAINE 5% 2 PATCH: 700 PATCH TOPICAL at 08:13

## 2022-09-12 RX ADMIN — ACETAMINOPHEN 975 MG: 325 TABLET, FILM COATED ORAL at 08:14

## 2022-09-12 RX ADMIN — FOLIC ACID TAB 400 MCG 400 MCG: 400 TAB at 08:14

## 2022-09-12 RX ADMIN — CEFDINIR 300 MG: 300 CAPSULE ORAL at 08:12

## 2022-09-12 NOTE — ASSESSMENT & PLAN NOTE
· Patient admitted to urinary frequency  · UA showed positive for nitrite, bacteria, WBC    · Urine culture growing E coli pansensitive  · Patient finished 3 days of antibiotics

## 2022-09-12 NOTE — ASSESSMENT & PLAN NOTE
· Chronic back pain noted to have degenerative changes of the lumbar spine     · Patient had chronic rib fractures  · Start tylenol 975 q8H and lidoderm patch for the acute pain due to fall

## 2022-09-12 NOTE — CASE MANAGEMENT
Case Management Discharge Planning Note    Patient name Danielle Masterson  Location S /S -62 MRN [id number]  : 1933 Date 2022       Current Admission Date: 2022  Current Admission Diagnosis:Fall   Patient Active Problem List    Diagnosis Date Noted    Fall 09/10/2022    Swollen finger 2020    CRF (chronic renal failure), stage 3 (moderate) ([address] ) 10/09/2020    Goals of care, counseling/discussion 10/09/2020    Mild protein-calorie malnutrition ([address] ) 10/07/2020    Physician orders for life-sustaining treatment (POLST) form indicates patient wish for do-not-resuscitate status 10/05/2020    Dementia ([address] ) 10/05/2020    Closed fracture of olecranon process of left ulna 10/05/2020    Blood in stool 2020    History of laceration of skin 2020    Peripheral edema 05/15/2020    Heel pain, bilateral 2019    UTI (urinary tract infection) 2019    Acute kidney injury superimposed on chronic kidney disease ([address] ) 2019    MCI (mild cognitive impairment) 2019    BMI less than 19,adult 2019    Neck pain 2019    Pressure injury of sacral region, stage 2 ([address] ) 2019    Carpal tunnel syndrome 2019    Dermatitis associated with moisture 2018    Numbness of left hand 2018    Hoarseness or changing voice 10/29/2018    Impacted cerumen of left ear 10/29/2018    Physical deconditioning 2017    Anemia 2017    Ambulatory dysfunction 2017    Diarrhea 2016    Mixed incontinence 10/07/2015    Severe protein-calorie malnutrition ([address] ) 10/07/2015    Back pain 2015    GERD (gastroesophageal reflux disease) 2015    Stage 4 chronic kidney disease ([address] ) 2014    Cognitive impairment 2014    Hearing loss 2014    Constipation 2014    Depression 2014    Hypothyroidism 2014    Osteoporosis 2014    Osteoarthritis 2014    RLS (restless legs syndrome) 11/26/2014    First degree atrioventricular block 08/05/2013      LOS (days): 1  Geometric Mean LOS (GMLOS) (days):   Days to GMLOS:     OBJECTIVE:  Risk of Unplanned Readmission Score: 11 92         Current admission status: Inpatient   Preferred Pharmacy:   [address] [facility] [address] [place] [address]  Phone: [phone] Fax: [phone]    Primary Care Provider: Kacey Garrett MD    Primary Insurance: MEDICARE  Secondary Insurance: [facility]    DISCHARGE DETAILS:       Contacts  Patient Contacts: Juhi Dillards - daughter  Relationship to Patient[de-identified] Family  Contact Method: Phone  Reason/Outcome: Continuity of Care, Emergency Contact, Discharge Planning        Transport at Discharge : [address] [place] [address] by [employer] and Unit #): [facility]  ETA of Transport (Date): 09/12/22  ETA of Transport (Time): 1500     Transfer Mode: Wheelchair  Accompanied by:  Other (Comment) ([facility] staff)      Accepting Facility Name, [address] : [facility]  Receiving Facility/Agency Phone Number: [phone]  Facility/Agency Fax Number: [phone]

## 2022-09-12 NOTE — PLAN OF CARE
Problem: Potential for Falls  Goal: Patient will remain free of falls  Description: INTERVENTIONS:  - Educate patient/family on patient safety including physical limitations  - Instruct patient to call for assistance with activity   - Consult OT/PT to assist with strengthening/mobility   - Keep Call bell within reach  - Keep bed low and locked with side rails adjusted as appropriate  - Keep care items and personal belongings within reach  - Initiate and maintain comfort rounds  - Make Fall Risk Sign visible to staff  - Apply yellow socks and bracelet for high fall risk patients  - Consider moving patient to room near nurses station  Outcome: Progressing     Problem: MOBILITY - ADULT  Goal: Maintain or return to baseline ADL function  Description: INTERVENTIONS:  -  Assess patient's ability to carry out ADLs; assess patient's baseline for ADL function and identify physical deficits which impact ability to perform ADLs (bathing, care of mouth/teeth, toileting, grooming, dressing, etc )  - Assess/evaluate cause of self-care deficits   - Assess range of motion  - Assess patient's mobility; develop plan if impaired  - Assess patient's need for assistive devices and provide as appropriate  - Encourage maximum independence but intervene and supervise when necessary  - Involve family in performance of ADLs  - Assess for home care needs following discharge   - Consider OT consult to assist with ADL evaluation and planning for discharge  - Provide patient education as appropriate  Outcome: Progressing  Goal: Maintains/Returns to pre admission functional level  Description: INTERVENTIONS:  - Perform BMAT or MOVE assessment daily    - Set and communicate daily mobility goal to care team and patient/family/caregiver     - Collaborate with rehabilitation services on mobility goals if consulted  - Perform Range of Motion   - Reposition patient   - Dangle patient  - Stand patient   - Ambulate patient   - Out of bed to chair  - Out of bed for meals   - Out of bed for toileting  - Record patient progress and toleration of activity level   Outcome: Progressing

## 2022-09-12 NOTE — DISCHARGE SUMMARY
[facility]  Discharge- Joanne Roman 1933, 80 y o  female MRN: [id number]  Unit/Bed#: S -01 Encounter: [id number]  Primary Care Provider: Willis Christiansen MD   Date and time admitted to hospital: 9/9/2022 10:58 PM    * [address]  · Presented with unwitnessed mechanical fall  Denied loss of consciousness  · UA showed positive for nitrite, bacteria, WBC  · CT scan of the head did not reveal intracranial hemorrhage  CT scan of the thoracic spine did not show fracture  CT scan of the lumbar spine did not show fracture but did show degenerative changes  CT scan of the chest showed fusiform ectasia of ascending thoracic aorta measuring 45 mm or less in greatest caliber  · PT OT recommendations for post acute rehab- needs placement, discharging to rehab facility      UTI (urinary tract infection)  Assessment & Plan  · Patient admitted to urinary frequency  · UA showed positive for nitrite, bacteria, WBC  · Urine culture growing E coli pansensitive  · Patient finished 3 days of antibiotics    Dementia ([facility] [address] )  Assessment & Plan  · Hx of dementia was noted  BMI less than 19,adult  Assessment & Plan  · Encourage oral intake  · Dietary supplement as needed    RLS (restless legs syndrome)  Assessment & Plan  · Continue requip     Hypothyroidism  Assessment & Plan  · Continue levothyroxine 50 mg QD    Stage 4 chronic kidney disease (HCC)  Assessment & Plan  Creatinine back to baseline      Back pain  Assessment & Plan  · Chronic back pain noted to have degenerative changes of the lumbar spine     · Patient had chronic rib fractures  · Start tylenol 975 q8H and lidoderm patch for the acute pain due to fall    Ambulatory dysfunction  Assessment & Plan  · Baseline used a walker  · PT OT eval recommending rehab      Discharging Resident: Mainor Almonte MD  Attending: No att  providers found  PCP: Willis Christiansen MD  Admission Date: 9/9/2022  Discharge Date: 09/12/22    Disposition:      Short Term Rehab or [facility] [address] SNF:   · [facility] -     Reason for Admission:  [address] Stay:  · None    Procedures Performed:     · None    Medication Adjustments and Discharge Medications:  · Summary of Medication Adjustments made as a result of this hospitalization:  As in AVS  · Medication Dosing Tapers - Please refer to Discharge Medication List for details on any medication dosing tapers (if applicable to patient)  · Medications being temporarily held (include recommended restart time):  None  · Discharge Medication List: See after visit summary for reconciled discharge medications  Wound Care Recommendations:  When applicable, please see wound care section of After Visit Summary  Diet Recommendations at Discharge:  Diet -   Fluid Restriction - No Fluid Restriction at Discharge  Instructions for any Catheters / Lines Present at Discharge (including removal date, if applicable):  None    Significant Findings / Test Results:     CT head without contrast    Result Date: 9/10/2022  Impression: No acute intracranial abnormality  CT chest without contrast    Result Date: 9/10/2022  Impression: No evidence of acute traumatic injury throughout the chest Fusiform ectasia of ascending thoracic aorta measuring 45 mm or less in greatest caliber  Recommendation is for follow-up low radiation dose noncontrast chest CT in one year  CT cervical spine without contrast    Result Date: 9/10/2022  Impression: No cervical spine fracture or traumatic malalignment  Workstation performed: [id number]     CT lumbar spine without contrast    Result Date: 9/10/2022  Impression: No evidence of acute traumatic injury throughout the lumbar spine   Multilevel degenerative changes as detailed above including left neural foraminal stenosis at the L2-L3 and L3-L4 levels and right neural foraminal stenosis at the L4-L5 level  Effacement of the left lateral recess at L3-L4 may result in impingement upon  the descending left L4 nerve root  Moderate canal stenosis at L4-L5    CT recon only thoracic spine (no charge)    Result Date: 9/10/2022  Impression: No fracture or traumatic subluxation  · No Chest XR results available for this patient  Incidental Findings:   · Fusiform ectasia of ascending thoracic aorta 45 mm  Recommendation for follow-up low radiation noncontrast chest CT in 1 year    Test Results Pending at Discharge (will require follow up): · None     Outpatient Tests Requested:  · None    Complications:  None    Hospital Course:     Yomi Duval is a 80 y o  female patient with past medical history of CKD stage 4, dementia, hypothyroidism who originally presented to the hospital on 9/9/2022 due to fall  Patient reported urinary incontinence  Patient was initiated on empiric antibiotics for suspected UTI  PT and OT evaluation recommendations for post acute rehab  Patient had fall and delirium precautions during hospitalization  Urine culture grew Gram-negative E coli pansensitive  Patient received 3 days of Antibiotics  Patient is discharged to [facility] for rehab    Condition at Discharge: stable     Discharge Day Visit / Exam:     Subjective:  Complains of back pain  States chronic  CT reviewed showed chronic rib fractures  Patient was having lidocaine patch  Vitals: Blood Pressure: 138/62 (09/12/22 0719)  Pulse: 70 (09/12/22 0719)  Temperature: 97 9 °F (36 6 °C) (09/12/22 0719)  Temp Source: Oral (09/11/22 0700)  Respirations: 16 (09/12/22 0719)  Weight - Scale: 47 kg (103 lb 9 9 oz) (09/09/22 2325)  SpO2: 95 % (09/12/22 0719)  Exam:   Physical Exam  Constitutional:       Comments: Appears thin, frail   HENT:      Head: Normocephalic and atraumatic        Nose: Nose normal       Comments: Skin integrity compromised on the bridge of the nose on right side Mouth/Throat:      Mouth: Mucous membranes are dry  Pharynx: Oropharynx is clear  Eyes:      Extraocular Movements: Extraocular movements intact  Conjunctiva/sclera: Conjunctivae normal    Cardiovascular:      Rate and Rhythm: Normal rate and regular rhythm  Pulses: Normal pulses  Heart sounds: Murmur heard  Pulmonary:      Effort: Pulmonary effort is normal       Breath sounds: Normal breath sounds  Abdominal:      General: Bowel sounds are normal       Palpations: Abdomen is soft  Musculoskeletal:      Cervical back: Normal range of motion and neck supple  Skin:     General: Skin is warm and dry  Capillary Refill: Capillary refill takes 2 to 3 seconds  Neurological:      Mental Status: She is alert  Mental status is at baseline  Discussion with Family:  Called daughter and updated her    Goals of Care Discussions:  · Code Status at Discharge: Prior  · Were there any Goals of Care Discussions during Hospitalization?: No  · Results of any General Goals of Care Discussions:  None   · POLST Completed: No   · If POLST Completed, Summary of POLST Agreement Provided Here: N  · OK to Rehospitalize if Needed? Yes    Discharge instructions/Information to patient and family:   See after visit summary section titled Discharge Instructions for information provided to patient and family        Planned Readmission:  None      ** Please Note: This note has been constructed using a voice recognition system **

## 2022-09-12 NOTE — ASSESSMENT & PLAN NOTE
· Presented with unwitnessed mechanical fall  Denied loss of consciousness  · UA showed positive for nitrite, bacteria, WBC  · CT scan of the head did not reveal intracranial hemorrhage  CT scan of the thoracic spine did not show fracture  CT scan of the lumbar spine did not show fracture but did show degenerative changes  CT scan of the chest showed fusiform ectasia of ascending thoracic aorta measuring 45 mm or less in greatest caliber    · PT OT recommendations for post acute rehab- needs placement, discharging to rehab facility

## 2022-09-20 ENCOUNTER — HOSPITAL ENCOUNTER (INPATIENT)
Facility: HOSPITAL | Age: 87
LOS: 7 days | Discharge: NURSING, REHAB OR LONG-TERM CARE | DRG: 871 | End: 2022-09-27
Attending: EMERGENCY MEDICINE | Admitting: INTERNAL MEDICINE
Payer: MEDICARE

## 2022-09-20 ENCOUNTER — TELEPHONE (OUTPATIENT)
Dept: CT IMAGING | Facility: HOSPITAL | Age: 87
End: 2022-09-20

## 2022-09-20 ENCOUNTER — APPOINTMENT (EMERGENCY)
Dept: CT IMAGING | Facility: HOSPITAL | Age: 87
DRG: 871 | End: 2022-09-20
Payer: MEDICARE

## 2022-09-20 DIAGNOSIS — A04.72 C. DIFFICILE COLITIS: ICD-10-CM

## 2022-09-20 DIAGNOSIS — A41.9 SEPSIS (HCC): ICD-10-CM

## 2022-09-20 DIAGNOSIS — R13.10 DYSPHAGIA: ICD-10-CM

## 2022-09-20 DIAGNOSIS — N17.9 AKI (ACUTE KIDNEY INJURY) (HCC): ICD-10-CM

## 2022-09-20 DIAGNOSIS — K52.9 COLITIS: Primary | ICD-10-CM

## 2022-09-20 DIAGNOSIS — K21.9 GASTROESOPHAGEAL REFLUX DISEASE, UNSPECIFIED WHETHER ESOPHAGITIS PRESENT: ICD-10-CM

## 2022-09-20 PROBLEM — R74.8 ELEVATED SERUM ALKALINE PHOSPHATASE LEVEL: Status: ACTIVE | Noted: 2022-09-20

## 2022-09-20 PROBLEM — E87.1 HYPONATREMIA: Status: ACTIVE | Noted: 2022-09-20

## 2022-09-20 LAB
ALBUMIN SERPL BCP-MCNC: 2.7 G/DL (ref 3.5–5)
ALP SERPL-CCNC: 482 U/L (ref 34–104)
ALT SERPL W P-5'-P-CCNC: 42 U/L (ref 7–52)
ANION GAP SERPL CALCULATED.3IONS-SCNC: 8 MMOL/L (ref 4–13)
AST SERPL W P-5'-P-CCNC: 33 U/L (ref 13–39)
BACTERIA UR QL AUTO: NORMAL /HPF
BASOPHILS # BLD MANUAL: 0.32 THOUSAND/UL (ref 0–0.1)
BASOPHILS NFR MAR MANUAL: 1 % (ref 0–1)
BILIRUB SERPL-MCNC: 0.63 MG/DL (ref 0.2–1)
BILIRUB UR QL STRIP: NEGATIVE
BUN SERPL-MCNC: 57 MG/DL (ref 5–25)
CALCIUM ALBUM COR SERPL-MCNC: 9.8 MG/DL (ref 8.3–10.1)
CALCIUM SERPL-MCNC: 8.8 MG/DL (ref 8.4–10.2)
CHLORIDE SERPL-SCNC: 100 MMOL/L (ref 96–108)
CLARITY UR: CLEAR
CO2 SERPL-SCNC: 22 MMOL/L (ref 21–32)
COLOR UR: YELLOW
CREAT SERPL-MCNC: 1.79 MG/DL (ref 0.6–1.3)
EOSINOPHIL # BLD MANUAL: 0.32 THOUSAND/UL (ref 0–0.4)
EOSINOPHIL NFR BLD MANUAL: 1 % (ref 0–6)
ERYTHROCYTE [DISTWIDTH] IN BLOOD BY AUTOMATED COUNT: 12.5 % (ref 11.6–15.1)
GFR SERPL CREATININE-BSD FRML MDRD: 24 ML/MIN/1.73SQ M
GLUCOSE SERPL-MCNC: 109 MG/DL (ref 65–140)
GLUCOSE UR STRIP-MCNC: NEGATIVE MG/DL
HCT VFR BLD AUTO: 44.8 % (ref 34.8–46.1)
HGB BLD-MCNC: 15.1 G/DL (ref 11.5–15.4)
HGB UR QL STRIP.AUTO: NEGATIVE
KETONES UR STRIP-MCNC: NEGATIVE MG/DL
LACTATE SERPL-SCNC: 1.4 MMOL/L (ref 0.5–2)
LEUKOCYTE ESTERASE UR QL STRIP: ABNORMAL
LIPASE SERPL-CCNC: 32 U/L (ref 11–82)
LYMPHOCYTES # BLD AUTO: 0.95 THOUSAND/UL (ref 0.6–4.47)
LYMPHOCYTES # BLD AUTO: 3 % (ref 14–44)
MCH RBC QN AUTO: 33.1 PG (ref 26.8–34.3)
MCHC RBC AUTO-ENTMCNC: 33.7 G/DL (ref 31.4–37.4)
MCV RBC AUTO: 98 FL (ref 82–98)
MONOCYTES # BLD AUTO: 2.21 THOUSAND/UL (ref 0–1.22)
MONOCYTES NFR BLD: 7 % (ref 4–12)
NEUTROPHILS # BLD MANUAL: 27.76 THOUSAND/UL (ref 1.85–7.62)
NEUTS BAND NFR BLD MANUAL: 18 % (ref 0–8)
NEUTS SEG NFR BLD AUTO: 70 % (ref 43–75)
NITRITE UR QL STRIP: NEGATIVE
NON-SQ EPI CELLS URNS QL MICRO: NORMAL /HPF
PH UR STRIP.AUTO: 6.5 [PH]
PLATELET # BLD AUTO: 242 THOUSANDS/UL (ref 149–390)
PLATELET BLD QL SMEAR: ADEQUATE
PMV BLD AUTO: 10.3 FL (ref 8.9–12.7)
POTASSIUM SERPL-SCNC: 4.2 MMOL/L (ref 3.5–5.3)
PROCALCITONIN SERPL-MCNC: 6.47 NG/ML
PROT SERPL-MCNC: 5.6 G/DL (ref 6.4–8.4)
PROT UR STRIP-MCNC: ABNORMAL MG/DL
RBC # BLD AUTO: 4.56 MILLION/UL (ref 3.81–5.12)
RBC #/AREA URNS AUTO: NORMAL /HPF
RBC MORPH BLD: NORMAL
SARS-COV-2 RNA RESP QL NAA+PROBE: NEGATIVE
SODIUM SERPL-SCNC: 130 MMOL/L (ref 135–147)
SP GR UR STRIP.AUTO: 1.02 (ref 1–1.03)
UROBILINOGEN UR STRIP-ACNC: <2 MG/DL
WBC # BLD AUTO: 31.54 THOUSAND/UL (ref 4.31–10.16)
WBC #/AREA URNS AUTO: NORMAL /HPF

## 2022-09-20 PROCEDURE — 96365 THER/PROPH/DIAG IV INF INIT: CPT

## 2022-09-20 PROCEDURE — 81001 URINALYSIS AUTO W/SCOPE: CPT

## 2022-09-20 PROCEDURE — 85027 COMPLETE CBC AUTOMATED: CPT

## 2022-09-20 PROCEDURE — 87040 BLOOD CULTURE FOR BACTERIA: CPT

## 2022-09-20 PROCEDURE — 99285 EMERGENCY DEPT VISIT HI MDM: CPT | Performed by: EMERGENCY MEDICINE

## 2022-09-20 PROCEDURE — 85007 BL SMEAR W/DIFF WBC COUNT: CPT

## 2022-09-20 PROCEDURE — 96367 TX/PROPH/DG ADDL SEQ IV INF: CPT

## 2022-09-20 PROCEDURE — 83605 ASSAY OF LACTIC ACID: CPT

## 2022-09-20 PROCEDURE — G1004 CDSM NDSC: HCPCS

## 2022-09-20 PROCEDURE — 96361 HYDRATE IV INFUSION ADD-ON: CPT

## 2022-09-20 PROCEDURE — U0005 INFEC AGEN DETEC AMPLI PROBE: HCPCS

## 2022-09-20 PROCEDURE — 99285 EMERGENCY DEPT VISIT HI MDM: CPT

## 2022-09-20 PROCEDURE — 36415 COLL VENOUS BLD VENIPUNCTURE: CPT

## 2022-09-20 PROCEDURE — 74176 CT ABD & PELVIS W/O CONTRAST: CPT

## 2022-09-20 PROCEDURE — 80053 COMPREHEN METABOLIC PANEL: CPT

## 2022-09-20 PROCEDURE — 83690 ASSAY OF LIPASE: CPT

## 2022-09-20 PROCEDURE — 84145 PROCALCITONIN (PCT): CPT

## 2022-09-20 PROCEDURE — 99223 1ST HOSP IP/OBS HIGH 75: CPT | Performed by: PHYSICIAN ASSISTANT

## 2022-09-20 PROCEDURE — U0003 INFECTIOUS AGENT DETECTION BY NUCLEIC ACID (DNA OR RNA); SEVERE ACUTE RESPIRATORY SYNDROME CORONAVIRUS 2 (SARS-COV-2) (CORONAVIRUS DISEASE [COVID-19]), AMPLIFIED PROBE TECHNIQUE, MAKING USE OF HIGH THROUGHPUT TECHNOLOGIES AS DESCRIBED BY CMS-2020-01-R: HCPCS

## 2022-09-20 RX ORDER — ACETAMINOPHEN 325 MG/1
650 TABLET ORAL ONCE
Status: COMPLETED | OUTPATIENT
Start: 2022-09-20 | End: 2022-09-20

## 2022-09-20 RX ADMIN — SODIUM CHLORIDE, SODIUM LACTATE, POTASSIUM CHLORIDE, AND CALCIUM CHLORIDE 500 ML: .6; .31; .03; .02 INJECTION, SOLUTION INTRAVENOUS at 15:37

## 2022-09-20 RX ADMIN — SODIUM CHLORIDE 1000 ML: 0.9 INJECTION, SOLUTION INTRAVENOUS at 16:57

## 2022-09-20 RX ADMIN — ACETAMINOPHEN 650 MG: 325 TABLET, FILM COATED ORAL at 18:27

## 2022-09-20 RX ADMIN — PIPERACILLIN AND TAZOBACTAM 2.25 G: 2; .25 INJECTION, POWDER, FOR SOLUTION INTRAVENOUS at 18:21

## 2022-09-20 NOTE — ED PROVIDER NOTES
History  Chief Complaint   Patient presents with    Abnormal Lab     Pt EMS pt comes from [facility] dementia unit  Staff reports that pts WBC are allegedly 26 but provided no proof  Pt only complaint R upper leg pain  John Chery is a 80year old female presenting from her nursing facility for evaluation of recent abnormal lab findings including elevated liver enzymes, elevated white blood cell count  Patient's daughter is present and helped provide history  According to daughter, the patient was seen and treated hospital liver 1 week ago after a fall, patient was complaining of right hip pain at that time, x-rays were negative for any acute fractures  Patient was discharged to a nursing facility for rehabilitation for ambulatory dysfunction  Over the last several days, the patient has been complaining of generalized abdominal pain, she has no complaints of abdominal pain here today  Her daughter also notes that she has been having diarrhea, no vomiting, no fevers  Patient's only complaint today is right hip pain  History provided by:  Patient and relative (Daughter)  History limited by:  Dementia   used: No        Prior to Admission Medications   Prescriptions Last Dose Informant Patient Reported? Taking?    Multiple Vitamin (DAILY JAMES) TABS  Family Member Yes No   Sig: Take by mouth daily   Nutritional Supplements (ENSURE ACTIVE) LIQD  Family Member Yes No   Sig: Take by mouth daily   Patient not taking: Reported on 9/10/2022   acetaminophen (TYLENOL) 325 mg tablet  Family Member Yes No   Si,000 mg 2 (two) times a day Take two tablets by mouth every 6 hours as needed   calcium carbonate (OS-CHLOÉ) 600 MG tablet  Family Member Yes No   Sig: Take 600 mg by mouth daily   denosumab (PROLIA) 60 mg/mL  Family Member Yes No   Sig: Inject under the skin every 6 (six) months   docusate sodium (COLACE) 100 mg capsule   Yes No   Sig: Take 100 mg by mouth daily   ferrous sulfate 325 (65 Fe) mg tablet  Family Member Yes No   Sig: Take 325 mg by mouth daily at bedtime   folic acid (FOLVITE) 774 mcg tablet  Family Member Yes No   Sig: Take 400 mcg by mouth daily   gabapentin (NEURONTIN) 100 mg capsule   Yes No   Sig: Take 100 mg by mouth daily at bedtime   levothyroxine 50 mcg tablet   Yes No   Sig: Take 50 mcg by mouth daily To take 1 p o  daily on an empty stomach   mirtazapine (REMERON) 15 mg tablet  Family Member Yes No   Sig: Take 7 5 mg by mouth daily at bedtime    potassium citrate (UROCIT-K) 10 mEq   Yes No   Sig: Take 1 tablet by mouth 2 (two) times a day   rOPINIRole (REQUIP) 2 mg tablet   Yes No   Sig: Take 1 mg by mouth in the morning   saccharomyces boulardii (FLORASTOR) 250 mg capsule   Yes No   Sig: Take 250 mg by mouth daily   senna-docusate sodium (Senexon-S) 8 6-50 mg per tablet   Yes No   Sig: Take 1 tablet by mouth daily      Facility-Administered Medications: None       Past Medical History:   Diagnosis Date    Closed displaced fracture of acromial end of left clavicle with routine healing     Disease of thyroid gland     Endometrial cancer (HCC)     Hip pain, left     History of rib fracture     MCI (mild cognitive impairment)     Osteoarthritis of hand     Urinary frequency     Urinary incontinence        Past Surgical History:   Procedure Laterality Date    HYSTERECTOMY         Family History   Problem Relation Age of Onset    Breast cancer Mother     Diabetes Mother     Multiple sclerosis Father      I have reviewed and agree with the history as documented  E-Cigarette/Vaping     E-Cigarette/Vaping Substances     Social History     Tobacco Use    Smoking status: Current Every Day Smoker     Packs/day: 0 20     Types: Cigarettes    Smokeless tobacco: Never Used   Substance Use Topics    Alcohol use:  Yes     Alcohol/week: 7 0 standard drinks     Types: 7 Shots of liquor per week    Drug use: No        Review of Systems   Constitutional: Negative for chills and fever  HENT: Negative for ear pain and sore throat  Eyes: Negative for pain and visual disturbance  Respiratory: Negative for cough and shortness of breath  Cardiovascular: Negative for chest pain and palpitations  Gastrointestinal: Positive for abdominal pain and diarrhea  Negative for nausea and vomiting  Genitourinary: Negative for dysuria and hematuria  Musculoskeletal: Positive for arthralgias  Negative for back pain  Right hip pain   Skin: Negative for color change and rash  Neurological: Negative for seizures and syncope  All other systems reviewed and are negative  Physical Exam  ED Triage Vitals [09/20/22 1325]   Temperature Pulse Respirations Blood Pressure SpO2   98 2 °F (36 8 °C) 90 16 103/55 98 %      Temp Source Heart Rate Source Patient Position - Orthostatic VS BP Location FiO2 (%)   Oral Monitor Lying Right arm --      Pain Score       --             Orthostatic Vital Signs  Vitals:    09/20/22 1325 09/20/22 1659 09/20/22 1900   BP: 103/55 121/79 100/55   Pulse: 90 100 99   Patient Position - Orthostatic VS: Lying Lying Lying       Physical Exam  Vitals and nursing note reviewed  Constitutional:       General: She is not in acute distress  Appearance: Normal appearance  HENT:      Head: Normocephalic and atraumatic  Mouth/Throat:      Mouth: Mucous membranes are moist       Pharynx: Oropharynx is clear  Eyes:      General: No scleral icterus  Conjunctiva/sclera: Conjunctivae normal    Cardiovascular:      Rate and Rhythm: Normal rate and regular rhythm  Heart sounds: Normal heart sounds  Pulmonary:      Effort: Pulmonary effort is normal  No respiratory distress  Breath sounds: Normal breath sounds  No wheezing, rhonchi or rales  Abdominal:      General: Abdomen is flat  There is no distension  Palpations: Abdomen is soft  Tenderness: There is no abdominal tenderness  There is no guarding or rebound  Musculoskeletal:         General: No tenderness or signs of injury  Cervical back: Neck supple  No rigidity  Comments: Patient has full range of motion of the right hip, no tenderness with ranging of the hip, mild tenderness to palpation of the right hip, no overlying rash, redness/erythema, the right lower extremity is neurovascularly intact  Skin:     General: Skin is warm  Coloration: Skin is not jaundiced  Findings: No erythema or rash  Neurological:      General: No focal deficit present  Mental Status: She is alert  Mental status is at baseline  Psychiatric:         Mood and Affect: Mood normal          Behavior: Behavior normal          ED Medications  Medications   lactated ringers bolus 500 mL (0 mL Intravenous Stopped 9/20/22 1657)   sodium chloride 0 9 % bolus 1,000 mL (0 mL Intravenous Stopped 9/20/22 1816)   piperacillin-tazobactam (ZOSYN) 2 25 g in sodium chloride 0 9 % 50 mL IVPB (0 g Intravenous Stopped 9/20/22 1918)   acetaminophen (TYLENOL) tablet 650 mg (650 mg Oral Given 9/20/22 1827)       Diagnostic Studies  Results Reviewed     Procedure Component Value Units Date/Time    COVID only [[id number]]  (Normal) Collected: 09/20/22 1819    Lab Status: Final result Specimen: Nares from Nose Updated: 09/20/22 1924     SARS-CoV-2 Negative    Narrative:      FOR PEDIATRIC PATIENTS - copy/paste COVID Guidelines URL to browser: [email or web address]    SARS-CoV-2 assay is a Nucleic Acid Amplification assay intended for the  qualitative detection of nucleic acid from SARS-CoV-2 in nasopharyngeal  swabs  Results are for the presumptive identification of SARS-CoV-2 RNA  Positive results are indicative of infection with SARS-CoV-2, the virus  causing COVID-19, but do not rule out bacterial infection or co-infection  with other viruses   Laboratories within the [place] and its  territories are required to report all positive results to the appropriate  public health authorities  Negative results do not preclude SARS-CoV-2  infection and should not be used as the sole basis for treatment or other  patient management decisions  Negative results must be combined with  clinical observations, patient history, and epidemiological information  This test has not been FDA cleared or approved  This test has been authorized by FDA under an Emergency Use Authorization  (EUA)  This test is only authorized for the duration of time the  declaration that circumstances exist justifying the authorization of the  emergency use of an in vitro diagnostic tests for detection of SARS-CoV-2  virus and/or diagnosis of COVID-19 infection under section 564(b)(1) of  the Act, 21 U  S C  460CBG-9(S)(8), unless the authorization is terminated  or revoked sooner  The test has been validated but independent review by FDA  and CLIA is pending  Test performed using [employer] GeneXpert: This RT-PCR assay targets N2,  a region unique to SARS-CoV-2  A conserved region in the E-gene was chosen  for pan-Sarbecovirus detection which includes SARS-CoV-2  According to CMS-2020-01-R, this platform meets the definition of high-throughput technology  Stool Enteric Bacterial Panel by PCR [[id number]]     Lab Status: No result Specimen: Stool     Clostridium difficile toxin by PCR with EIA [[id number]]     Lab Status: No result Specimen: Stool from Per Rectum     Lactic acid [[id number]]  (Normal) Collected: 09/20/22 1819    Lab Status: Final result Specimen: Blood from Arm, Left Updated: 09/20/22 1854     LACTIC ACID 1 4 mmol/L     Narrative:      Result may be elevated if tourniquet was used during collection  Blood culture #2 [[id number]] Collected: 09/20/22 1819    Lab Status: In process Specimen: Blood from Arm, Right Updated: 09/20/22 1827    Blood culture #1 [[id number]] Collected: 09/20/22 1819    Lab Status:  In process Specimen: Blood from Arm, Left Updated: 09/20/22 1827    Procalcitonin [[id number]]  (Abnormal) Collected: 09/20/22 1537    Lab Status: Final result Specimen: Blood from Arm, Left Updated: 09/20/22 1813     Procalcitonin 6 47 ng/ml     Urine Microscopic [[id number]]  (Normal) Collected: 09/20/22 1658    Lab Status: Final result Specimen: Urine, Clean Catch Updated: 09/20/22 1745     RBC, UA 1-2 /hpf      WBC, UA 1-2 /hpf      Epithelial Cells Occasional /hpf      Bacteria, UA None Seen /hpf     UA w Reflex to Microscopic w Reflex to Culture [[id number]]  (Abnormal) Collected: 09/20/22 1658    Lab Status: Final result Specimen: Urine, Clean Catch Updated: 09/20/22 1743     Color, UA Yellow     Clarity, UA Clear     Specific Gravity, UA 1 016     pH, UA 6 5     Leukocytes, UA Trace     Nitrite, UA Negative     Protein, UA Trace mg/dl      Glucose, UA Negative mg/dl      Ketones, UA Negative mg/dl      Urobilinogen, UA <2 0 mg/dl      Bilirubin, UA Negative     Occult Blood, UA Negative    CBC and differential [[id number]]  (Abnormal) Collected: 09/20/22 1537    Lab Status: Final result Specimen: Blood from Arm, Left Updated: 09/20/22 1730     WBC 31 54 Thousand/uL      RBC 4 56 Million/uL      Hemoglobin 15 1 g/dL      Hematocrit 44 8 %      MCV 98 fL      MCH 33 1 pg      MCHC 33 7 g/dL      RDW 12 5 %      MPV 10 3 fL      Platelets 017 Thousands/uL     Manual Differential(PHLEBS Do Not Order) [[id number]]  (Abnormal) Collected: 09/20/22 1537    Lab Status: Final result Specimen: Blood from Arm, Left Updated: 09/20/22 1722     Segmented % 70 %      Bands % 18 %      Lymphocytes % 3 %      Monocytes % 7 %      Eosinophils, % 1 %      Basophils % 1 %      Absolute Neutrophils 27 76 Thousand/uL      Lymphocytes Absolute 0 95 Thousand/uL      Monocytes Absolute 2 21 Thousand/uL      Eosinophils Absolute 0 32 Thousand/uL      Basophils Absolute 0 32 Thousand/uL      Total Counted --     RBC Morphology Normal     Platelet Estimate Adequate    Comprehensive metabolic panel [[id number]]  (Abnormal) Collected: 09/20/22 1537    Lab Status: Final result Specimen: Blood from Arm, Left Updated: 09/20/22 1610     Sodium 130 mmol/L      Potassium 4 2 mmol/L      Chloride 100 mmol/L      CO2 22 mmol/L      ANION GAP 8 mmol/L      BUN 57 mg/dL      Creatinine 1 79 mg/dL      Glucose 109 mg/dL      Calcium 8 8 mg/dL      Corrected Calcium 9 8 mg/dL      AST 33 U/L      ALT 42 U/L      Alkaline Phosphatase 482 U/L      Total Protein 5 6 g/dL      Albumin 2 7 g/dL      Total Bilirubin 0 63 mg/dL      eGFR 24 ml/min/1 73sq m     Narrative:      [place] guidelines for Chronic Kidney Disease (CKD):     Stage 1 with normal or high GFR (GFR > 90 mL/min/1 73 square meters)    Stage 2 Mild CKD (GFR = 60-89 mL/min/1 73 square meters)    Stage 3A Moderate CKD (GFR = 45-59 mL/min/1 73 square meters)    Stage 3B Moderate CKD (GFR = 30-44 mL/min/1 73 square meters)    Stage 4 Severe CKD (GFR = 15-29 mL/min/1 73 square meters)    Stage 5 End Stage CKD (GFR <15 mL/min/1 73 square meters)  Note: GFR calculation is accurate only with a steady state creatinine    Lipase [[id number]]  (Normal) Collected: 09/20/22 1537    Lab Status: Final result Specimen: Blood from Arm, Left Updated: 09/20/22 1610     Lipase 32 u/L                  CT abdomen pelvis wo contrast   Final Result by Isabella Tristan MD (09/20 1745)      Severe colitis  Stable bilateral medullary nephrocalcinosis  Workstation performed: [id number]               Procedures  Procedures      ED Course  ED Course as of 09/20/22 2019   Tue Sep 20, 2022   1738 Patient found to have significantly elevated white blood cell count of greater than 30 with bandemia, will proceed with sepsis workup   1815 Patient appears to have colitis, will receive Zosyn for possible infectious source    If lactic acid is significantly elevated, proceed with a CT study with contrast to assess for possible ischemic colitis                          Initial Sepsis Screening     Row Name 09/20/22 7758                Is the patient's history suggestive of a new or worsening infection? Yes (Proceed)  -CR        Suspected source of infection suspect infection, source unknown  -CR        Are two or more of the following signs & symptoms of infection both present and new to the patient? Yes (Proceed)  -CR        Indicate SIRS criteria Leukocytosis (WBC > 33305 IJL);WBC > 10% bands  -CR        If the answer is yes to both questions, suspicion of sepsis is present --        If severe sepsis is present AND tissue hypoperfusion perists in the hour after fluid resuscitation or lactate > 4, the patient meets criteria for SEPTIC SHOCK --        Are any of the following organ dysfunction criteria present within 6 hours of suspected infection and SIRS criteria that are NOT considered to be chronic conditions? No  -CR        Organ dysfunction --        Date of presentation of severe sepsis --        Time of presentation of severe sepsis --        Tissue hypoperfusion persists in the hour after crystalloid fluid administration, evidenced, by either: --        Was hypotension present within one hour of the conclusion of crystalloid fluid administration? --        Date of presentation of septic shock --        Time of presentation of septic shock --              User Key  (r) = Recorded By, (t) = Taken By, (c) = Cosigned By    [address] Name Provider Type    CR Santana Osei, DO Resident                          MDM  Number of Diagnoses or Management Options  MILDRED (acute kidney injury) [facility]): new and requires workup  Colitis: new and requires workup  Sepsis [facility]): new and requires workup  Risk of Complications, Morbidity, and/or Mortality  General comments: Jesus Salas is a 80year old female presenting from her nursing facility for evaluation of recent abnormal lab findings including elevated liver enzymes, elevated white blood cell count  Her daughter was present help provide some history, she also endorsed recent complaints of abdominal pain, diarrhea  Patient was initially well-appearing on examination, her only complaint was right hip pain, she had full range of motion of the right hip without significant tenderness when ranging the hip, only noted tenderness when palpating the lateral right hip  No overlying rash or erythema obvious deformity, right lower extremity neurovascularly intact  She did not have any abdominal tenderness to palpation  Given reports abnormal liver enzymes, a white blood cell count, abdominal pain, had concern for acute intra-abdominal pathology  Patient was found here to have significantly elevated white blood cell count greater than 31, also had bandemia, technically met sepsis criteria and a sepsis workup was initiated, concern for intra-abdominal infection  Patient also started on broad-spectrum antibiotics with Zosyn  CT of the abdomen and pelvis showed severe colitis which would explain her sepsis  Lactic acid within normal limits, do not suspect ischemic colitis, likely infectious in nature, she was also recently in the hospital after the fall, concern for C diff, stool samples pending collection  Patient to be admitted for further treatment of this colitis and sepsis      Patient Progress  Patient progress: stable      Disposition  Final diagnoses:   Sepsis ([place] [address] )   Colitis   MILDRED (acute kidney injury) ([place] [address] )     Time reflects when diagnosis was documented in both MDM as applicable and the Disposition within this note     Time User Action Codes Description Comment    9/20/2022  7:09 PM Santana Adrian Add [A41 9] Sepsis ([place] [address] )     9/20/2022  7:10 PM Santana Adrian Add [K52 9] Colitis     9/20/2022  7:10 PM Santana Adrian Modify [A41 9] Sepsis ([place] [address] )     9/20/2022  7:10 PM Satnana Adrian Modify [K52 9] Colitis     9/20/2022  7:10 PM Santana Adrian Add [N17 9] MILDRED (acute kidney injury) ([place] [address] ) ED Disposition     ED Disposition   Admit    Condition   Stable    Date/Time   Tue Sep 20, 2022  7:10 PM    Comment   Case was discussed with Dr Estrellita Leyva and the patient's admission status was agreed to be Admission Status: inpatient status to the service of Dr Estrellita Leyva  Follow-up Information    None         Patient's Medications   Discharge Prescriptions    No medications on file     No discharge procedures on file  PDMP Review       Value Time User    PDMP Reviewed  Yes 10/5/2020  9:13 PM John Wick DO           ED Provider  Attending physically available and evaluated Merlin Becton I managed the patient along with the ED Attending      Electronically Signed by         Sandy Grimm DO  09/20/22 2020

## 2022-09-20 NOTE — SEPSIS NOTE
Sepsis Note   Milderd Erik 80 y o  female MRN: [id number]  Unit/Bed#: ED-07 Encounter: [id number]       qSOFA     Row Name 09/20/22 1900 09/20/22 1659 09/20/22 1325          Altered mental status GCS < 15 -- -- --      Respiratory Rate > / =22 0 0 0      Systolic BP < / =944 1 0 0      Q Sofa Score 1 0 0                 Initial Sepsis Screening     Row Name 09/20/22 4612                Is the patient's history suggestive of a new or worsening infection? Yes (Proceed)  -CR        Suspected source of infection suspect infection, source unknown  -CR        Are two or more of the following signs & symptoms of infection both present and new to the patient? Yes (Proceed)  -CR        Indicate SIRS criteria Leukocytosis (WBC > 98020 IJL);WBC > 10% bands  -CR        If the answer is yes to both questions, suspicion of sepsis is present --        If severe sepsis is present AND tissue hypoperfusion perists in the hour after fluid resuscitation or lactate > 4, the patient meets criteria for SEPTIC SHOCK --        Are any of the following organ dysfunction criteria present within 6 hours of suspected infection and SIRS criteria that are NOT considered to be chronic conditions?  No  -CR        Organ dysfunction --        Date of presentation of severe sepsis --        Time of presentation of severe sepsis --        Tissue hypoperfusion persists in the hour after crystalloid fluid administration, evidenced, by either: --        Was hypotension present within one hour of the conclusion of crystalloid fluid administration? --        Date of presentation of septic shock --        Time of presentation of septic shock --              User Key  (r) = Recorded By, (t) = Taken By, (c) = Cosigned By    [address] Name Provider Type    CR DO Chantelle Pathak Cera

## 2022-09-20 NOTE — ED PROCEDURE NOTE
Procedure  US Guided Peripheral IV    Date/Time: 9/20/2022 4:39 PM  Performed by: Olga Karimi MD  Authorized by: Olga Karimi MD     Patient location:  ED  Performed by:  Resident  Other Assisting Provider: Yes (comment) (Dr Rui Sanchez)    Indications:     Indications: difficulty obtaining IV access      Image availability:  Images available in PACS and video obtained  Procedure details:     Patient evaluated for contraindications to access (i e  fistula, thrombosis, etc): Yes      Standard clean technique used for ultrasound access: Yes      Location:  Left arm    Catheter size:  20 gauge    Number of attempts:  1    Successful placement: yes    Post-procedure details:     Post-procedure:  Dressing applied    Assessment: free fluid flow and no signs of infiltration      Post-procedure complications: none      Patient tolerance of procedure:   Tolerated well, no immediate complications                     Olga Karimi MD  09/20/22 1640

## 2022-09-20 NOTE — ED PROCEDURE NOTE
Procedure  POC Biliary US    Date/Time: 9/20/2022 4:41 PM  Performed by: Ashvin Corley MD  Authorized by:  Ashvin Corley MD     Patient location:  ED  Performed by:  Resident  Other Assisting Provider: Yes (comment) (Dr Catina Crandall)    Procedure details:     Exam Type:  Educational    Indications: epigastric pain      Assessment for:  Cholecystitis and cholelithiasis    Views obtained: gallbladder (transverse and longitudinal) and liver      Image quality: limited diagnostic      Image availability:  Images available in PACS  Interpretation:     Biliary ultrasound impressions: indeterminate                       Ashvin Corley MD  09/20/22 2922

## 2022-09-21 LAB
ALBUMIN SERPL BCP-MCNC: 2.3 G/DL (ref 3.5–5)
ALP SERPL-CCNC: 396 U/L (ref 34–104)
ALT SERPL W P-5'-P-CCNC: 34 U/L (ref 7–52)
ANION GAP SERPL CALCULATED.3IONS-SCNC: 8 MMOL/L (ref 4–13)
ANION GAP SERPL CALCULATED.3IONS-SCNC: 9 MMOL/L (ref 4–13)
AST SERPL W P-5'-P-CCNC: 30 U/L (ref 13–39)
BASOPHILS # BLD MANUAL: 0 THOUSAND/UL (ref 0–0.1)
BASOPHILS NFR MAR MANUAL: 0 % (ref 0–1)
BILIRUB SERPL-MCNC: 0.66 MG/DL (ref 0.2–1)
BUN SERPL-MCNC: 47 MG/DL (ref 5–25)
BUN SERPL-MCNC: 49 MG/DL (ref 5–25)
C DIFF TOX A+B STL QL IA: POSITIVE
C DIFF TOX B TCDB STL QL NAA+PROBE: POSITIVE
CALCIUM ALBUM COR SERPL-MCNC: 9.4 MG/DL (ref 8.3–10.1)
CALCIUM SERPL-MCNC: 8 MG/DL (ref 8.4–10.2)
CALCIUM SERPL-MCNC: 8.1 MG/DL (ref 8.4–10.2)
CAMPYLOBACTER DNA SPEC NAA+PROBE: NORMAL
CHLORIDE SERPL-SCNC: 105 MMOL/L (ref 96–108)
CHLORIDE SERPL-SCNC: 105 MMOL/L (ref 96–108)
CO2 SERPL-SCNC: 18 MMOL/L (ref 21–32)
CO2 SERPL-SCNC: 19 MMOL/L (ref 21–32)
CREAT SERPL-MCNC: 1.46 MG/DL (ref 0.6–1.3)
CREAT SERPL-MCNC: 1.46 MG/DL (ref 0.6–1.3)
EOSINOPHIL # BLD MANUAL: 0 THOUSAND/UL (ref 0–0.4)
EOSINOPHIL NFR BLD MANUAL: 0 % (ref 0–6)
ERYTHROCYTE [DISTWIDTH] IN BLOOD BY AUTOMATED COUNT: 12.3 % (ref 11.6–15.1)
GFR SERPL CREATININE-BSD FRML MDRD: 31 ML/MIN/1.73SQ M
GFR SERPL CREATININE-BSD FRML MDRD: 31 ML/MIN/1.73SQ M
GLUCOSE SERPL-MCNC: 83 MG/DL (ref 65–140)
GLUCOSE SERPL-MCNC: 90 MG/DL (ref 65–140)
HCT VFR BLD AUTO: 41.7 % (ref 34.8–46.1)
HGB BLD-MCNC: 13.9 G/DL (ref 11.5–15.4)
LYMPHOCYTES # BLD AUTO: 0.38 THOUSAND/UL (ref 0.6–4.47)
LYMPHOCYTES # BLD AUTO: 1 % (ref 14–44)
MAGNESIUM SERPL-MCNC: 1.9 MG/DL (ref 1.9–2.7)
MCH RBC QN AUTO: 32 PG (ref 26.8–34.3)
MCHC RBC AUTO-ENTMCNC: 33.3 G/DL (ref 31.4–37.4)
MCV RBC AUTO: 96 FL (ref 82–98)
MONOCYTES # BLD AUTO: 1.51 THOUSAND/UL (ref 0–1.22)
MONOCYTES NFR BLD: 4 % (ref 4–12)
NEUTROPHILS # BLD MANUAL: 35.84 THOUSAND/UL (ref 1.85–7.62)
NEUTS BAND NFR BLD MANUAL: 29 % (ref 0–8)
NEUTS SEG NFR BLD AUTO: 66 % (ref 43–75)
PLATELET # BLD AUTO: 223 THOUSANDS/UL (ref 149–390)
PLATELET # BLD AUTO: 228 THOUSANDS/UL (ref 149–390)
PLATELET BLD QL SMEAR: ADEQUATE
PMV BLD AUTO: 10.6 FL (ref 8.9–12.7)
PMV BLD AUTO: 9.9 FL (ref 8.9–12.7)
POTASSIUM SERPL-SCNC: 3.3 MMOL/L (ref 3.5–5.3)
POTASSIUM SERPL-SCNC: 3.6 MMOL/L (ref 3.5–5.3)
PROCALCITONIN SERPL-MCNC: 5.89 NG/ML
PROT SERPL-MCNC: 4.7 G/DL (ref 6.4–8.4)
RBC # BLD AUTO: 4.34 MILLION/UL (ref 3.81–5.12)
RBC MORPH BLD: NORMAL
SALMONELLA DNA SPEC QL NAA+PROBE: NORMAL
SHIGA TOXIN STX GENE SPEC NAA+PROBE: NORMAL
SHIGELLA DNA SPEC QL NAA+PROBE: NORMAL
SODIUM SERPL-SCNC: 132 MMOL/L (ref 135–147)
SODIUM SERPL-SCNC: 132 MMOL/L (ref 135–147)
WBC # BLD AUTO: 37.73 THOUSAND/UL (ref 4.31–10.16)

## 2022-09-21 PROCEDURE — 87493 C DIFF AMPLIFIED PROBE: CPT | Performed by: PHYSICIAN ASSISTANT

## 2022-09-21 PROCEDURE — 84145 PROCALCITONIN (PCT): CPT | Performed by: PHYSICIAN ASSISTANT

## 2022-09-21 PROCEDURE — 87505 NFCT AGENT DETECTION GI: CPT | Performed by: PHYSICIAN ASSISTANT

## 2022-09-21 PROCEDURE — 85027 COMPLETE CBC AUTOMATED: CPT | Performed by: PHYSICIAN ASSISTANT

## 2022-09-21 PROCEDURE — 99232 SBSQ HOSP IP/OBS MODERATE 35: CPT | Performed by: INTERNAL MEDICINE

## 2022-09-21 PROCEDURE — 80053 COMPREHEN METABOLIC PANEL: CPT | Performed by: PHYSICIAN ASSISTANT

## 2022-09-21 PROCEDURE — 80048 BASIC METABOLIC PNL TOTAL CA: CPT | Performed by: PHYSICIAN ASSISTANT

## 2022-09-21 PROCEDURE — 85007 BL SMEAR W/DIFF WBC COUNT: CPT | Performed by: PHYSICIAN ASSISTANT

## 2022-09-21 PROCEDURE — 85049 AUTOMATED PLATELET COUNT: CPT | Performed by: PHYSICIAN ASSISTANT

## 2022-09-21 PROCEDURE — 83735 ASSAY OF MAGNESIUM: CPT | Performed by: PHYSICIAN ASSISTANT

## 2022-09-21 RX ORDER — SODIUM CHLORIDE, SODIUM LACTATE, POTASSIUM CHLORIDE, CALCIUM CHLORIDE 600; 310; 30; 20 MG/100ML; MG/100ML; MG/100ML; MG/100ML
50 INJECTION, SOLUTION INTRAVENOUS CONTINUOUS
Status: DISCONTINUED | OUTPATIENT
Start: 2022-09-21 | End: 2022-09-26

## 2022-09-21 RX ORDER — ACETAMINOPHEN 325 MG/1
650 TABLET ORAL EVERY 8 HOURS PRN
Status: DISCONTINUED | OUTPATIENT
Start: 2022-09-21 | End: 2022-09-22

## 2022-09-21 RX ORDER — LEVOTHYROXINE SODIUM 0.05 MG/1
50 TABLET ORAL
Status: DISCONTINUED | OUTPATIENT
Start: 2022-09-21 | End: 2022-09-27 | Stop reason: HOSPADM

## 2022-09-21 RX ORDER — LANOLIN ALCOHOL/MO/W.PET/CERES
400 CREAM (GRAM) TOPICAL DAILY
Status: DISCONTINUED | OUTPATIENT
Start: 2022-09-21 | End: 2022-09-27 | Stop reason: HOSPADM

## 2022-09-21 RX ORDER — SODIUM CHLORIDE, SODIUM LACTATE, POTASSIUM CHLORIDE, CALCIUM CHLORIDE 600; 310; 30; 20 MG/100ML; MG/100ML; MG/100ML; MG/100ML
50 INJECTION, SOLUTION INTRAVENOUS CONTINUOUS
Status: DISCONTINUED | OUTPATIENT
Start: 2022-09-21 | End: 2022-09-21

## 2022-09-21 RX ORDER — MIRTAZAPINE 15 MG/1
7.5 TABLET, FILM COATED ORAL
Status: DISCONTINUED | OUTPATIENT
Start: 2022-09-21 | End: 2022-09-27 | Stop reason: HOSPADM

## 2022-09-21 RX ORDER — CEFTRIAXONE 1 G/50ML
1000 INJECTION, SOLUTION INTRAVENOUS EVERY 24 HOURS
Status: DISCONTINUED | OUTPATIENT
Start: 2022-09-21 | End: 2022-09-22

## 2022-09-21 RX ORDER — METRONIDAZOLE 500 MG/1
500 TABLET ORAL EVERY 8 HOURS SCHEDULED
Status: DISCONTINUED | OUTPATIENT
Start: 2022-09-21 | End: 2022-09-22

## 2022-09-21 RX ORDER — MICONAZOLE NITRATE 20 MG/G
CREAM TOPICAL 2 TIMES DAILY
Status: DISCONTINUED | OUTPATIENT
Start: 2022-09-21 | End: 2022-09-27 | Stop reason: HOSPADM

## 2022-09-21 RX ORDER — FERROUS SULFATE 325(65) MG
325 TABLET ORAL
Status: DISCONTINUED | OUTPATIENT
Start: 2022-09-21 | End: 2022-09-27 | Stop reason: HOSPADM

## 2022-09-21 RX ORDER — GABAPENTIN 100 MG/1
100 CAPSULE ORAL
Status: DISCONTINUED | OUTPATIENT
Start: 2022-09-21 | End: 2022-09-27 | Stop reason: HOSPADM

## 2022-09-21 RX ORDER — ROPINIROLE 1 MG/1
1 TABLET, FILM COATED ORAL DAILY
Status: DISCONTINUED | OUTPATIENT
Start: 2022-09-21 | End: 2022-09-27 | Stop reason: HOSPADM

## 2022-09-21 RX ORDER — HEPARIN SODIUM 5000 [USP'U]/ML
5000 INJECTION, SOLUTION INTRAVENOUS; SUBCUTANEOUS EVERY 8 HOURS SCHEDULED
Status: DISCONTINUED | OUTPATIENT
Start: 2022-09-21 | End: 2022-09-27 | Stop reason: HOSPADM

## 2022-09-21 RX ADMIN — SODIUM CHLORIDE, SODIUM LACTATE, POTASSIUM CHLORIDE, AND CALCIUM CHLORIDE 100 ML/HR: .6; .31; .03; .02 INJECTION, SOLUTION INTRAVENOUS at 09:34

## 2022-09-21 RX ADMIN — FERROUS SULFATE TAB 325 MG (65 MG ELEMENTAL FE) 325 MG: 325 (65 FE) TAB at 22:34

## 2022-09-21 RX ADMIN — MIRTAZAPINE 7.5 MG: 15 TABLET, FILM COATED ORAL at 22:37

## 2022-09-21 RX ADMIN — METRONIDAZOLE 500 MG: 500 TABLET ORAL at 22:34

## 2022-09-21 RX ADMIN — HEPARIN SODIUM 5000 UNITS: 5000 INJECTION INTRAVENOUS; SUBCUTANEOUS at 05:20

## 2022-09-21 RX ADMIN — ROPINIROLE 1 MG: 1 TABLET, FILM COATED ORAL at 09:33

## 2022-09-21 RX ADMIN — ACETAMINOPHEN 650 MG: 325 TABLET, FILM COATED ORAL at 15:11

## 2022-09-21 RX ADMIN — FOLIC ACID TAB 400 MCG 400 MCG: 400 TAB at 09:34

## 2022-09-21 RX ADMIN — METRONIDAZOLE 500 MG: 500 TABLET ORAL at 13:46

## 2022-09-21 RX ADMIN — SODIUM CHLORIDE, SODIUM LACTATE, POTASSIUM CHLORIDE, AND CALCIUM CHLORIDE 100 ML/HR: .6; .31; .03; .02 INJECTION, SOLUTION INTRAVENOUS at 15:15

## 2022-09-21 RX ADMIN — SODIUM CHLORIDE, SODIUM LACTATE, POTASSIUM CHLORIDE, AND CALCIUM CHLORIDE 50 ML/HR: .6; .31; .03; .02 INJECTION, SOLUTION INTRAVENOUS at 03:16

## 2022-09-21 RX ADMIN — MICONAZOLE NITRATE: 20 CREAM TOPICAL at 17:56

## 2022-09-21 RX ADMIN — METRONIDAZOLE 500 MG: 500 TABLET ORAL at 00:40

## 2022-09-21 RX ADMIN — HEPARIN SODIUM 5000 UNITS: 5000 INJECTION INTRAVENOUS; SUBCUTANEOUS at 22:34

## 2022-09-21 RX ADMIN — LEVOTHYROXINE SODIUM 50 MCG: 50 TABLET ORAL at 05:20

## 2022-09-21 RX ADMIN — CEFTRIAXONE 1000 MG: 1 INJECTION, SOLUTION INTRAVENOUS at 00:49

## 2022-09-21 RX ADMIN — ACETAMINOPHEN 650 MG: 325 TABLET, FILM COATED ORAL at 03:16

## 2022-09-21 RX ADMIN — METRONIDAZOLE 500 MG: 500 TABLET ORAL at 05:20

## 2022-09-21 RX ADMIN — GABAPENTIN 100 MG: 100 CAPSULE ORAL at 00:48

## 2022-09-21 RX ADMIN — Medication 125 MG: at 17:56

## 2022-09-21 RX ADMIN — HEPARIN SODIUM 5000 UNITS: 5000 INJECTION INTRAVENOUS; SUBCUTANEOUS at 00:39

## 2022-09-21 RX ADMIN — HEPARIN SODIUM 5000 UNITS: 5000 INJECTION INTRAVENOUS; SUBCUTANEOUS at 13:46

## 2022-09-21 RX ADMIN — Medication 125 MG: at 15:11

## 2022-09-21 RX ADMIN — MIRTAZAPINE 7.5 MG: 15 TABLET, FILM COATED ORAL at 00:39

## 2022-09-21 RX ADMIN — GABAPENTIN 100 MG: 100 CAPSULE ORAL at 22:37

## 2022-09-21 NOTE — ED ATTENDING ATTESTATION
9/20/2022  Humberto Sadler DO, saw and evaluated the patient  I have discussed the patient with the resident/non-physician practitioner and agree with the resident's/non-physician practitioner's findings, Plan of Care, and MDM as documented in the resident's/non-physician practitioner's note, except where noted  All available labs and Radiology studies were reviewed  I was present for key portions of any procedure(s) performed by the resident/non-physician practitioner and I was immediately available to provide assistance  At this point I agree with the current assessment done in the Emergency Department    I have conducted an independent evaluation of this patient a history and physical is as follows:    ED Course         Critical Care Time  Procedures

## 2022-09-21 NOTE — ASSESSMENT & PLAN NOTE
· Alk phos 482 on admission; no recent LFTs work for comparison  · AST, ALT and T bili within normal limits  · Uncertain etiology  In the setting of acute colitis/ sepsis     · Liver unremarkable on CT scan

## 2022-09-21 NOTE — ASSESSMENT & PLAN NOTE
Lab Results   Component Value Date    EGFR 31 09/21/2022    EGFR 31 09/21/2022    EGFR 24 09/20/2022    CREATININE 1 46 (H) 09/21/2022    CREATININE 1 46 (H) 09/21/2022    CREATININE 1 79 (H) 09/20/2022       · Cr above baseline on presentation  · Baseline Cr 1 3-1 5  · Received 1 5 L of IV fluids thus far  · Continue IV fluids overnight pending repeat BMP to monitor Na level  · Avoid nephrotoxins and hypotension  · Monitor I&Os  · Monitor labs

## 2022-09-21 NOTE — ASSESSMENT & PLAN NOTE
· POA as evidenced by tachycardia, leukocytosis with WBC 31 54 and bandemia of 18%  · In the setting of colitis  · Lactic acid within normal limits  · Procal elevated to 6 47    · Received 1 dose of Zosyn in the ED  · Continue antibiotics with ceftriaxone and Flagyl  · Follow-up on blood cultures and stool studies  · Patient reports improvement, denies any abdominal pain today  · Continues to have elevated WBC count, elevated procalcitonin levels  Continue IV antibiotics and IV fluids

## 2022-09-21 NOTE — PHYSICIAN ADVISOR
Current patient class: Inpatient  The patient is currently on Hospital Day: 2 at [address]      This patient was originally admitted to the hospital under observation class  After admission the patient was reevaluated and determined to require further hospitalization  The patient was then documented to require at least a 2nd midnight in the hospital  As such the patient was then expected to satisfy the 2 midnight benchmark and was therefore eligible for inpatient admission  After review of the relevant documentation, labs, vital signs and test results, the patient is appropriate for INPATIENT ADMISSION  Admission to the hospital as an inpatient is a complex decision making process which requires the practitioner to consider the patients presenting complaint, history and physical examination and all relevant testing  With this in mind, in this case, the patient was deemed appropriate for INPATIENT ADMISSION  After review of the documentation and testing available at the time of the admission I concur with this clinical determination of medical necessity  Rationale is as follows: The patient is an 80-year-old female who presented to the hospital after a fall  She was placed under observation class  There was concern for urinary tract infection and she was started on ceftriaxone  She was frail appearing  Urine cultures were growing Gram-negative rods enteric like  Recommendation by provider was to continue antibiotics pending final culture results  Physical therapy evaluated the patient and recommended post-acute rehabilitation  She was not yet considered medically ready for discharge and was changed to an inpatient admission  The diagnosis remained urinary tract infection  She completed three days of intravenous antibiotics  She was hospitalized for two midnights  Changed to an inpatient admission was appropriate    She require discharge to a rehabilitation facility given her fall and frailty      The patients vitals on arrival were   ED Triage Vitals   Temperature Pulse Respirations Blood Pressure SpO2   09/20/22 1325 09/20/22 1325 09/20/22 1325 09/20/22 1325 09/20/22 1325   98 2 °F (36 8 °C) 90 16 103/55 98 %      Temp Source Heart Rate Source Patient Position - Orthostatic VS BP Location FiO2 (%)   09/20/22 1325 09/20/22 1325 09/20/22 1325 09/20/22 1325 --   Oral Monitor Lying Right arm       Pain Score       09/20/22 2245       5           Past Medical History:   Diagnosis Date    Closed displaced fracture of acromial end of left clavicle with routine healing     Disease of thyroid gland     Endometrial cancer ([facility] [address] )     Hip pain, left     History of rib fracture     MCI (mild cognitive impairment)     Osteoarthritis of hand     Urinary frequency     Urinary incontinence      Past Surgical History:   Procedure Laterality Date    HYSTERECTOMY         The patient was admitted to the hospital at  7:10 PM on 9/20/22 for the following diagnosis:  Colitis [K52 9]  Abnormal laboratory test result [R89 9]  MILDRED (acute kidney injury) ([facility] ) [N17 9]  Sepsis ([facility] [address] ) [A41 9]    Consults have been placed to:   None    Vitals:    09/20/22 2259 09/21/22 0057 09/21/22 0300 09/21/22 0726   BP: (!) 96/47 102/58 104/55 111/55   BP Location:  Right arm Right arm    Pulse: 80  105 87   Resp:    16   Temp: 98 8 °F (37 1 °C)  99 2 °F (37 3 °C) 98 5 °F (36 9 °C)   TempSrc:   Oral    SpO2: 95%  93% 95%   Weight:           Most recent labs:    Recent Labs     09/20/22  1537 09/21/22  0029 09/21/22  0431   WBC 31 54*  --   --    HGB 15 1  --   --    HCT 44 8  --   --     223  --    K 4 2 3 6 3 3*   CALCIUM 8 8 8 1* 8 0*   BUN 57* 47* 49*   CREATININE 1 79* 1 46* 1 46*   LIPASE 32  --   --    AST 33  --  30   ALT 42  --  34   ALKPHOS 482*  --  396*       Scheduled Meds:  Current Facility-Administered Medications   Medication Dose Route Frequency Provider Last Rate    acetaminophen  650 mg Oral Q8H PRN Zahira Andujar PA-C      cefTRIAXone  1,000 mg Intravenous Q24H Zahira Andujar PA-C Stopped (09/21/22 0200)    ferrous sulfate  325 mg Oral HS Zahira Andujar PA-C      folic acid  241 mcg Oral Daily Zahira Andujar PA-C      gabapentin  100 mg Oral HS Zahira Andujar PA-C      heparin (porcine)  5,000 Units Subcutaneous [facility] Zahira Andujar PA-C      lactated ringers  50 mL/hr Intravenous Continuous Zahira Andujar PA-C 50 mL/hr (09/21/22 0316)    levothyroxine  50 mcg Oral Early Morning Zahira Andujar PA-C      metroNIDAZOLE  500 mg Oral [facility]      mirtazapine  7 5 mg Oral HS Yamilex Alex PA-C      rOPINIRole  1 mg Oral Daily Yamilex Alex PA-C       Continuous Infusions:lactated ringers, 50 mL/hr, Last Rate: 50 mL/hr (09/21/22 0316)      PRN Meds:   acetaminophen    Surgical procedures (if appropriate):

## 2022-09-21 NOTE — ASSESSMENT & PLAN NOTE
· POA with Na 130  · Received 1 5L bolus while in the ED  Improving, continue IV fluids    Patient appears significantly dehydrated, increase the rate of fluids

## 2022-09-21 NOTE — ASSESSMENT & PLAN NOTE
Lab Results   Component Value Date    EGFR 24 09/20/2022    EGFR 46 09/11/2022    EGFR 31 09/10/2022    CREATININE 1 79 (H) 09/20/2022    CREATININE 1 07 09/11/2022    CREATININE 1 47 (H) 09/10/2022       · Cr above baseline on presentation  · Baseline Cr 1 3-1 5  · Received 1 5 L of IV fluids thus far  · Continue IV fluids overnight pending repeat BMP to monitor Na level  · Avoid nephrotoxins and hypotension  · Monitor I&Os    · Repeat BMP in AM

## 2022-09-21 NOTE — PROGRESS NOTES
[facility]  Progress Note - Ray Printers 1933, 80 y o  female MRN: [id number]  Unit/Bed#: S -01 Encounter: [id number]  Primary Care Provider: Makeda Pineda MD   Date and time admitted to hospital: 9/20/2022  1:21 PM    * Sepsis [facility])  Assessment & Plan  · POA as evidenced by tachycardia, leukocytosis with WBC 31 54 and bandemia of 18%  · In the setting of colitis  · Lactic acid within normal limits  · Procal elevated to 6 47    · Received 1 dose of Zosyn in the ED  · Continue antibiotics with ceftriaxone and Flagyl  · Follow-up on blood cultures and stool studies  · Patient reports improvement, denies any abdominal pain today  · Continues to have elevated WBC count, elevated procalcitonin levels  Continue IV antibiotics and IV fluids  Colitis, acute  Assessment & Plan  · Presented from [facility] with abnormal lab work, diarrhea and generalized abdominal pain for the past several days  Patient with recent hospitalization after a fall and was treated for a UTI with 3 days of IV ceftriaxone  · CT A/P shows severe colitis  · Received 1 dose of IV Zosyn in the ED  · Continue antibiotics with IV ceftriaxone and PO Flagyl  · Follow-up on stool studies  · Monitor stool output  · Currently denies abdominal pain  Elevated serum alkaline phosphatase level  Assessment & Plan  · Alk phos 482 on admission; no recent LFTs work for comparison  · AST, ALT and T bili within normal limits  · Uncertain etiology  In the setting of acute colitis/ sepsis  · Liver unremarkable on CT scan      Hyponatremia  Assessment & Plan  · POA with Na 130  · Received 1 5L bolus while in the ED  Improving, continue IV fluids  Patient appears significantly dehydrated, increase the rate of fluids    Dementia ([address] )  Assessment & Plan  · Supportive care       Stage 4 chronic kidney disease [facility])  Assessment & Plan  Lab Results   Component Value Date    EGFR 31 09/21/2022 EGFR 31 2022    EGFR 24 2022    CREATININE 1 46 (H) 2022    CREATININE 1 46 (H) 2022    CREATININE 1 79 (H) 2022       · Cr above baseline on presentation  · Baseline Cr 1 3-1 5  · Received 1 5 L of IV fluids thus far  · Continue IV fluids overnight pending repeat BMP to monitor Na level  · Avoid nephrotoxins and hypotension  · Monitor I&Os  · Monitor labs  Addendum[de-identified] Patient tested positive for C diff colitis, will start the patient on vancomycin orally  Once her WBC count improves we might be able to discontinue ceftriaxone and Flagyl  Discussed with patient's daughter      VTE Pharmacologic Prophylaxis:   Pharmacologic: Heparin  Mechanical VTE Prophylaxis in Place: Yes    Patient Centered Rounds: I have performed bedside rounds with nursing staff today  Discussions with Specialists or Other Care Team Provider:  None    Education and Discussions with Family / Patient:  Patient    Time Spent for Care: 30 minutes  More than 50% of total time spent on counseling and coordination of care as described above  Current Length of Stay: 1 day(s)    Current Patient Status: Inpatient   Certification Statement: The patient will continue to require additional inpatient hospital stay due to Acute colitis, sepsis    Discharge Plan:  48-72 hours    Code Status: Level 3 - DNAR and DNI      Subjective:   Patient denies any abdominal pain, nausea vomiting  Denies any fever, chills  Objective:     Vitals:   Temp (24hrs), Av 8 °F (37 1 °C), Min:98 5 °F (36 9 °C), Max:99 2 °F (37 3 °C)    Temp:  [98 5 °F (36 9 °C)-99 2 °F (37 3 °C)] 98 5 °F (36 9 °C)  HR:  [] 87  Resp:  [16] 16  BP: ()/(47-79) 111/55  SpO2:  [93 %-95 %] 94 %  Body mass index is 16 02 kg/m²  Input and Output Summary (last 24 hours):        Intake/Output Summary (Last 24 hours) at 2022 1425  Last data filed at 2022 1100  Gross per 24 hour   Intake 2835 ml   Output 100 ml   Net 2735 ml       Physical Exam:     Physical Exam   General appearance:  Patient not in acute distress  Eyes:  Pupils equal reacting to light  ENT:  Dry oral mucous membranes  CVS:  S1-S2 heard, regular rate and rhythm, no pedal edema  Chest:  Bilateral air entry present, clear to auscultation  Abdomen:  Soft, nontender, bowel sounds present  CNS:  No focal neurological deficits  Genitourinary: deferred  Skin:  No acute rash   psychiatric:  No psychosis  Musculoskeletal:  No joint deformities       Additional Data:     Labs:    Results from last 7 days   Lab Units 09/21/22  0431   WBC Thousand/uL 37 73*   HEMOGLOBIN g/dL 13 9   HEMATOCRIT % 41 7   PLATELETS Thousands/uL 228   BANDS PCT % 29*   LYMPHO PCT % 1*   MONO PCT % 4   EOS PCT % 0     Results from last 7 days   Lab Units 09/21/22  0431   SODIUM mmol/L 132*   POTASSIUM mmol/L 3 3*   CHLORIDE mmol/L 105   CO2 mmol/L 18*   BUN mg/dL 49*   CREATININE mg/dL 1 46*   ANION GAP mmol/L 9   CALCIUM mg/dL 8 0*   ALBUMIN g/dL 2 3*   TOTAL BILIRUBIN mg/dL 0 66   ALK PHOS U/L 396*   ALT U/L 34   AST U/L 30   GLUCOSE RANDOM mg/dL 83                 Results from last 7 days   Lab Units 09/21/22  0431 09/20/22  1819 09/20/22  1537   LACTIC ACID mmol/L  --  1 4  --    PROCALCITONIN ng/ml 5 89*  --  6 47*           * I Have Reviewed All Lab Data Listed Above  * Additional Pertinent Lab Tests Reviewed: All [facility] Admission Reviewed        Recent Cultures (last 7 days):     Results from last 7 days   Lab Units 09/21/22  0321 09/20/22  1819   BLOOD CULTURE   --  Received in Microbiology Lab  Culture in Progress  Received in Microbiology Lab  Culture in Progress     C DIFF TOXIN B BY PCR  Positive*  --        Last 24 Hours Medication List:   Current Facility-Administered Medications   Medication Dose Route Frequency Provider Last Rate    acetaminophen  650 mg Oral Q8H PRN Pawel Willis PA-C      cefTRIAXone  1,000 mg Intravenous Q24H Pawel Willis PA-C Stopped (09/21/22 0200)    ferrous sulfate  325 mg Oral HS Cozette Query, CHENTE      folic acid  303 mcg Oral Daily Cozette Query, CHENTE      gabapentin  100 mg Oral HS Cozette QueryCHENTE      heparin (porcine)  5,000 Units Subcutaneous [facility] Cozette QueryCHENTE      lactated ringers  100 mL/hr Intravenous Continuous Debo Avendano  mL/hr (09/21/22 0934)    levothyroxine  50 mcg Oral Early Morning Cozette QueryCHENTE      metroNIDAZOLE  500 mg Oral [facility] Cozette Query, [place]      mirtazapine  7 5 mg Oral HS Yamilex Alex PA-C      rOPINIRole  1 mg Oral Daily Cozette CHENTE Stiles          Today, Patient Was Seen By: Klaudia Pina MD    ** Please Note: Dictation voice to text software may have been used in the creation of this document   **

## 2022-09-21 NOTE — H&P
[facility]  H&P- Dat Carmona 1933, 80 y o  female MRN: [id number]  Unit/Bed#: S -01 Encounter: [id number]  Primary Care Provider: Ludmila Venegas MD   Date and time admitted to hospital: 9/20/2022  1:21 PM    * Sepsis [facility])  Assessment & Plan  · POA as evidenced by tachycardia, leukocytosis with WBC 31 54 and bandemia of 18%  · In the setting of colitis  · Lactic acid within normal limits  · Procal elevated to 6 47  Continue to trend  · Received 1 dose of Zosyn in the ED  · Continue antibiotics with ceftriaxone and Flagyl  · Follow-up on blood cultures and stool studies  · Repeat CBC in AM    · Monitor VS closely  Colitis, acute  Assessment & Plan  · Presented from [facility] with abnormal lab work, diarrhea and generalized abdominal pain for the past several days  Patient with recent hospitalization after a fall and was treated for a UTI with 3 days of IV ceftriaxone  · CT A/P shows severe colitis  · Received 1 dose of IV Zosyn in the ED  · Continue antibiotics with IV ceftriaxone and PO Flagyl  · Follow-up on stool studies  · Monitor stool output  · Currently denies abdominal pain  Elevated serum alkaline phosphatase level  Assessment & Plan  · Alk phos 482 on admission; no recent LFTs work for comparison  · AST, ALT and T bili within normal limits  · Uncertain etiology  In the setting of acute colitis/ sepsis  · Liver unremarkable on CT scan  · Repeat CMP in AM      Hyponatremia  Assessment & Plan  · POA with Na 130  · Received 1 5L bolus while in the ED  · Obtain repeat BMP in AM    · Gentle IV fluids overnight  Dementia [facility])  Assessment & Plan  · Supportive care       Stage 4 chronic kidney disease [facility])  Assessment & Plan  Lab Results   Component Value Date    EGFR 24 09/20/2022    EGFR 46 09/11/2022    EGFR 31 09/10/2022    CREATININE 1 79 (H) 09/20/2022    CREATININE 1 07 09/11/2022    CREATININE 1 47 (H) 09/10/2022       · Cr above baseline on presentation  · Baseline Cr 1 3-1 5  · Received 1 5 L of IV fluids thus far  · Continue IV fluids overnight pending repeat BMP to monitor Na level  · Avoid nephrotoxins and hypotension  · Monitor I&Os  · Repeat BMP in AM        VTE Pharmacologic Prophylaxis: VTE Score: 5 Moderate Risk (Score 3-4) - Pharmacological DVT Prophylaxis Ordered: heparin  Code Status: Level 3- DNAR/DNI  Discussion with family: not at this time  Anticipated Length of Stay: Patient will be admitted on an inpatient basis with an anticipated length of stay of greater than 2 midnights secondary to sepsis, colitis, need for IV antibiotics pending stool studies  Total Time for Visit, including Counseling / Coordination of Care: 70 minutes Greater than 50% of this total time spent on direct patient counseling and coordination of care  Chief Complaint: abnormal lab work and generalized abdominal pain, diarrhea    History of Present Illness:  Figueroa Mayo is a 80 y o  female with a PMH of dementia, CKD stage 4, hypothyroidism and RLS who presents with abnormal lab work and generalized abdominal pain, diarrhea  History was obtained from the patient's records due to her history of dementia  Patient resides at [facility] dementia unit and was sent to the ED for abnormal lab findings including an elevated WBC and elevated liver enzymes  Per ED records, patient's daughter reported that the patient was complaining of generalized abdominal pain the past several days and has reportedly been having diarrhea  No noted nausea/ vomiting  Patient currents denies abdominal pain but does note right hip pain  Patient was recently hospitalized from 9/10-9/12/22 after an unwitnessed fall and received a 3 day course of ceftriaxone for a UTI      Review of Systems:  Review of Systems   Unable to perform ROS: Dementia       Past Medical and Surgical History:   Past Medical History:   Diagnosis Date    Closed displaced fracture of acromial end of left clavicle with routine healing     Disease of thyroid gland     Endometrial cancer ([facility])     Hip pain, left     History of rib fracture     MCI (mild cognitive impairment)     Osteoarthritis of hand     Urinary frequency     Urinary incontinence        Past Surgical History:   Procedure Laterality Date    HYSTERECTOMY         Meds/Allergies:  Prior to Admission medications    Medication Sig Start Date End Date Taking?  Authorizing Provider   acetaminophen (TYLENOL) 325 mg tablet 1,000 mg 2 (two) times a day Take two tablets by mouth every 6 hours as needed    Historical Provider, MD   calcium carbonate (OS-CHLOÉ) 600 MG tablet Take 600 mg by mouth daily    Historical Provider, MD   denosumab (PROLIA) 60 mg/mL Inject under the skin every 6 (six) months 1/23/18   Historical Provider, MD   docusate sodium (COLACE) 100 mg capsule Take 100 mg by mouth daily    Historical Provider, MD   ferrous sulfate 325 (65 Fe) mg tablet Take 325 mg by mouth daily at bedtime    Historical Provider, MD   folic acid (FOLVITE) 430 mcg tablet Take 400 mcg by mouth daily    Historical Provider, MD   gabapentin (NEURONTIN) 100 mg capsule Take 100 mg by mouth daily at bedtime    Historical Provider, MD   levothyroxine 50 mcg tablet Take 50 mcg by mouth daily To take 1 p o  daily on an empty stomach    Historical Provider, MD   mirtazapine (REMERON) 15 mg tablet Take 7 5 mg by mouth daily at bedtime     Historical Provider, MD   Multiple Vitamin (DAILY JAMES) TABS Take by mouth daily    Historical Provider, MD   Nutritional Supplements (ENSURE ACTIVE) LIQD Take by mouth daily  Patient not taking: Reported on 9/10/2022    Historical Provider, MD   potassium citrate (UROCIT-K) 10 mEq Take 1 tablet by mouth 2 (two) times a day 6/3/20   Historical Provider, MD   rOPINIRole (REQUIP) 2 mg tablet Take 1 mg by mouth in the morning 9/27/17   Historical Provider, MD   saccharomyces boulardii (FLORASTOR) 250 mg capsule Take 250 mg by mouth daily    Historical Provider, MD   senna-docusate sodium (Senexon-S) 8 6-50 mg per tablet Take 1 tablet by mouth daily    Historical Provider, MD     I have reveiwed home medications using records provided by [facility]  Allergies: Allergies   Allergen Reactions    Asparagus - Food Allergy     Megestrol      Annotation - [id number]: Tasted bad  Refuses to take   Omeprazole Diarrhea     Annotation - [id number]: 08/30/16 Violet(daughter) report that patient experience diarrhea while on meds       Social History:  Marital Status:    Occupation:   Patient Pre-hospital Living Situation: Florentin Kingsley: [facility]  Patient Pre-hospital Level of Mobility: walks with walker  Patient Pre-hospital Diet Restrictions:   Substance Use History:   Social History     Substance and Sexual Activity   Alcohol Use Yes    Alcohol/week: 7 0 standard drinks    Types: 7 Shots of liquor per week     Social History     Tobacco Use   Smoking Status Current Every Day Smoker    Packs/day: 0 20    Types: Cigarettes   Smokeless Tobacco Never Used     Social History     Substance and Sexual Activity   Drug Use No       Family History:  Family History   Problem Relation Age of Onset    Breast cancer Mother     Diabetes Mother     Multiple sclerosis Father        Physical Exam:     Vitals:   Blood Pressure: (!) 96/47 (09/20/22 2259)  Pulse: 80 (09/20/22 2259)  Temperature: 98 8 °F (37 1 °C) (09/20/22 2259)  Temp Source: Oral (09/20/22 1325)  Respirations: 16 (09/20/22 1900)  Weight - Scale: 42 kg (92 lb 9 5 oz) (09/20/22 2115)  SpO2: 95 % (09/20/22 2259)    Physical Exam  Vitals and nursing note reviewed  Constitutional:       General: She is not in acute distress  Appearance: She is not diaphoretic  HENT:      Head: Normocephalic and atraumatic  Eyes:      Conjunctiva/sclera: Conjunctivae normal    Cardiovascular:      Rate and Rhythm: Normal rate and regular rhythm     Pulmonary: Effort: Pulmonary effort is normal  No respiratory distress  Breath sounds: Normal breath sounds  Abdominal:      General: Bowel sounds are normal       Palpations: Abdomen is soft  Tenderness: There is no abdominal tenderness  Musculoskeletal:      Right lower leg: No edema  Left lower leg: No edema  Skin:     General: Skin is warm and dry  Neurological:      Mental Status: She is alert  Comments: Oriented to person; disoriented to place and time  Psychiatric:         Mood and Affect: Mood normal           Additional Data:     Lab Results:  Results from last 7 days   Lab Units 09/20/22  1537   WBC Thousand/uL 31 54*   HEMOGLOBIN g/dL 15 1   HEMATOCRIT % 44 8   PLATELETS Thousands/uL 242   BANDS PCT % 18*   LYMPHO PCT % 3*   MONO PCT % 7   EOS PCT % 1     Results from last 7 days   Lab Units 09/20/22  1537   SODIUM mmol/L 130*   POTASSIUM mmol/L 4 2   CHLORIDE mmol/L 100   CO2 mmol/L 22   BUN mg/dL 57*   CREATININE mg/dL 1 79*   ANION GAP mmol/L 8   CALCIUM mg/dL 8 8   ALBUMIN g/dL 2 7*   TOTAL BILIRUBIN mg/dL 0 63   ALK PHOS U/L 482*   ALT U/L 42   AST U/L 33   GLUCOSE RANDOM mg/dL 109                 Results from last 7 days   Lab Units 09/20/22  1819 09/20/22  1537   LACTIC ACID mmol/L 1 4  --    PROCALCITONIN ng/ml  --  6 47*       Imaging: Reviewed radiology reports from this admission including: abdominal/pelvic CT  CT abdomen pelvis wo contrast   Final Result by Lisa Sarabia MD (09/20 1745)      Severe colitis  Stable bilateral medullary nephrocalcinosis  Workstation performed: [id number]             EKG and Other Studies Reviewed on Admission:   · EKG: No EKG obtained  ** Please Note: This note has been constructed using a voice recognition system   **

## 2022-09-21 NOTE — ASSESSMENT & PLAN NOTE
· [facility] from [facility] with abnormal lab work, diarrhea and generalized abdominal pain for the past several days  Patient with recent hospitalization after a fall and was treated for a UTI with 3 days of IV ceftriaxone  · CT A/P shows severe colitis  · Received 1 dose of IV Zosyn in the ED  · Continue antibiotics with IV ceftriaxone and PO Flagyl  · Follow-up on stool studies  · Monitor stool output  · Currently denies abdominal pain

## 2022-09-21 NOTE — ASSESSMENT & PLAN NOTE
· POA as evidenced by tachycardia, leukocytosis with WBC 31 54 and bandemia of 18%  · In the setting of colitis  · Lactic acid within normal limits  · Procal elevated to 6 47  Continue to trend  · Received 1 dose of Zosyn in the ED  · Continue antibiotics with ceftriaxone and Flagyl  · Follow-up on blood cultures and stool studies  · Repeat CBC in AM    · Monitor VS closely

## 2022-09-21 NOTE — ASSESSMENT & PLAN NOTE
· Alk phos 482 on admission; no recent LFTs work for comparison  · AST, ALT and T bili within normal limits  · Uncertain etiology  In the setting of acute colitis/ sepsis     · Liver unremarkable on CT scan  · Repeat CMP in AM

## 2022-09-22 PROBLEM — A04.72 C. DIFFICILE COLITIS: Status: ACTIVE | Noted: 2022-09-20

## 2022-09-22 LAB
ANION GAP SERPL CALCULATED.3IONS-SCNC: 6 MMOL/L (ref 4–13)
BASOPHILS # BLD MANUAL: 0 THOUSAND/UL (ref 0–0.1)
BASOPHILS NFR MAR MANUAL: 0 % (ref 0–1)
BUN SERPL-MCNC: 34 MG/DL (ref 5–25)
CALCIUM SERPL-MCNC: 7.7 MG/DL (ref 8.4–10.2)
CHLORIDE SERPL-SCNC: 108 MMOL/L (ref 96–108)
CO2 SERPL-SCNC: 18 MMOL/L (ref 21–32)
CREAT SERPL-MCNC: 1.28 MG/DL (ref 0.6–1.3)
EOSINOPHIL # BLD MANUAL: 0 THOUSAND/UL (ref 0–0.4)
EOSINOPHIL NFR BLD MANUAL: 0 % (ref 0–6)
ERYTHROCYTE [DISTWIDTH] IN BLOOD BY AUTOMATED COUNT: 12.5 % (ref 11.6–15.1)
GFR SERPL CREATININE-BSD FRML MDRD: 37 ML/MIN/1.73SQ M
GLUCOSE SERPL-MCNC: 107 MG/DL (ref 65–140)
HCT VFR BLD AUTO: 42 % (ref 34.8–46.1)
HGB BLD-MCNC: 14.2 G/DL (ref 11.5–15.4)
LACTATE SERPL-SCNC: 1.4 MMOL/L (ref 0.5–2)
LYMPHOCYTES # BLD AUTO: 0.65 THOUSAND/UL (ref 0.6–4.47)
LYMPHOCYTES # BLD AUTO: 2 % (ref 14–44)
MCH RBC QN AUTO: 32.8 PG (ref 26.8–34.3)
MCHC RBC AUTO-ENTMCNC: 33.8 G/DL (ref 31.4–37.4)
MCV RBC AUTO: 97 FL (ref 82–98)
MONOCYTES # BLD AUTO: 2.27 THOUSAND/UL (ref 0–1.22)
MONOCYTES NFR BLD: 7 % (ref 4–12)
NEUTROPHILS # BLD MANUAL: 29.54 THOUSAND/UL (ref 1.85–7.62)
NEUTS BAND NFR BLD MANUAL: 4 % (ref 0–8)
NEUTS SEG NFR BLD AUTO: 87 % (ref 43–75)
PLATELET # BLD AUTO: 235 THOUSANDS/UL (ref 149–390)
PLATELET BLD QL SMEAR: ADEQUATE
PMV BLD AUTO: 10.2 FL (ref 8.9–12.7)
POIKILOCYTOSIS BLD QL SMEAR: PRESENT
POTASSIUM SERPL-SCNC: 3.5 MMOL/L (ref 3.5–5.3)
RBC # BLD AUTO: 4.33 MILLION/UL (ref 3.81–5.12)
RBC MORPH BLD: PRESENT
SODIUM SERPL-SCNC: 132 MMOL/L (ref 135–147)
WBC # BLD AUTO: 32.46 THOUSAND/UL (ref 4.31–10.16)

## 2022-09-22 PROCEDURE — 85007 BL SMEAR W/DIFF WBC COUNT: CPT | Performed by: INTERNAL MEDICINE

## 2022-09-22 PROCEDURE — 99232 SBSQ HOSP IP/OBS MODERATE 35: CPT | Performed by: INTERNAL MEDICINE

## 2022-09-22 PROCEDURE — 83605 ASSAY OF LACTIC ACID: CPT | Performed by: INTERNAL MEDICINE

## 2022-09-22 PROCEDURE — 80048 BASIC METABOLIC PNL TOTAL CA: CPT | Performed by: INTERNAL MEDICINE

## 2022-09-22 PROCEDURE — 85027 COMPLETE CBC AUTOMATED: CPT | Performed by: INTERNAL MEDICINE

## 2022-09-22 RX ORDER — MAGNESIUM HYDROXIDE/ALUMINUM HYDROXICE/SIMETHICONE 120; 1200; 1200 MG/30ML; MG/30ML; MG/30ML
30 SUSPENSION ORAL EVERY 4 HOURS PRN
Status: DISCONTINUED | OUTPATIENT
Start: 2022-09-22 | End: 2022-09-27 | Stop reason: HOSPADM

## 2022-09-22 RX ORDER — HYDROXYZINE HYDROCHLORIDE 25 MG/1
25 TABLET, FILM COATED ORAL ONCE
Status: COMPLETED | OUTPATIENT
Start: 2022-09-22 | End: 2022-09-22

## 2022-09-22 RX ORDER — POTASSIUM CHLORIDE 20 MEQ/1
40 TABLET, EXTENDED RELEASE ORAL ONCE
Status: COMPLETED | OUTPATIENT
Start: 2022-09-22 | End: 2022-09-22

## 2022-09-22 RX ORDER — OXYCODONE HYDROCHLORIDE 5 MG/1
2.5 TABLET ORAL EVERY 8 HOURS PRN
Status: COMPLETED | OUTPATIENT
Start: 2022-09-22 | End: 2022-09-25

## 2022-09-22 RX ORDER — ACETAMINOPHEN 325 MG/1
975 TABLET ORAL EVERY 8 HOURS SCHEDULED
Status: DISCONTINUED | OUTPATIENT
Start: 2022-09-22 | End: 2022-09-26

## 2022-09-22 RX ADMIN — CEFTRIAXONE 1000 MG: 1 INJECTION, SOLUTION INTRAVENOUS at 02:09

## 2022-09-22 RX ADMIN — ALUMINUM HYDROXIDE, MAGNESIUM HYDROXIDE, AND SIMETHICONE 30 ML: 200; 200; 20 SUSPENSION ORAL at 17:35

## 2022-09-22 RX ADMIN — METRONIDAZOLE 500 MG: 500 TABLET ORAL at 06:08

## 2022-09-22 RX ADMIN — GABAPENTIN 100 MG: 100 CAPSULE ORAL at 21:15

## 2022-09-22 RX ADMIN — Medication 125 MG: at 17:37

## 2022-09-22 RX ADMIN — Medication 125 MG: at 06:08

## 2022-09-22 RX ADMIN — MICONAZOLE NITRATE 1 APPLICATION: 20 CREAM TOPICAL at 17:44

## 2022-09-22 RX ADMIN — Medication 125 MG: at 02:09

## 2022-09-22 RX ADMIN — HYDROXYZINE HYDROCHLORIDE 25 MG: 25 TABLET ORAL at 23:23

## 2022-09-22 RX ADMIN — ROPINIROLE 1 MG: 1 TABLET, FILM COATED ORAL at 08:54

## 2022-09-22 RX ADMIN — ACETAMINOPHEN 975 MG: 325 TABLET, FILM COATED ORAL at 21:15

## 2022-09-22 RX ADMIN — POTASSIUM CHLORIDE 40 MEQ: 1500 TABLET, EXTENDED RELEASE ORAL at 08:54

## 2022-09-22 RX ADMIN — HEPARIN SODIUM 5000 UNITS: 5000 INJECTION INTRAVENOUS; SUBCUTANEOUS at 13:19

## 2022-09-22 RX ADMIN — MIRTAZAPINE 7.5 MG: 15 TABLET, FILM COATED ORAL at 21:15

## 2022-09-22 RX ADMIN — MICONAZOLE NITRATE: 20 CREAM TOPICAL at 08:54

## 2022-09-22 RX ADMIN — Medication 125 MG: at 23:23

## 2022-09-22 RX ADMIN — SODIUM CHLORIDE, SODIUM LACTATE, POTASSIUM CHLORIDE, AND CALCIUM CHLORIDE 100 ML/HR: .6; .31; .03; .02 INJECTION, SOLUTION INTRAVENOUS at 00:53

## 2022-09-22 RX ADMIN — HEPARIN SODIUM 5000 UNITS: 5000 INJECTION INTRAVENOUS; SUBCUTANEOUS at 21:26

## 2022-09-22 RX ADMIN — ACETAMINOPHEN 975 MG: 325 TABLET, FILM COATED ORAL at 13:19

## 2022-09-22 RX ADMIN — Medication 125 MG: at 12:02

## 2022-09-22 RX ADMIN — FERROUS SULFATE TAB 325 MG (65 MG ELEMENTAL FE) 325 MG: 325 (65 FE) TAB at 21:18

## 2022-09-22 RX ADMIN — ALUMINUM HYDROXIDE, MAGNESIUM HYDROXIDE, AND SIMETHICONE 30 ML: 200; 200; 20 SUSPENSION ORAL at 21:26

## 2022-09-22 RX ADMIN — FOLIC ACID TAB 400 MCG 400 MCG: 400 TAB at 08:54

## 2022-09-22 RX ADMIN — OXYCODONE HYDROCHLORIDE 2.5 MG: 5 TABLET ORAL at 17:38

## 2022-09-22 RX ADMIN — SODIUM CHLORIDE, SODIUM LACTATE, POTASSIUM CHLORIDE, AND CALCIUM CHLORIDE 100 ML/HR: .6; .31; .03; .02 INJECTION, SOLUTION INTRAVENOUS at 21:38

## 2022-09-22 RX ADMIN — HEPARIN SODIUM 5000 UNITS: 5000 INJECTION INTRAVENOUS; SUBCUTANEOUS at 06:08

## 2022-09-22 RX ADMIN — ACETAMINOPHEN 650 MG: 325 TABLET, FILM COATED ORAL at 02:15

## 2022-09-22 RX ADMIN — LEVOTHYROXINE SODIUM 50 MCG: 50 TABLET ORAL at 06:08

## 2022-09-22 NOTE — PLAN OF CARE
Problem: MOBILITY - ADULT  Goal: Maintain or return to baseline ADL function  Description: INTERVENTIONS:  -  Assess patient's ability to carry out ADLs; assess patient's baseline for ADL function and identify physical deficits which impact ability to perform ADLs (bathing, care of mouth/teeth, toileting, grooming, dressing, etc )  - Assess/evaluate cause of self-care deficits   - Assess range of motion  - Assess patient's mobility; develop plan if impaired  - Assess patient's need for assistive devices and provide as appropriate  - Encourage maximum independence but intervene and supervise when necessary  - Involve family in performance of ADLs  - Assess for home care needs following discharge   - Consider OT consult to assist with ADL evaluation and planning for discharge  - Provide patient education as appropriate  Outcome: Progressing  Goal: Maintains/Returns to pre admission functional level  Description: INTERVENTIONS:  - Perform BMAT or MOVE assessment daily    - Set and communicate daily mobility goal to care team and patient/family/caregiver     - Collaborate with rehabilitation services on mobility goals if consulted  - Out of bed for toileting  - Record patient progress and toleration of activity level   Outcome: Progressing     Problem: Potential for Falls  Goal: Patient will remain free of falls  Description: INTERVENTIONS:  - Educate patient/family on patient safety including physical limitations  - Instruct patient to call for assistance with activity   - Consult OT/PT to assist with strengthening/mobility   - Keep Call bell within reach  - Keep bed low and locked with side rails adjusted as appropriate  - Keep care items and personal belongings within reach  - Initiate and maintain comfort rounds  - Apply yellow socks and bracelet for high fall risk patients  - Consider moving patient to room near nurses station  Outcome: Progressing    Problem: Prexisting or High Potential for Compromised Skin Integrity  Goal: Skin integrity is maintained or improved  Description: INTERVENTIONS:  - Identify patients at risk for skin breakdown  - Assess and monitor skin integrity  - Assess and monitor nutrition and hydration status  - Monitor labs   - Assess for incontinence   - Turn and reposition patient  - Assist with mobility/ambulation  - Relieve pressure over bony prominences  - Avoid friction and shearing  - Provide appropriate hygiene as needed including keeping skin clean and dry  - Evaluate need for skin moisturizer/barrier cream  - Collaborate with interdisciplinary team   - Patient/family teaching  - Consider wound care consult   Outcome: Progressing

## 2022-09-22 NOTE — ASSESSMENT & PLAN NOTE
· POA with Na 130  Likely secondary to poor p o   Intake with electrolyte loss  Continue monitoring  IV hydration

## 2022-09-22 NOTE — ASSESSMENT & PLAN NOTE
Cr above baseline on presentation  · Baseline Cr 1 3-1 5  · Avoid nephrotoxins and hypotension  · Monitor I&Os  · Monitor labs  · Continue gentle hydration

## 2022-09-22 NOTE — PLAN OF CARE
Problem: MOBILITY - ADULT  Goal: Maintain or return to baseline ADL function  Description: INTERVENTIONS:  -  Assess patient's ability to carry out ADLs; assess patient's baseline for ADL function and identify physical deficits which impact ability to perform ADLs (bathing, care of mouth/teeth, toileting, grooming, dressing, etc )  - Assess/evaluate cause of self-care deficits   - Assess range of motion  - Assess patient's mobility; develop plan if impaired  - Assess patient's need for assistive devices and provide as appropriate  - Encourage maximum independence but intervene and supervise when necessary  - Involve family in performance of ADLs  - Assess for home care needs following discharge   - Consider OT consult to assist with ADL evaluation and planning for discharge  - Provide patient education as appropriate  Outcome: Progressing  Goal: Maintains/Returns to pre admission functional level  Description: INTERVENTIONS:  - Perform BMAT or MOVE assessment daily    - Set and communicate daily mobility goal to care team and patient/family/caregiver  - Collaborate with rehabilitation services on mobility goals if consulted  - Perform Range of Motion  times a day  - Reposition patient every  hours    - Dangle patient  times a day  - Stand patient  times a day  - Ambulate patient  times a day  - Out of bed to chair  times a day   - Out of bed for meals  times a day  - Out of bed for toileting  - Record patient progress and toleration of activity level   Outcome: Progressing     Problem: Potential for Falls  Goal: Patient will remain free of falls  Description: INTERVENTIONS:  - Educate patient/family on patient safety including physical limitations  - Instruct patient to call for assistance with activity   - Consult OT/PT to assist with strengthening/mobility   - Keep Call bell within reach  - Keep bed low and locked with side rails adjusted as appropriate  - Keep care items and personal belongings within reach  - Initiate and maintain comfort rounds  - Make Fall Risk Sign visible to staff  - Offer Toileting every  Hours, in advance of need  - Initiate/Maintain alarm  - Obtain necessary fall risk management equipment  - Apply yellow socks and bracelet for high fall risk patients  - Consider moving patient to room near nurses station  Outcome: Progressing     Problem: Prexisting or High Potential for Compromised Skin Integrity  Goal: Skin integrity is maintained or improved  Description: INTERVENTIONS:  - Identify patients at risk for skin breakdown  - Assess and monitor skin integrity  - Assess and monitor nutrition and hydration status  - Monitor labs   - Assess for incontinence   - Turn and reposition patient  - Assist with mobility/ambulation  - Relieve pressure over bony prominences  - Avoid friction and shearing  - Provide appropriate hygiene as needed including keeping skin clean and dry  - Evaluate need for skin moisturizer/barrier cream  - Collaborate with interdisciplinary team   - Patient/family teaching  - Consider wound care consult   Outcome: Progressing     Problem: Nutrition/Hydration-ADULT  Goal: Nutrient/Hydration intake appropriate for improving, restoring or maintaining nutritional needs  Description: Monitor and assess patient's nutrition/hydration status for malnutrition  Collaborate with interdisciplinary team and initiate plan and interventions as ordered  Monitor patient's weight and dietary intake as ordered or per policy  Utilize nutrition screening tool and intervene as necessary  Determine patient's food preferences and provide high-protein, high-caloric foods as appropriate       INTERVENTIONS:  - Monitor oral intake, urinary output, labs, and treatment plans  - Assess nutrition and hydration status and recommend course of action  - Evaluate amount of meals eaten  - Assist patient with eating if necessary   - Allow adequate time for meals  - Recommend/ encourage appropriate diets, oral nutritional supplements, and vitamin/mineral supplements  - Order, calculate, and assess calorie counts as needed  - Assess need for intravenous fluids  - Provide nutrition/hydration education as appropriate  - Include patient/family/caregiver in decisions related to nutrition  Outcome: Progressing     Problem: INFECTION - ADULT  Goal: Absence or prevention of progression during hospitalization  Description: INTERVENTIONS:  - Assess and monitor for signs and symptoms of infection  - Monitor lab/diagnostic results  - Monitor all insertion sites, i e  indwelling lines, tubes, and drains  - Monitor endotracheal if appropriate and nasal secretions for changes in amount and color  - [place] appropriate cooling/warming therapies per order  - Administer medications as ordered  - Instruct and encourage patient and family to use good hand hygiene technique  - Identify and instruct in appropriate isolation precautions for identified infection/condition  Outcome: Progressing  Goal: Absence of fever/infection during neutropenic period  Description: INTERVENTIONS:  - Monitor WBC    Outcome: Progressing     Problem: PAIN - ADULT  Goal: Verbalizes/displays adequate comfort level or baseline comfort level  Description: Interventions:  - Encourage patient to monitor pain and request assistance  - Assess pain using appropriate pain scale  - Administer analgesics based on type and severity of pain and evaluate response  - Implement non-pharmacological measures as appropriate and evaluate response  - Consider cultural and social influences on pain and pain management  - Notify physician/advanced practitioner if interventions unsuccessful or patient reports new pain  Outcome: Progressing

## 2022-09-22 NOTE — ASSESSMENT & PLAN NOTE
· Presented from SNF with abnormal lab work, diarrhea and generalized abdominal pain for the past several days  Patient with recent hospitalization after a fall and was treated for a UTI with 3 days of IV ceftriaxone  · CT A/P shows severe colitis     · Severe C diff-albumin less than 3, white count 37 7, abdominal pain  · Continue IV fluids  · Started on vancomycin oral  · Discontinue other antibiotics

## 2022-09-22 NOTE — ASSESSMENT & PLAN NOTE
· Present on admission  · Currently vital signs stable  · Leukocytosis with bandemia 29%  · In the setting of C diff infection  · Will check for lactic acid  · Pro Justin trending down  · Monitor vital signs, fever curve  · Rest of the plan as in c diff colitis

## 2022-09-22 NOTE — CONSULTS
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

## 2022-09-22 NOTE — DISCHARGE INSTR - OTHER ORDERS
Wound/Skin Care Discharge Plan:   1  Recommend Kike cream twice a day and as needed after incontinence care  2  Continue frequent turning and repositioning  3  Offload heels from mattress surface  5  Pressure redistribution cushion to chair  6  Moisturize skin daily

## 2022-09-22 NOTE — MALNUTRITION/BMI
This medical record reflects one or more clinical indicators suggestive of malnutrition  Malnutrition Findings:   Adult Malnutrition type: Acute illness  Adult Degree of Malnutrition: Other severe protein calorie malnutrition  Malnutrition Characteristics: Muscle loss, Fat loss, Weight loss                  360 Statement: related to inadequate energy intake as evidenced by 10% weight loss in September, severely depressed temples, dark sunken orbital, hollow supraclavicular space, wasted gastrocnemius, wasted interosseous  Int: Diet to advance as medically indicated, medical food supplements with meals  BMI Findings:  Adult BMI Classifications: Underweight < 18 5        Body mass index is 16 02 kg/m²  See Nutrition note dated 9/22/2022 for additional details  Completed nutrition assessment is viewable in the nutrition documentation

## 2022-09-22 NOTE — PROGRESS NOTES
[facility]  Progress Note - Opal Cousins 1933, 80 y o  female MRN: [id number]  Unit/Bed#: S -01 Encounter: [id number]  Primary Care Provider: Thom Kwon MD   Date and time admitted to hospital: 9/20/2022  1:21 PM    * Sepsis [facility])  Assessment & Plan  · Present on admission  · Currently vital signs stable  · Leukocytosis with bandemia 29%  · In the setting of C diff infection  · Will check for lactic acid  · Pro Justin trending down  · Monitor vital signs, fever curve  · Rest of the plan as in c diff colitis    C  difficile colitis  Assessment & Plan  · Presented from SNF with abnormal lab work, diarrhea and generalized abdominal pain for the past several days  Patient with recent hospitalization after a fall and was treated for a UTI with 3 days of IV ceftriaxone  · CT A/P shows severe colitis  · Severe C diff-albumin less than 3, white count 37 7, abdominal pain  · Continue IV fluids  · Started on vancomycin oral  · Discontinue other antibiotics    Hyponatremia  Assessment & Plan  · POA with Na 130  Likely secondary to poor p o  Intake with electrolyte loss  Continue monitoring  IV hydration    Elevated serum alkaline phosphatase level  Assessment & Plan  · Alk phos 482 on admission; no recent LFTs work for comparison  · AST, ALT and T bili within normal limits  · Uncertain etiology  In the setting of acute colitis/ sepsis  · Liver unremarkable on CT scan      Dementia [facility])  Assessment & Plan  · Supportive care  Stage 4 chronic kidney disease ([address] )  Assessment & Plan  Cr above baseline on presentation  · Baseline Cr 1 3-1 5  · Avoid nephrotoxins and hypotension  · Monitor I&Os  · Monitor labs  · Continue gentle hydration  VTE Pharmacologic Prophylaxis:   VTE Score: 5 High Risk (Score >/= 5) - Pharmacological DVT Prophylaxis Ordered: Heparin  Sequential Compression Devices Ordered      Mechanical VTE Prophylaxis in Place: No    Patient Centered Rounds: I have performed bedside rounds with nursing staff today  Discussions with Specialists or Other Care Team Provider: CM, nursing    Education and Discussions with Family / Patient: will update daughter    Current Length of Stay: 2 day(s)    Current Patient Status: Inpatient     Discharge Plan / Estimated Discharge Date: TBD    Code Status: Level 3 - DNAR and DNI      Subjective:   Overnight bowel movements- unable to count  C/o abdominal pain this morning, denies vomiting, nausea  C/o urinary incontinence  No burning sensation with urination    Objective:     Vitals:   Temp (24hrs), Av 4 °F (37 4 °C), Min:98 5 °F (36 9 °C), Max:100 2 °F (37 9 °C)    Temp:  [98 5 °F (36 9 °C)-100 2 °F (37 9 °C)] 99 5 °F (37 5 °C)  HR:  [78-88] 88  Resp:  [16-18] 18  BP: (111-126)/(55-57) 126/56  SpO2:  [93 %-95 %] 93 %  Body mass index is 16 02 kg/m²  Input and Output Summary (last 24 hours): Intake/Output Summary (Last 24 hours) at 2022 0640  Last data filed at 2022 0053  Gross per 24 hour   Intake  49 ml   Output --   Net  49 ml       Physical Exam:     Physical Exam  Vitals and nursing note reviewed  Constitutional:       General: She is not in acute distress  Appearance: She is well-developed  Comments: Appears thin, frail   HENT:      Head: Normocephalic and atraumatic  Eyes:      Conjunctiva/sclera: Conjunctivae normal    Cardiovascular:      Rate and Rhythm: Normal rate and regular rhythm  Heart sounds: Murmur heard  Pulmonary:      Effort: Pulmonary effort is normal  No respiratory distress  Breath sounds: Normal breath sounds  Abdominal:      Palpations: Abdomen is soft  Tenderness: There is abdominal tenderness (Generalized abdominal)  Comments: Hyper active bowel sounds   Musculoskeletal:      Cervical back: Neck supple  Skin:     General: Skin is warm and dry  Capillary Refill: Capillary refill takes 2 to 3 seconds  Neurological:      Mental Status: She is alert  Mental status is at baseline  Additional Data:     Labs:  Results from last 7 days   Lab Units 09/21/22  0431   WBC Thousand/uL 37 73*   HEMOGLOBIN g/dL 13 9   HEMATOCRIT % 41 7   PLATELETS Thousands/uL 228   BANDS PCT % 29*   LYMPHO PCT % 1*   MONO PCT % 4   EOS PCT % 0     Results from last 7 days   Lab Units 09/21/22  0431   SODIUM mmol/L 132*   POTASSIUM mmol/L 3 3*   CHLORIDE mmol/L 105   CO2 mmol/L 18*   BUN mg/dL 49*   CREATININE mg/dL 1 46*   ANION GAP mmol/L 9   CALCIUM mg/dL 8 0*   ALBUMIN g/dL 2 3*   TOTAL BILIRUBIN mg/dL 0 66   ALK PHOS U/L 396*   ALT U/L 34   AST U/L 30   GLUCOSE RANDOM mg/dL 83                 Results from last 7 days   Lab Units 09/21/22  0431 09/20/22  1819 09/20/22  1537   LACTIC ACID mmol/L  --  1 4  --    PROCALCITONIN ng/ml 5 89*  --  6 47*       Imaging: Reviewed radiology reports from this admission including: abdominal/pelvic CT scan    Recent Cultures (last 7 days):     Results from last 7 days   Lab Units 09/21/22  0321 09/20/22  1819   BLOOD CULTURE   --  No Growth at 24 hrs  No Growth at 24 hrs     C DIFF TOXIN B BY PCR  Positive*  --        Lines/Drains:  Invasive Devices  Report    Peripheral Intravenous Line  Duration           Peripheral IV 09/20/22 Left;Upper Arm 1 day                Telemetry:        Last 24 Hours Medication List:   Current Facility-Administered Medications   Medication Dose Route Frequency Provider Last Rate    acetaminophen  650 mg Oral Q8H PRN Aylin Dogulas PA-C      ferrous sulfate  325 mg Oral HS Aylin Douglas PA-C      folic acid  313 mcg Oral Daily Aylinkai Douglas PA-C      gabapentin  100 mg Oral HS Aylin Douglas PA-C      heparin (porcine)  5,000 Units Subcutaneous [facility] Yamilex Alex PA-C      lactated ringers  100 mL/hr Intravenous Continuous Itz Pompa  mL/hr (09/22/22 0053)    levothyroxine  50 mcg Oral Early Morning Aylin Douglas PA-C      mirtazapine  7 5 mg Oral HS Ronny Mcfarland PA-C      LEI ANTIFUNGAL   Topical BID Rell Penn MD      potassium chloride  40 mEq Oral Once Dmitri Carrion MD      rOPINIRole  1 mg Oral Daily Ronny Mcfarland PA-C      vancomycin  125 mg Oral Q6H Shannon Funk MD          Today, Patient Was Seen By: Dmitri Carrion MD    ** Please Note: This note has been constructed using a voice recognition system   **

## 2022-09-23 ENCOUNTER — APPOINTMENT (INPATIENT)
Dept: VASCULAR ULTRASOUND | Facility: HOSPITAL | Age: 87
DRG: 871 | End: 2022-09-23
Payer: MEDICARE

## 2022-09-23 PROBLEM — E87.6 HYPOKALEMIA: Status: ACTIVE | Noted: 2022-09-23

## 2022-09-23 LAB
ANION GAP SERPL CALCULATED.3IONS-SCNC: 7 MMOL/L (ref 4–13)
BUN SERPL-MCNC: 24 MG/DL (ref 5–25)
CALCIUM SERPL-MCNC: 7.6 MG/DL (ref 8.4–10.2)
CHLORIDE SERPL-SCNC: 112 MMOL/L (ref 96–108)
CO2 SERPL-SCNC: 19 MMOL/L (ref 21–32)
CREAT SERPL-MCNC: 1.09 MG/DL (ref 0.6–1.3)
ERYTHROCYTE [DISTWIDTH] IN BLOOD BY AUTOMATED COUNT: 12.6 % (ref 11.6–15.1)
GFR SERPL CREATININE-BSD FRML MDRD: 45 ML/MIN/1.73SQ M
GLUCOSE SERPL-MCNC: 134 MG/DL (ref 65–140)
HCT VFR BLD AUTO: 46.3 % (ref 34.8–46.1)
HGB BLD-MCNC: 15.7 G/DL (ref 11.5–15.4)
MCH RBC QN AUTO: 32.7 PG (ref 26.8–34.3)
MCHC RBC AUTO-ENTMCNC: 33.9 G/DL (ref 31.4–37.4)
MCV RBC AUTO: 97 FL (ref 82–98)
PLATELET # BLD AUTO: 284 THOUSANDS/UL (ref 149–390)
PMV BLD AUTO: 9.9 FL (ref 8.9–12.7)
POTASSIUM SERPL-SCNC: 2.6 MMOL/L (ref 3.5–5.3)
POTASSIUM SERPL-SCNC: 5 MMOL/L (ref 3.5–5.3)
PROCALCITONIN SERPL-MCNC: 3.08 NG/ML
RBC # BLD AUTO: 4.8 MILLION/UL (ref 3.81–5.12)
SODIUM SERPL-SCNC: 138 MMOL/L (ref 135–147)
WBC # BLD AUTO: 22.64 THOUSAND/UL (ref 4.31–10.16)

## 2022-09-23 PROCEDURE — 84132 ASSAY OF SERUM POTASSIUM: CPT | Performed by: INTERNAL MEDICINE

## 2022-09-23 PROCEDURE — 97163 PT EVAL HIGH COMPLEX 45 MIN: CPT

## 2022-09-23 PROCEDURE — 99232 SBSQ HOSP IP/OBS MODERATE 35: CPT | Performed by: INTERNAL MEDICINE

## 2022-09-23 PROCEDURE — 80048 BASIC METABOLIC PNL TOTAL CA: CPT | Performed by: INTERNAL MEDICINE

## 2022-09-23 PROCEDURE — 93971 EXTREMITY STUDY: CPT | Performed by: SURGERY

## 2022-09-23 PROCEDURE — 84145 PROCALCITONIN (PCT): CPT | Performed by: INTERNAL MEDICINE

## 2022-09-23 PROCEDURE — 93971 EXTREMITY STUDY: CPT

## 2022-09-23 PROCEDURE — 85027 COMPLETE CBC AUTOMATED: CPT | Performed by: INTERNAL MEDICINE

## 2022-09-23 RX ORDER — POTASSIUM CHLORIDE 20 MEQ/1
40 TABLET, EXTENDED RELEASE ORAL ONCE
Status: COMPLETED | OUTPATIENT
Start: 2022-09-23 | End: 2022-09-23

## 2022-09-23 RX ORDER — POTASSIUM CHLORIDE 14.9 MG/ML
20 INJECTION INTRAVENOUS
Status: COMPLETED | OUTPATIENT
Start: 2022-09-23 | End: 2022-09-23

## 2022-09-23 RX ORDER — POTASSIUM CHLORIDE 20 MEQ/1
20 TABLET, EXTENDED RELEASE ORAL ONCE
Status: COMPLETED | OUTPATIENT
Start: 2022-09-23 | End: 2022-09-23

## 2022-09-23 RX ADMIN — FERROUS SULFATE TAB 325 MG (65 MG ELEMENTAL FE) 325 MG: 325 (65 FE) TAB at 21:57

## 2022-09-23 RX ADMIN — MIRTAZAPINE 7.5 MG: 15 TABLET, FILM COATED ORAL at 21:57

## 2022-09-23 RX ADMIN — FOLIC ACID TAB 400 MCG 400 MCG: 400 TAB at 08:52

## 2022-09-23 RX ADMIN — Medication 125 MG: at 05:28

## 2022-09-23 RX ADMIN — Medication 125 MG: at 13:00

## 2022-09-23 RX ADMIN — POTASSIUM CHLORIDE 40 MEQ: 1500 TABLET, EXTENDED RELEASE ORAL at 16:02

## 2022-09-23 RX ADMIN — POTASSIUM CHLORIDE 40 MEQ: 1500 TABLET, EXTENDED RELEASE ORAL at 13:52

## 2022-09-23 RX ADMIN — Medication 125 MG: at 17:16

## 2022-09-23 RX ADMIN — HEPARIN SODIUM 5000 UNITS: 5000 INJECTION INTRAVENOUS; SUBCUTANEOUS at 05:27

## 2022-09-23 RX ADMIN — MICONAZOLE NITRATE: 20 CREAM TOPICAL at 09:00

## 2022-09-23 RX ADMIN — MICONAZOLE NITRATE: 20 CREAM TOPICAL at 17:17

## 2022-09-23 RX ADMIN — ROPINIROLE 1 MG: 1 TABLET, FILM COATED ORAL at 08:52

## 2022-09-23 RX ADMIN — HEPARIN SODIUM 5000 UNITS: 5000 INJECTION INTRAVENOUS; SUBCUTANEOUS at 21:57

## 2022-09-23 RX ADMIN — GABAPENTIN 100 MG: 100 CAPSULE ORAL at 21:57

## 2022-09-23 RX ADMIN — ACETAMINOPHEN 975 MG: 325 TABLET, FILM COATED ORAL at 05:27

## 2022-09-23 RX ADMIN — ALUMINUM HYDROXIDE, MAGNESIUM HYDROXIDE, AND SIMETHICONE 30 ML: 200; 200; 20 SUSPENSION ORAL at 14:11

## 2022-09-23 RX ADMIN — POTASSIUM CHLORIDE 20 MEQ: 1500 TABLET, EXTENDED RELEASE ORAL at 17:16

## 2022-09-23 RX ADMIN — ACETAMINOPHEN 975 MG: 325 TABLET, FILM COATED ORAL at 21:57

## 2022-09-23 RX ADMIN — POTASSIUM CHLORIDE 20 MEQ: 14.9 INJECTION, SOLUTION INTRAVENOUS at 16:02

## 2022-09-23 RX ADMIN — SODIUM CHLORIDE, SODIUM LACTATE, POTASSIUM CHLORIDE, AND CALCIUM CHLORIDE 100 ML/HR: .6; .31; .03; .02 INJECTION, SOLUTION INTRAVENOUS at 08:52

## 2022-09-23 RX ADMIN — POTASSIUM CHLORIDE 20 MEQ: 14.9 INJECTION, SOLUTION INTRAVENOUS at 13:54

## 2022-09-23 RX ADMIN — LEVOTHYROXINE SODIUM 50 MCG: 50 TABLET ORAL at 05:27

## 2022-09-23 NOTE — CASE MANAGEMENT
Case Management Progress Note    Patient name Yomi Duval  Location S /S -72 MRN [id number]  : 1933 Date 2022       LOS (days): 3  Geometric Mean LOS (GMLOS) (days): 3 50  Days to GMLOS:0 7        OBJECTIVE:        Current admission status: Inpatient  Preferred Pharmacy:   [facility] - [address] [place] [facility] [address]  Phone: [phone] Fax: [phone]    Primary Care Provider: Kacey Garrett MD    Primary Insurance: MEDICARE  Secondary Insurance: AARP    PROGRESS NOTE:    CM discussed Advance Care Planning with primary provider based on scores of Goals of Care systems list     Pt is currently a Level 3 code status

## 2022-09-23 NOTE — CASE MANAGEMENT
Case Management Assessment    Patient name Joseline Howard  Location S /S -14 MRN [id number]  : 1933 Date 2022       Current Admission Date: 2022  Current Admission Diagnosis:Sepsis [facility])   Patient Active Problem List    Diagnosis Date Noted    C  difficile colitis 2022    Hyponatremia 2022    Elevated serum alkaline phosphatase level 2022    Fall 09/10/2022    Swollen finger 2020    CRF (chronic renal failure), stage 3 (moderate) ([address] ) 10/09/2020    Goals of care, counseling/discussion 10/09/2020    Mild protein-calorie malnutrition ([address] ) 10/07/2020    Physician orders for life-sustaining treatment (POLST) form indicates patient wish for do-not-resuscitate status 10/05/2020    Dementia ([facility] ) 10/05/2020    Closed fracture of olecranon process of left ulna 10/05/2020    Blood in stool 2020    History of laceration of skin 2020    Peripheral edema 05/15/2020    Heel pain, bilateral 2019    UTI (urinary tract infection) 2019    Sepsis ([address] ) 2019    Acute kidney injury superimposed on chronic kidney disease ([address] ) 2019    MCI (mild cognitive impairment) 2019    BMI less than 19,adult 2019    Neck pain 2019    Pressure injury of sacral region, stage 2 ([address] ) 2019    Carpal tunnel syndrome 2019    Dermatitis associated with moisture 2018    Numbness of left hand 2018    Hoarseness or changing voice 10/29/2018    Impacted cerumen of left ear 10/29/2018    Physical deconditioning 2017    Anemia 2017    Ambulatory dysfunction 2017    Diarrhea 2016    Mixed incontinence 10/07/2015    Severe protein-calorie malnutrition ([address] ) 10/07/2015    Back pain 2015    GERD (gastroesophageal reflux disease) 2015    Stage 4 chronic kidney disease ([address] ) 2014    Cognitive impairment 2014    Hearing loss 2014    Constipation 11/26/2014    Depression 11/26/2014    Hypothyroidism 11/26/2014    Osteoporosis 11/26/2014    Osteoarthritis 11/26/2014    RLS (restless legs syndrome) 11/26/2014    First degree atrioventricular block 08/05/2013      LOS (days): 3  Geometric Mean LOS (GMLOS) (days): 3 50  Days to GMLOS:0 8     OBJECTIVE:  PATIENT READMITTED TO HOSPITAL  Risk of Unplanned Readmission Score: 17 67         Current admission status: Inpatient  Referral Reason: Rehab    Preferred Pharmacy:   BR[facility] Molina - [address] [place] [address]   [facility] Dustin Myers  [facility] [address] [facility]  Phone: [phone] Fax: [phone]    Primary Care Provider: Saba Mccracken MD    Primary Insurance: MEDICARE  [facility]:  [facility] [address] [facility] [address]   Primary Phone: [phone] (Work)  Mobile Phone: [phone]  Home Phone: [phone]               Advance Directives  Does patient have a [address]?: Yes  Does patient have Advance Directives?: Yes  Advance Directives: Power of  for health care, Power of  for finance, POLST  Primary Contact: sonSaloni         Readmission Root Cause  30 Day Readmission: Yes  Who directed you to return to the hospital?: Other (comment) (facility staff)  Did you understand whom to contact if you had questions or problems?: Yes  Did you get your prescriptions before you left the hospital?: No  Reason[de-identified] Preference for own pharmacy  Were you able to get your prescriptions filled when you left the hospital?: Yes  Did you take your medications as prescribed?: Yes  Were you able to get to your follow-up appointments?: Yes  During previous admission, was a post-acute recommendation made?: Yes  What post-acute resources were offered?: STR  Patient was readmitted due to: sepsis and need for IV and PO abx  Action Plan: return to [facility] however pt will be in need of rehab upon returning    Patient Information  Admitted from[de-identified] Facility  Mental Status: Confused  During Assessment patient was accompanied by: Not accompanied during assessment  Assessment information provided by[de-identified] Daughter  Primary Caregiver: Other (Comment)  Caregiver's Name[de-identified] [facility]  Caregiver's Relationship to Patient[de-identified] Other (Specify) (staff at facility)  Support Systems: [address] of Residence: [address] [place] [address] do you live in?: [facility] entry access options   Select all that apply : Ramp  Type of Current Residence: Facility  Upon entering residence, is there a bedroom on the main floor (no further steps)?: Yes  Upon entering residence, is there a bathroom on the main floor (no further steps)?: Yes  In the last 12 months, was there a time when you were not able to pay the mortgage or rent on time?: No  In the last 12 months, was there a time when you did not have a steady place to sleep or slept in a shelter (including now)?: No  Living Arrangements: Other (Comment) ( dementia unit)  Is patient a ?: No    Activities of Daily Living Prior to Admission  Functional Status: Assistance  Completes ADLs independently?: No  Level of ADL dependence: Assistance  Ambulates independently?: No  Level of ambulatory dependence: Assistance  Does patient use assisted devices?: Yes  Assisted Devices (DME) used: Tamiko Lyons  Does patient currently own DME?: Yes  What DME does the patient currently own?: Tamiko Lyons  Does patient have a history of Outpatient Therapy (PT/OT)?: No  Does the patient have a history of Short-Term Rehab?: Yes ()  Does patient have a history of HHC?: Yes  Does patient currently have [facility]?: No         Patient Information Continued  Income Source: Pension/shelter  Does patient have prescription coverage?: Yes  Within the past 12 months, you worried that your food would run out before you got the money to buy more : Never true  Within the past 12 months, the food you bought just didn't last and you didn't have money to get more : Never true  Does patient receive dialysis treatments?: No  Does patient have a history of substance abuse?: No  Does patient have a history of Mental Health Diagnosis?: No         Means of Transportation  In the past 12 months, has lack of transportation kept you from medical appointments or from getting medications?: No  In the past 12 months, has lack of transportation kept you from meetings, work, or from getting things needed for daily living?: No

## 2022-09-23 NOTE — CASE MANAGEMENT
Case Management Discharge Planning Note    Patient name Yomi Duval  Location S /S -58 MRN [id number]  : 1933 Date 2022       Current Admission Date: 2022  Current Admission Diagnosis:Sepsis [facility])   Patient Active Problem List    Diagnosis Date Noted    C  difficile colitis 2022    Hyponatremia 2022    Elevated serum alkaline phosphatase level 2022    Fall 09/10/2022    Swollen finger 2020    CRF (chronic renal failure), stage 3 (moderate) ([address] ) 10/09/2020    Goals of care, counseling/discussion 10/09/2020    Mild protein-calorie malnutrition ([address] ) 10/07/2020    Physician orders for life-sustaining treatment (POLST) form indicates patient wish for do-not-resuscitate status 10/05/2020    Dementia ([address] ) 10/05/2020    Closed fracture of olecranon process of left ulna 10/05/2020    Blood in stool 2020    History of laceration of skin 2020    Peripheral edema 05/15/2020    Heel pain, bilateral 2019    UTI (urinary tract infection) 2019    Sepsis ([address] ) 2019    Acute kidney injury superimposed on chronic kidney disease ([address] ) 2019    MCI (mild cognitive impairment) 2019    BMI less than 19,adult 2019    Neck pain 2019    Pressure injury of sacral region, stage 2 ([address] ) 2019    Carpal tunnel syndrome 2019    Dermatitis associated with moisture 2018    Numbness of left hand 2018    Hoarseness or changing voice 10/29/2018    Impacted cerumen of left ear 10/29/2018    Physical deconditioning 2017    Anemia 2017    Ambulatory dysfunction 2017    Diarrhea 2016    Mixed incontinence 10/07/2015    Severe protein-calorie malnutrition ([address] ) 10/07/2015    Back pain 2015    GERD (gastroesophageal reflux disease) 2015    Stage 4 chronic kidney disease ([address] ) 2014    Cognitive impairment 2014    Hearing loss 12/03/2014    Constipation 11/26/2014    Depression 11/26/2014    Hypothyroidism 11/26/2014    Osteoporosis 11/26/2014    Osteoarthritis 11/26/2014    RLS (restless legs syndrome) 11/26/2014    First degree atrioventricular block 08/05/2013      LOS (days): 3  Geometric Mean LOS (GMLOS) (days): 3 50  Days to GMLOS:0 6     OBJECTIVE:  Risk of Unplanned Readmission Score: 17 21         Current admission status: Inpatient   Preferred Pharmacy:   [facility], [address] [place] [address] DR Maryellen CONTI [address] [facility]  Phone: [phone] Fax: [phone]    Primary Care Provider: Lin Bill MD    Primary Insurance: MEDICARE  Secondary Insurance: AARP    DISCHARGE DETAILS:    Discharge planning discussed with[de-identified] daughter  Freedom of Choice: Yes  Comments - Freedom of Choice: CM met with daughter to review the care team recommendations for rehab upon DC  Daughter states she knew pt would need that and since she lives at [facility], she would like her to go to the SNF for rehab  Daughter states she works there and admissions is aware referral will be coming through for pt  Daughter is hoping pt will be discharged over the weekend  CM contacted family/caregiver?: Yes  Were Treatment Team discharge recommendations reviewed with patient/caregiver?: Yes  Did patient/caregiver verbalize understanding of patient care needs?: Yes  Were patient/caregiver advised of the risks associated with not following Treatment Team discharge recommendations?: Yes    Contacts  Patient Contacts: Duncan Junk - daughter  Relationship to Patient[de-identified] Family  Contact Method:  In Person  Reason/Outcome: Continuity of Care, Emergency Contact, Discharge Planning, Referral    [facility]         Is the patient interested in [facility] at discharge?: No    DME Referral Provided  Referral made for DME?: No    Other Referral/Resources/Interventions Provided:  Interventions: Short Term Rehab  Referral Comments: Referral has been made to [facility] SNF as pt is in need of rehab and daughter would like her to go there since she is a resident of [facility]  Pt will moist likely be stable for DC over the weekend      Would you like to participate in our [address] service program?  : No - Declined    Treatment Team Recommendation: Short Term Rehab  Discharge Destination Plan[de-identified] Short Term Rehab

## 2022-09-23 NOTE — ASSESSMENT & PLAN NOTE
Malnutrition Findings:   Adult Malnutrition type: Acute illness  Adult Degree of Malnutrition: Other severe protein calorie malnutrition  Malnutrition Characteristics: Muscle loss, Fat loss, Weight loss       nutrition supplementation            360 Statement: related to inadequate energy intake as evidenced by 10% weight loss in September, severely depressed temples, dark sunken orbital, hollow supraclavicular space, wasted gastrocnemius, wasted interosseous  Int: Diet to advance as medically indicated, medical food supplements with meals  BMI Findings:  Adult BMI Classifications: Underweight < 18 5        Body mass index is 16 02 kg/m²

## 2022-09-23 NOTE — CASE MANAGEMENT
Case Management Progress Note    Patient name Camilla Mason  Location S /S -25 MRN [id number]  : 1933 Date 2022       LOS (days): 3  Geometric Mean LOS (GMLOS) (days): 3 50  Days to GMLOS:0 7        OBJECTIVE:        Current admission status: Inpatient  Preferred Pharmacy:   BRITT Kay - [address] [facility] [address] [place] [facility] [address]  Phone: [phone] Fax: [phone]    Primary Care Provider: Carlos Alberto Connor MD    Primary Insurance: MEDICARE  Secondary Insurance: AARP    PROGRESS NOTE:      Cm attempted to reach out to pt's daughter to review DCP and recommendations from the care team for rehab again prior to returning to the Dementia Unit  CM left a message requesting a return call  CM will continue to follow to assist with needs and DC arrangements

## 2022-09-23 NOTE — PROGRESS NOTES
[facility]  Progress Note - Steve Rowe 1933, 80 y o  female MRN: [id number]  Unit/Bed#: S -01 Encounter: [id number]  Primary Care Provider: Valeria Garcia MD   Date and time admitted to hospital: 9/20/2022  1:21 PM    C  difficile colitis  Assessment & Plan  · Presented from SNF with abnormal lab work, diarrhea and generalized abdominal pain for the past several days  Patient with recent hospitalization after a fall and was treated for a UTI with 3 days of IV ceftriaxone  · CT A/P shows severe colitis  · Severe C diff-albumin less than 3, white count 37 7, abdominal pain  · Continue IV fluids  · Started on vancomycin oral  · Discontinue other antibiotics    Hypokalemia  Assessment & Plan  Repleted  Follow-up on repeat levels    Elevated serum alkaline phosphatase level  Assessment & Plan  · Alk phos 482 on admission; no recent LFTs work for comparison  · AST, ALT and T bili within normal limits  · Uncertain etiology  In the setting of acute colitis/ sepsis  · Liver unremarkable on CT scan      [facility])  Assessment & Plan  · Supportive care  Severe protein-calorie malnutrition ([facility] [address] [address] )  Assessment & Plan  Malnutrition Findings:   Adult Malnutrition type: Acute illness  Adult Degree of Malnutrition: Other severe protein calorie malnutrition  Malnutrition Characteristics: Muscle loss, Fat loss, Weight loss       nutrition supplementation            360 Statement: related to inadequate energy intake as evidenced by 10% weight loss in September, severely depressed temples, dark sunken orbital, hollow supraclavicular space, wasted gastrocnemius, wasted interosseous  Int: Diet to advance as medically indicated, medical food supplements with meals  BMI Findings:  Adult BMI Classifications: Underweight < 18 5        Body mass index is 16 02 kg/m²  Stage 4 chronic kidney disease ([facility] [address] [address] )  Assessment & Plan  Cr above baseline on presentation     · Baseline Cr 1 3-1 5  · Avoid nephrotoxins and hypotension  · Monitor I&Os  · Monitor labs  · Continue gentle hydration  VTE Pharmacologic Prophylaxis:   VTE Score: 5 High Risk (Score >/= 5) - Pharmacological DVT Prophylaxis Ordered: Heparin  Sequential Compression Devices Ordered  Mechanical VTE Prophylaxis in Place: Yes    Patient Centered Rounds: I have performed bedside rounds with nursing staff today  Discussions with Specialists or Other Care Team Provider:  Case management    Education and Discussions with Family / Patient: Updated  (daughter) at bedside  Ants on over the phone    Current Length of Stay: 3 day(s)    Current Patient Status: Inpatient     Discharge Plan / Estimated Discharge Date: 24-48 hours    Code Status: Level 3 - DNAR and DNI      Subjective:   Complaints of abdominal pain, lose bowel movements  No nausea or vomiting  Objective:     Vitals:   Temp (24hrs), Av 7 °F (37 6 °C), Min:99 1 °F (37 3 °C), Max:100 3 °F (37 9 °C)    Temp:  [99 1 °F (37 3 °C)-100 3 °F (37 9 °C)] 99 1 °F (37 3 °C)  HR:  [] 104  Resp:  [16-18] 18  BP: (105-112)/(63-82) 105/63  SpO2:  [93 %] 93 %  Body mass index is 16 02 kg/m²  Input and Output Summary (last 24 hours): Intake/Output Summary (Last 24 hours) at 2022 1643  Last data filed at 2022 2482  Gross per 24 hour   Intake 950 ml   Output --   Net 950 ml       Physical Exam:     Physical Exam  Vitals and nursing note reviewed  Constitutional:       General: She is not in acute distress  Appearance: She is well-developed  Comments: Appears thin, frail   HENT:      Head: Normocephalic and atraumatic  Eyes:      Conjunctiva/sclera: Conjunctivae normal    Cardiovascular:      Rate and Rhythm: Normal rate and regular rhythm  Heart sounds: Murmur heard  Pulmonary:      Effort: Pulmonary effort is normal  No respiratory distress  Breath sounds: Normal breath sounds     Abdominal: Palpations: Abdomen is soft  Tenderness: There is abdominal tenderness (Generalized abdominal)  Comments: Hyper active bowel sounds   Musculoskeletal:      Cervical back: Neck supple  Comments: Left upper extremity edematous, patchy erythema- blanchable   Skin:     General: Skin is warm and dry  Capillary Refill: Capillary refill takes 2 to 3 seconds  Neurological:      Mental Status: She is alert  Mental status is at baseline  Additional Data:     Labs:  Results from last 7 days   Lab Units 09/23/22  1143 09/22/22  0605   WBC Thousand/uL 22 64* 32 46*   HEMOGLOBIN g/dL 15 7* 14 2   HEMATOCRIT % 46 3* 42 0   PLATELETS Thousands/uL 284 235   BANDS PCT %  --  4   LYMPHO PCT %  --  2*   MONO PCT %  --  7   EOS PCT %  --  0     Results from last 7 days   Lab Units 09/23/22  1143 09/22/22  0605 09/21/22  0431   SODIUM mmol/L 138   < > 132*   POTASSIUM mmol/L 2 6*   < > 3 3*   CHLORIDE mmol/L 112*   < > 105   CO2 mmol/L 19*   < > 18*   BUN mg/dL 24   < > 49*   CREATININE mg/dL 1 09   < > 1 46*   ANION GAP mmol/L 7   < > 9   CALCIUM mg/dL 7 6*   < > 8 0*   ALBUMIN g/dL  --   --  2 3*   TOTAL BILIRUBIN mg/dL  --   --  0 66   ALK PHOS U/L  --   --  396*   ALT U/L  --   --  34   AST U/L  --   --  30   GLUCOSE RANDOM mg/dL 134   < > 83    < > = values in this interval not displayed  Results from last 7 days   Lab Units 09/23/22  1143 09/22/22  1329 09/21/22  0431 09/20/22  1819 09/20/22  1537   LACTIC ACID mmol/L  --  1 4  --  1 4  --    PROCALCITONIN ng/ml 3 08*  --  5 89*  --  6 47*       Imaging: No pertinent imaging reviewed  Recent Cultures (last 7 days):     Results from last 7 days   Lab Units 09/21/22  0321 09/20/22  1819   BLOOD CULTURE   --  No Growth at 48 hrs  No Growth at 48 hrs  C DIFF TOXIN B BY PCR  Positive*  --        Lines/Drains:  Invasive Devices  Report    Peripheral Intravenous Line  Duration           Peripheral IV 09/23/22 Right; Lower Forearm <1 day Telemetry:   Telemetry Orders (From admission, onward)             24 Hour Telemetry Monitoring  Continuous x 24 Hours (Telem)        References:    Telemetry Guidelines   Question:  Reason for 24 Hour Telemetry  Answer:  Metabolic/Electrolyte Disturbance with High Probability of Dysrhythmia (K level <3 or >6, or KCL infusion >=10mEq/hr)                    Last 24 Hours Medication List:   Current Facility-Administered Medications   Medication Dose Route Frequency Provider Last Rate    acetaminophen  975 mg Oral Q8H [address] Marily Wiley MD      aluminum-magnesium hydroxide-simethicone  30 mL Oral Q4H PRN Deborah Brown MD      ferrous sulfate  325 mg Oral HS Tilda Gonsalo, CHENTE      folic acid  720 mcg Oral Daily Tilda Gonsalo, PA-LIDYA      gabapentin  100 mg Oral HS Tilda Gonsalo, PA-C      heparin (porcine)  5,000 Units Subcutaneous [facility] Yamilex Alex PA-C      lactated ringers  50 mL/hr Intravenous Continuous Marily Wiley  mL/hr (09/23/22 0852)    levothyroxine  50 mcg Oral Early Morning Tilda Gonsalo, CHENTE      mirtazapine  7 5 mg Oral HS Tilda Gonsalo, PA-C      LEI ANTIFUNGAL   Topical BID Shabnam Haile MD      oxyCODONE  2 5 mg Oral Q8H PRN Deborah Brown MD      potassium chloride  20 mEq Oral Once Marily Wiley MD      potassium chloride  20 mEq Intravenous Q2H Marily Wiley MD 20 mEq (09/23/22 1602)    rOPINIRole  1 mg Oral Daily Yamilex Alex PA-C      vancomycin  125 mg Oral Q6H Fab Chapa MD          Today, Patient Was Seen By: Marily Wiley MD    ** Please Note: This note has been constructed using a voice recognition system   **

## 2022-09-23 NOTE — PLAN OF CARE
Problem: PHYSICAL THERAPY ADULT  Goal: Performs mobility at highest level of function for planned discharge setting  See evaluation for individualized goals  Description: Treatment/Interventions: Functional transfer training, LE strengthening/ROM, Endurance training, Therapeutic exercise, Cognitive reorientation, Patient/family training, Equipment eval/education, Gait training, Bed mobility, Spoke to nursing, Spoke to case management  Equipment Recommended:  (Trial short rolling walker verses mauricio height lightweight wheelchair)       See flowsheet documentation for full assessment, interventions and recommendations  Note: Prognosis: Guarded  Problem List: Decreased endurance, Decreased range of motion, Decreased strength, Impaired balance, Decreased mobility, Pain, Decreased skin integrity, Impaired sensation, Decreased safety awareness, Impaired judgement, Decreased cognition, Decreased coordination, Impaired hearing (underweight, hypersensitivity, fear and retreat)  Assessment: Pt is a 80 y o  female seen for PT evaluation s/p admit to [facility] on 9/20/2022 w/ Sepsis ([facility] [address] )  Order placed for PT  Patient was recently discharged to a nursing facility for rehabilitation for ambulatory dysfunction  Patient was hospitalized earlier this month from 9/10-9/12/22 after an unwitnessed fall  Upon evaluation: Pt required max to dependent assist of 1 for bed mobility it was not appropriate for weight-bearing transfers nor ambulation at this time  Pt's clinical presentation is currently unstable/unpredictable given the functional mobility deficits above, especially (but not limited to) weakness, decreased ROM, pain and decreased functional mobility tolerance, coupled with fall risks including hx of falls, impulsivity, impaired balance and decreased cognition, and combined with medical complications of abnormal H&H, fear/retreat and Critically high WBCs     Recommend continued trials with edge of bed and out of bed mobility as able, potentially using quick move for sit to stand transfers to a target surface or rolling walker--patient will likely require assist of 2 for out of bed mobility to a target surface  TherEx recommended for increasing ROM to promote 90 90 positioning to prepare for transfers  PT Discharge Recommendation: Post acute rehabilitation services    See flowsheet documentation for full assessment

## 2022-09-23 NOTE — PHYSICAL THERAPY NOTE
PHYSICAL THERAPY EVALUATION  NAME:  Joanne Roman  DATE: 09/23/22    AGE:   80 y o  Mrn:   [id number]  Principal problem: Principal Problem:    Sepsis [facility])  Active Problems:    Stage 4 chronic kidney disease ([facility] [place] [address] )    Dementia ([facility] )    C  difficile colitis    Hyponatremia    Elevated serum alkaline phosphatase level      Vitals:    09/22/22 1523 09/22/22 1551 09/22/22 2117 09/23/22 0733   BP:  108/67 112/82 105/63   Pulse:  82 92 104   Resp:  18 16 18   Temp:  98 5 °F (36 9 °C) 100 3 °F (37 9 °C) 99 1 °F (37 3 °C)   TempSrc:       SpO2:  97% 93% 93%   Weight:       Height: 5' 3 75" (1 619 m)          Length Of Stay: 3  Performed at least 2 patient identifiers during session: Name and Epic photo  PHYSICAL THERAPY EVALUATION :    09/23/22 0953   PT Last Visit   PT Visit Date 09/23/22   Note Type   Note type Evaluation   Pain Assessment   Pain Assessment Tool FLACC   Pain Location/Orientation Location: Generalized   Effect of Pain on Daily Activities Limits speed and independence of mobility, limits tactile support   Patient's Stated Pain Goal No pain   Pain Rating: FLACC (Rest) - Face 0   Pain Rating: FLACC (Rest) - Legs 1   Pain Rating: FLACC (Rest) - Activity 1   Pain Rating: FLACC (Rest) - Cry 1   Pain Rating: FLACC (Rest) - Consolability 1   Score: FLACC (Rest) 4   Pain Rating: FLACC (Activity) - Face 2   Pain Rating: FLACC (Activity) - Legs 2   Pain Rating: FLACC (Activity) - Activity 2   Pain Rating: FLACC (Activity) - Cry 2   Pain Rating: FLACC (Activity) - Consolability 2   Score: FLACC (Activity) 10   Restrictions/Precautions   Weight Bearing Precautions Per Order No   Other Precautions Contact/isolation;Hard of hearing;Pain;Bed Alarm; Chair Alarm;Cognitive   Home Living   Type of Home SNF  [facility])   Additional Comments Patient unable to report PLOF    Patient was reportedly   Prior Function   Falls in the last 6 months   (At least 1 documented, patient unable to quantify)   Comments Patient was discharged to a nursing facility for rehabilitation for ambulatory dysfunction  Patient was recently hospitalized from 9/10-9/12/22 after an unwitnessed fall and received a 3 day course of ceftriaxone for a UTI  General   Family/Caregiver Present No   Cognition   Overall Cognitive Status Impaired   Orientation Level Oriented to person   Memory Decreased recall of precautions;Decreased recall of recent events;Decreased short term memory  (Limited recall less than 5 minute)   Following Commands Follows one step commands inconsistently   Subjective   Subjective "I'm cold  [place] I need to spit   "   RUE Assessment   RUE Assessment X  (Sub maximal ROM and MMT testing due to patient has tenderness to palpation, limited command following  Noted limited wrist and finger extension, elbow extension, shoulder flexion/adduction)   LUE Assessment   LUE Assessment X  (Sub maximal ROM and MMT testing due to patient has tenderness to palpation, limited command following  Noted limited wrist and finger extension, elbow extension, shoulder flexion/adduction)   Strength RLE   R Hip Flexion 3/5   R Knee Extension 3+/5   R Ankle Dorsiflexion 3/5   Strength LLE   L Ankle Dorsiflexion 3/5   L Hip Flexion 3/5   L Knee Extension 3+/5   Vision-Basic Assessment   Current Vision Wears glasses all the time   Light Touch   RLE Light Touch Not tested  (Hypersensitivity to touch, inconsistent command following)   LLE Light Touch Not tested  (Hypersensitivity to touch, inconsistent command following)   Bed Mobility   Supine to Sit 2  Maximal assistance   Additional items Assist x 1; Increased time required;Verbal cues;HOB elevated; Bedrails   Sit to Supine 2  Maximal assistance   Additional items Assist x 1; Increased time required;Verbal cues; Bedrails;HOB elevated   Additional Comments Patient dependent for repositioning towards the head of the bed, attempted over multiple times due to patient's resistance to movement while in supine and arching off off of the bed   Transfers   Sit to Stand Unable to assess  (Fear and retreat)   Ambulation/Elevation   Gait pattern Not appropriate   Balance   Static Sitting   (Fluctuates between fair and poor plus over 7 minutes sitting tolerance)   Activity Tolerance   Activity Tolerance Patient limited by pain; Patient limited by fatigue   Medical Staff Made Aware Spoke to [facility] from case management   Nurse Made Aware Spoke to [facility] before and after session   Assessment   Prognosis Guarded   Problem List Decreased endurance;Decreased range of motion;Decreased strength; Impaired balance;Decreased mobility;Pain;Decreased skin integrity; Impaired sensation;Decreased safety awareness; Impaired judgement;Decreased cognition;Decreased coordination; Impaired hearing  (underweight, hypersensitivity, fear and retreat)   Assessment Pt is a 80 y o  female seen for PT evaluation s/p admit to [facility] on 9/20/2022 w/ Sepsis ([facility] [address] )  Order placed for PT  Patient was recently discharged to a nursing facility for rehabilitation for ambulatory dysfunction  Patient was hospitalized earlier this month from 9/10-9/12/22 after an unwitnessed fall  Upon evaluation: Pt required max to dependent assist of 1 for bed mobility it was not appropriate for weight-bearing transfers nor ambulation at this time  Pt's clinical presentation is currently unstable/unpredictable given the functional mobility deficits above, especially (but not limited to) weakness, decreased ROM, pain and decreased functional mobility tolerance, coupled with fall risks including hx of falls, impulsivity, impaired balance and decreased cognition, and combined with medical complications of abnormal H&H, fear/retreat and Critically high WBCs       Recommend continued trials with edge of bed and out of bed mobility as able, potentially using quick move for sit to stand transfers to a target surface or rolling walker--patient will likely require assist of 2 for out of bed mobility to a target surface  TherEx recommended for increasing ROM to promote 90 90 positioning to prepare for transfers  Goals   Patient Goals To lay back down, have less pain, be able to swallow--RN notified about the last two complaints   STG Expiration Date 10/03/22   Short Term Goal #1 Pt will: Perform bed mobility tasks with consistent Min assist to prepare for transfers and reposition in bed  Assess weight-bearing transfers and gait and set goals to decrease burden of care and increase Indep in prior living environment  Increase unsupported sitting tolerance to greater than 10 minutes with fair balance to improve ability to perform upright tasks at home  Increaseankle range of motion to [facility] to promote weight-bearing for transfers   PT Treatment Day 0   Plan   Treatment/Interventions Functional transfer training;LE strengthening/ROM; Endurance training; Therapeutic exercise;Cognitive reorientation;Patient/family training;Equipment eval/education;Gait training;Bed mobility;Spoke to nursing;Spoke to case management   PT Frequency 1-2x/wk   Recommendation   PT Discharge Recommendation Post acute rehabilitation services   Equipment Recommended   (Trial short rolling walker verses mauricio height lightweight wheelchair)   AM-PAC Basic Mobility Inpatient   Turning in Bed Without Bedrails 2   Lying on Back to Sitting on Edge of Flat Bed 1   Moving Bed to Chair 1   Standing Up From Chair 1   Walk in Room 1   Climb 3-5 Stairs 1   Basic Mobility Inpatient Raw Score 7   Turning Head Towards Sound 3   Follow Simple Instructions 2   Low Function Basic Mobility Raw Score 12   Low Function Basic Mobility Standardized Score 18 33   Highest Level Of Mobility   JH-HLM Goal 2: Bed activities/Dependent transfer   JH-HLM Achieved 3: Sit at edge of bed   End of Consult   Patient Position at End of Consult All needs within reach;Bed/Chair alarm activated;Supine   (Please find full objective findings from PT assessment regarding body systems outlined above)  Pt to benefit from continued skilled PT tx while in hospital and upon DC to address deficits as defined above and maximize level of functional mobility  Co-morbidities affecting pt's physical performance at time of assessment include: Closed displaced fracture of acromial end of left clavicle with routine healing, Endometrial cancer, Hip pain, left, History of rib fracture, MCI (mild cognitive impairment), Osteoarthritis of hand, Urinary frequency, Urinary incontinence  Personal factors affecting pt at time of IE include: advanced age, past experience, behavioral pattern, inability to perform IADLs, inability to perform ADLs, inability to ambulate household distances, inability to navigate community distances, limited insight into impairments and recent fall(s)  The patient's [facility] Basic Mobility Inpatient Short Form Raw Score is 7  A Raw Score of less than 16 suggests the patient may benefit from discharge to post-acute rehabilitation services, which DOES coincide with CURRENT above PT recommendations  However please refer to therapist recommendation for discharge planning given other factors that may influence destination  Adapted from Pennie Cash of [facility] 6-Clicks Basic Mobility and Daily Activity Scores With Discharge Destination  Physical Therapy, 2021;101:1-9  DOI: [id number]    Portions of the record may have been created with voice recognition software  Occasional wrong word or "sound a like" substitutions may have occurred due to the inherent limitations of voice recognition software    Read the chart carefully and recognize, using context, where substitutions have occurred

## 2022-09-23 NOTE — PLAN OF CARE
Problem: MOBILITY - ADULT  Goal: Maintain or return to baseline ADL function  Description: INTERVENTIONS:  -  Assess patient's ability to carry out ADLs; assess patient's baseline for ADL function and identify physical deficits which impact ability to perform ADLs (bathing, care of mouth/teeth, toileting, grooming, dressing, etc )  - Assess/evaluate cause of self-care deficits   - Assess range of motion  - Assess patient's mobility; develop plan if impaired  - Assess patient's need for assistive devices and provide as appropriate  - Encourage maximum independence but intervene and supervise when necessary  - Involve family in performance of ADLs  - Assess for home care needs following discharge   - Consider OT consult to assist with ADL evaluation and planning for discharge  - Provide patient education as appropriate  Outcome: Progressing     Problem: Potential for Falls  Goal: Patient will remain free of falls  Description: INTERVENTIONS:  - Educate patient/family on patient safety including physical limitations  - Instruct patient to call for assistance with activity   - Consult OT/PT to assist with strengthening/mobility   - Keep Call bell within reach  - Keep bed low and locked with side rails adjusted as appropriate  - Keep care items and personal belongings within reach  - Initiate and maintain comfort rounds  - Make Fall Risk Sign visible to staff  - Offer Toileting every 2 Hours, in advance of need  - Initiate/Maintain bed alarm  - Obtain necessary fall risk management equipment: bracelet  - Apply yellow socks and bracelet for high fall risk patients  - Consider moving patient to room near nurses station  Outcome: Progressing     Problem: Prexisting or High Potential for Compromised Skin Integrity  Goal: Skin integrity is maintained or improved  Description: INTERVENTIONS:  - Identify patients at risk for skin breakdown  - Assess and monitor skin integrity  - Assess and monitor nutrition and hydration status  - Monitor labs   - Assess for incontinence   - Turn and reposition patient  - Assist with mobility/ambulation  - Relieve pressure over bony prominences  - Avoid friction and shearing  - Provide appropriate hygiene as needed including keeping skin clean and dry  - Evaluate need for skin moisturizer/barrier cream  - Collaborate with interdisciplinary team   - Patient/family teaching  - Consider wound care consult   Outcome: Progressing     Problem: Nutrition/Hydration-ADULT  Goal: Nutrient/Hydration intake appropriate for improving, restoring or maintaining nutritional needs  Description: Monitor and assess patient's nutrition/hydration status for malnutrition  Collaborate with interdisciplinary team and initiate plan and interventions as ordered  Monitor patient's weight and dietary intake as ordered or per policy  Utilize nutrition screening tool and intervene as necessary  Determine patient's food preferences and provide high-protein, high-caloric foods as appropriate       INTERVENTIONS:  - Monitor oral intake, urinary output, labs, and treatment plans  - Assess nutrition and hydration status and recommend course of action  - Evaluate amount of meals eaten  - Assist patient with eating if necessary   - Allow adequate time for meals  - Recommend/ encourage appropriate diets, oral nutritional supplements, and vitamin/mineral supplements  - Order, calculate, and assess calorie counts as needed  - Assess need for intravenous fluids  - Provide nutrition/hydration education as appropriate  - Include patient/family/caregiver in decisions related to nutrition  Outcome: Progressing     Problem: PAIN - ADULT  Goal: Verbalizes/displays adequate comfort level or baseline comfort level  Description: Interventions:  - Encourage patient to monitor pain and request assistance  - Assess pain using appropriate pain scale  - Administer analgesics based on type and severity of pain and evaluate response  - Implement non-pharmacological measures as appropriate and evaluate response  - Consider cultural and social influences on pain and pain management  - Notify physician/advanced practitioner if interventions unsuccessful or patient reports new pain  Outcome: Progressing

## 2022-09-24 PROCEDURE — 99232 SBSQ HOSP IP/OBS MODERATE 35: CPT | Performed by: INTERNAL MEDICINE

## 2022-09-24 RX ADMIN — HEPARIN SODIUM 5000 UNITS: 5000 INJECTION INTRAVENOUS; SUBCUTANEOUS at 05:35

## 2022-09-24 RX ADMIN — GABAPENTIN 100 MG: 100 CAPSULE ORAL at 22:49

## 2022-09-24 RX ADMIN — LEVOTHYROXINE SODIUM 50 MCG: 50 TABLET ORAL at 05:35

## 2022-09-24 RX ADMIN — ROPINIROLE 1 MG: 1 TABLET, FILM COATED ORAL at 08:23

## 2022-09-24 RX ADMIN — FOLIC ACID TAB 400 MCG 400 MCG: 400 TAB at 08:24

## 2022-09-24 RX ADMIN — SODIUM CHLORIDE, SODIUM LACTATE, POTASSIUM CHLORIDE, AND CALCIUM CHLORIDE 50 ML/HR: .6; .31; .03; .02 INJECTION, SOLUTION INTRAVENOUS at 02:26

## 2022-09-24 RX ADMIN — Medication 125 MG: at 18:28

## 2022-09-24 RX ADMIN — ACETAMINOPHEN 975 MG: 325 TABLET, FILM COATED ORAL at 05:35

## 2022-09-24 RX ADMIN — MIRTAZAPINE 7.5 MG: 15 TABLET, FILM COATED ORAL at 22:50

## 2022-09-24 RX ADMIN — FERROUS SULFATE TAB 325 MG (65 MG ELEMENTAL FE) 325 MG: 325 (65 FE) TAB at 22:49

## 2022-09-24 RX ADMIN — Medication 125 MG: at 05:35

## 2022-09-24 RX ADMIN — Medication 125 MG: at 00:54

## 2022-09-24 RX ADMIN — MICONAZOLE NITRATE: 20 CREAM TOPICAL at 08:24

## 2022-09-24 RX ADMIN — HEPARIN SODIUM 5000 UNITS: 5000 INJECTION INTRAVENOUS; SUBCUTANEOUS at 22:50

## 2022-09-24 RX ADMIN — Medication 125 MG: at 12:07

## 2022-09-24 RX ADMIN — HEPARIN SODIUM 5000 UNITS: 5000 INJECTION INTRAVENOUS; SUBCUTANEOUS at 14:53

## 2022-09-24 RX ADMIN — ACETAMINOPHEN 975 MG: 325 TABLET, FILM COATED ORAL at 22:49

## 2022-09-24 RX ADMIN — MICONAZOLE NITRATE: 20 CREAM TOPICAL at 18:28

## 2022-09-24 NOTE — ASSESSMENT & PLAN NOTE
Potassium was supplemented, repeat potassium level up to 5 however specimen hemolyzed  Recheck again in joesph Ball

## 2022-09-24 NOTE — PLAN OF CARE
Problem: MOBILITY - ADULT  Goal: Maintain or return to baseline ADL function  Description: INTERVENTIONS:  -  Assess patient's ability to carry out ADLs; assess patient's baseline for ADL function and identify physical deficits which impact ability to perform ADLs (bathing, care of mouth/teeth, toileting, grooming, dressing, etc )  - Assess/evaluate cause of self-care deficits   - Assess range of motion  - Assess patient's mobility; develop plan if impaired  - Assess patient's need for assistive devices and provide as appropriate  - Encourage maximum independence but intervene and supervise when necessary  - Involve family in performance of ADLs  - Assess for home care needs following discharge   - Consider OT consult to assist with ADL evaluation and planning for discharge  - Provide patient education as appropriate  Outcome: Progressing  Goal: Maintains/Returns to pre admission functional level  Description: INTERVENTIONS:  - Perform BMAT or MOVE assessment daily    - Set and communicate daily mobility goal to care team and patient/family/caregiver  - Collaborate with rehabilitation services on mobility goals if consulted  - Perform Range of Motion  times a day  - Reposition patient every  hours    - Dangle patient  times a day  - Stand patient  times a day  - Ambulate patient  times a day  - Out of bed to chair  times a day   - Out of bed for meals  times a day  - Out of bed for toileting  - Record patient progress and toleration of activity level   Outcome: Progressing     Problem: Potential for Falls  Goal: Patient will remain free of falls  Description: INTERVENTIONS:  - Educate patient/family on patient safety including physical limitations  - Instruct patient to call for assistance with activity   - Consult OT/PT to assist with strengthening/mobility   - Keep Call bell within reach  - Keep bed low and locked with side rails adjusted as appropriate  - Keep care items and personal belongings within reach  - Initiate and maintain comfort rounds  - Make Fall Risk Sign visible to staff  - Offer Toileting every  Hours, in advance of need  - Initiate/Maintain alarm  - Obtain necessary fall risk management equipment  - Apply yellow socks and bracelet for high fall risk patients  - Consider moving patient to room near nurses station  Outcome: Progressing     Problem: Prexisting or High Potential for Compromised Skin Integrity  Goal: Skin integrity is maintained or improved  Description: INTERVENTIONS:  - Identify patients at risk for skin breakdown  - Assess and monitor skin integrity  - Assess and monitor nutrition and hydration status  - Monitor labs   - Assess for incontinence   - Turn and reposition patient  - Assist with mobility/ambulation  - Relieve pressure over bony prominences  - Avoid friction and shearing  - Provide appropriate hygiene as needed including keeping skin clean and dry  - Evaluate need for skin moisturizer/barrier cream  - Collaborate with interdisciplinary team   - Patient/family teaching  - Consider wound care consult   Outcome: Progressing     Problem: Nutrition/Hydration-ADULT  Goal: Nutrient/Hydration intake appropriate for improving, restoring or maintaining nutritional needs  Description: Monitor and assess patient's nutrition/hydration status for malnutrition  Collaborate with interdisciplinary team and initiate plan and interventions as ordered  Monitor patient's weight and dietary intake as ordered or per policy  Utilize nutrition screening tool and intervene as necessary  Determine patient's food preferences and provide high-protein, high-caloric foods as appropriate       INTERVENTIONS:  - Monitor oral intake, urinary output, labs, and treatment plans  - Assess nutrition and hydration status and recommend course of action  - Evaluate amount of meals eaten  - Assist patient with eating if necessary   - Allow adequate time for meals  - Recommend/ encourage appropriate diets, oral nutritional supplements, and vitamin/mineral supplements  - Order, calculate, and assess calorie counts as needed  - Assess need for intravenous fluids  - Provide nutrition/hydration education as appropriate  - Include patient/family/caregiver in decisions related to nutrition  Outcome: Progressing     Problem: INFECTION - ADULT  Goal: Absence or prevention of progression during hospitalization  Description: INTERVENTIONS:  - Assess and monitor for signs and symptoms of infection  - Monitor lab/diagnostic results  - Monitor all insertion sites, i e  indwelling lines, tubes, and drains  - Monitor endotracheal if appropriate and nasal secretions for changes in amount and color  - [place] appropriate cooling/warming therapies per order  - Administer medications as ordered  - Instruct and encourage patient and family to use good hand hygiene technique  - Identify and instruct in appropriate isolation precautions for identified infection/condition  Outcome: Progressing  Goal: Absence of fever/infection during neutropenic period  Description: INTERVENTIONS:  - Monitor WBC    Outcome: Progressing     Problem: PAIN - ADULT  Goal: Verbalizes/displays adequate comfort level or baseline comfort level  Description: Interventions:  - Encourage patient to monitor pain and request assistance  - Assess pain using appropriate pain scale  - Administer analgesics based on type and severity of pain and evaluate response  - Implement non-pharmacological measures as appropriate and evaluate response  - Consider cultural and social influences on pain and pain management  - Notify physician/advanced practitioner if interventions unsuccessful or patient reports new pain  Outcome: Progressing     Problem: GASTROINTESTINAL - ADULT  Goal: Minimal or absence of nausea and/or vomiting  Description: INTERVENTIONS:  - Administer IV fluids if ordered to ensure adequate hydration  - Maintain NPO status until nausea and vomiting are resolved  - Nasogastric tube if ordered  - Administer ordered antiemetic medications as needed  - Provide nonpharmacologic comfort measures as appropriate  - Advance diet as tolerated, if ordered  - Consider nutrition services referral to assist patient with adequate nutrition and appropriate food choices  Outcome: Progressing  Goal: Maintains or returns to baseline bowel function  Description: INTERVENTIONS:  - Assess bowel function  - Encourage oral fluids to ensure adequate hydration  - Administer IV fluids if ordered to ensure adequate hydration  - Administer ordered medications as needed  - Encourage mobilization and activity  - Consider nutritional services referral to assist patient with adequate nutrition and appropriate food choices  Outcome: Progressing  Goal: Maintains adequate nutritional intake  Description: INTERVENTIONS:  - Monitor percentage of each meal consumed  - Identify factors contributing to decreased intake, treat as appropriate  - Assist with meals as needed  - Monitor I&O, weight, and lab values if indicated  - Obtain nutrition services referral as needed  Outcome: Progressing  Goal: Oral mucous membranes remain intact  Description: INTERVENTIONS  - Assess oral mucosa and hygiene practices  - Implement preventative oral hygiene regimen  - Implement oral medicated treatments as ordered  - Initiate Nutrition services referral as needed  Outcome: Progressing     Problem: GENITOURINARY - ADULT  Goal: Maintains or returns to baseline urinary function  Description: INTERVENTIONS:  - Assess urinary function  - Encourage oral fluids to ensure adequate hydration if ordered  - Administer IV fluids as ordered to ensure adequate hydration  - Administer ordered medications as needed  - Offer frequent toileting  - Follow urinary retention protocol if ordered  Outcome: Progressing  Goal: Absence of urinary retention  Description: INTERVENTIONS:  - Assess patients ability to void and empty bladder  - Monitor I/O  - Bladder scan as needed  - Discuss with physician/AP medications to alleviate retention as needed  - Discuss catheterization for long term situations as appropriate  Outcome: Progressing

## 2022-09-24 NOTE — PLAN OF CARE
Problem: Potential for Falls  Goal: Patient will remain free of falls  Description: INTERVENTIONS:  - Educate patient/family on patient safety including physical limitations  - Instruct patient to call for assistance with activity   - Consult OT/PT to assist with strengthening/mobility   - Keep Call bell within reach  - Keep bed low and locked with side rails adjusted as appropriate  - Keep care items and personal belongings within reach  - Initiate and maintain comfort rounds  - Make Fall Risk Sign visible to staff  - Offer Toileting every 2 Hours, in advance of need  - Initiate/Maintain bed alarm  - Obtain necessary fall risk management equipment:   - Apply yellow socks and bracelet for high fall risk patients  - Consider moving patient to room near nurses station  Outcome: Progressing  Problem: MOBILITY - ADULT  Goal: Maintain or return to baseline ADL function  Description: INTERVENTIONS:  -  Assess patient's ability to carry out ADLs; assess patient's baseline for ADL function and identify physical deficits which impact ability to perform ADLs (bathing, care of mouth/teeth, toileting, grooming, dressing, etc )  - Assess/evaluate cause of self-care deficits   - Assess range of motion  - Assess patient's mobility; develop plan if impaired  - Assess patient's need for assistive devices and provide as appropriate  - Encourage maximum independence but intervene and supervise when necessary  - Involve family in performance of ADLs  - Assess for home care needs following discharge   - Consider OT consult to assist with ADL evaluation and planning for discharge  - Provide patient education as appropriate  Outcome: Progressing     Problem: Prexisting or High Potential for Compromised Skin Integrity  Goal: Skin integrity is maintained or improved  Description: INTERVENTIONS:  - Identify patients at risk for skin breakdown  - Assess and monitor skin integrity  - Assess and monitor nutrition and hydration status  - Monitor labs   - Assess for incontinence   - Turn and reposition patient  - Assist with mobility/ambulation  - Relieve pressure over bony prominences  - Avoid friction and shearing  - Provide appropriate hygiene as needed including keeping skin clean and dry  - Evaluate need for skin moisturizer/barrier cream  - Collaborate with interdisciplinary team   - Patient/family teaching  - Consider wound care consult   Outcome: Progressing     Problem: Nutrition/Hydration-ADULT  Goal: Nutrient/Hydration intake appropriate for improving, restoring or maintaining nutritional needs  Description: Monitor and assess patient's nutrition/hydration status for malnutrition  Collaborate with interdisciplinary team and initiate plan and interventions as ordered  Monitor patient's weight and dietary intake as ordered or per policy  Utilize nutrition screening tool and intervene as necessary  Determine patient's food preferences and provide high-protein, high-caloric foods as appropriate       INTERVENTIONS:  - Monitor oral intake, urinary output, labs, and treatment plans  - Assess nutrition and hydration status and recommend course of action  - Evaluate amount of meals eaten  - Assist patient with eating if necessary   - Allow adequate time for meals  - Recommend/ encourage appropriate diets, oral nutritional supplements, and vitamin/mineral supplements  - Order, calculate, and assess calorie counts as needed  - Assess need for intravenous fluids  - Provide nutrition/hydration education as appropriate  - Include patient/family/caregiver in decisions related to nutrition  Outcome: Progressing     Problem: PAIN - ADULT  Goal: Verbalizes/displays adequate comfort level or baseline comfort level  Description: Interventions:  - Encourage patient to monitor pain and request assistance  - Assess pain using appropriate pain scale  - Administer analgesics based on type and severity of pain and evaluate response  - Implement non-pharmacological measures as appropriate and evaluate response  - Consider cultural and social influences on pain and pain management  - Notify physician/advanced practitioner if interventions unsuccessful or patient reports new pain  Outcome: Progressing

## 2022-09-24 NOTE — ASSESSMENT & PLAN NOTE
· [facility]ed from [facility] with abnormal lab work, diarrhea and generalized abdominal pain for the past several days  Patient with recent hospitalization after a fall and was treated for a UTI with 3 days of IV ceftriaxone  · CT A/P shows severe colitis     · Continue IV fluids  Continue oral vancomycin, leukocytosis improving  Patient has no abdominal tenderness today

## 2022-09-25 PROBLEM — R13.10 DYSPHAGIA: Status: ACTIVE | Noted: 2022-09-25

## 2022-09-25 LAB
ALBUMIN SERPL BCP-MCNC: 2.5 G/DL (ref 3.5–5)
ALP SERPL-CCNC: 523 U/L (ref 34–104)
ALT SERPL W P-5'-P-CCNC: 24 U/L (ref 7–52)
ANION GAP SERPL CALCULATED.3IONS-SCNC: 6 MMOL/L (ref 4–13)
AST SERPL W P-5'-P-CCNC: 29 U/L (ref 13–39)
BACTERIA BLD CULT: NORMAL
BACTERIA BLD CULT: NORMAL
BASOPHILS # BLD AUTO: 0.08 THOUSANDS/ΜL (ref 0–0.1)
BASOPHILS NFR BLD AUTO: 1 % (ref 0–1)
BILIRUB DIRECT SERPL-MCNC: 0.12 MG/DL (ref 0–0.2)
BILIRUB SERPL-MCNC: 0.46 MG/DL (ref 0.2–1)
BUN SERPL-MCNC: 18 MG/DL (ref 5–25)
CALCIUM SERPL-MCNC: 8.2 MG/DL (ref 8.4–10.2)
CHLORIDE SERPL-SCNC: 113 MMOL/L (ref 96–108)
CO2 SERPL-SCNC: 22 MMOL/L (ref 21–32)
CREAT SERPL-MCNC: 1.09 MG/DL (ref 0.6–1.3)
EOSINOPHIL # BLD AUTO: 0.15 THOUSAND/ΜL (ref 0–0.61)
EOSINOPHIL NFR BLD AUTO: 1 % (ref 0–6)
ERYTHROCYTE [DISTWIDTH] IN BLOOD BY AUTOMATED COUNT: 13.1 % (ref 11.6–15.1)
GFR SERPL CREATININE-BSD FRML MDRD: 45 ML/MIN/1.73SQ M
GGT SERPL-CCNC: 1180 U/L (ref 5–85)
GLUCOSE SERPL-MCNC: 109 MG/DL (ref 65–140)
HCT VFR BLD AUTO: 43.8 % (ref 34.8–46.1)
HGB BLD-MCNC: 14.3 G/DL (ref 11.5–15.4)
IMM GRANULOCYTES # BLD AUTO: 0.21 THOUSAND/UL (ref 0–0.2)
IMM GRANULOCYTES NFR BLD AUTO: 2 % (ref 0–2)
LYMPHOCYTES # BLD AUTO: 0.75 THOUSANDS/ΜL (ref 0.6–4.47)
LYMPHOCYTES NFR BLD AUTO: 7 % (ref 14–44)
MCH RBC QN AUTO: 32.2 PG (ref 26.8–34.3)
MCHC RBC AUTO-ENTMCNC: 32.6 G/DL (ref 31.4–37.4)
MCV RBC AUTO: 99 FL (ref 82–98)
MONOCYTES # BLD AUTO: 0.91 THOUSAND/ΜL (ref 0.17–1.22)
MONOCYTES NFR BLD AUTO: 9 % (ref 4–12)
NEUTROPHILS # BLD AUTO: 8.57 THOUSANDS/ΜL (ref 1.85–7.62)
NEUTS SEG NFR BLD AUTO: 80 % (ref 43–75)
NRBC BLD AUTO-RTO: 0 /100 WBCS
PLATELET # BLD AUTO: 264 THOUSANDS/UL (ref 149–390)
PMV BLD AUTO: 9.5 FL (ref 8.9–12.7)
POTASSIUM SERPL-SCNC: 3.1 MMOL/L (ref 3.5–5.3)
PROT SERPL-MCNC: 4.8 G/DL (ref 6.4–8.4)
RBC # BLD AUTO: 4.44 MILLION/UL (ref 3.81–5.12)
SODIUM SERPL-SCNC: 141 MMOL/L (ref 135–147)
WBC # BLD AUTO: 10.67 THOUSAND/UL (ref 4.31–10.16)

## 2022-09-25 PROCEDURE — 85025 COMPLETE CBC W/AUTO DIFF WBC: CPT | Performed by: INTERNAL MEDICINE

## 2022-09-25 PROCEDURE — 92610 EVALUATE SWALLOWING FUNCTION: CPT

## 2022-09-25 PROCEDURE — 80076 HEPATIC FUNCTION PANEL: CPT | Performed by: PHYSICIAN ASSISTANT

## 2022-09-25 PROCEDURE — 82977 ASSAY OF GGT: CPT | Performed by: PHYSICIAN ASSISTANT

## 2022-09-25 PROCEDURE — 80048 BASIC METABOLIC PNL TOTAL CA: CPT | Performed by: INTERNAL MEDICINE

## 2022-09-25 PROCEDURE — 99232 SBSQ HOSP IP/OBS MODERATE 35: CPT | Performed by: INTERNAL MEDICINE

## 2022-09-25 PROCEDURE — 99222 1ST HOSP IP/OBS MODERATE 55: CPT | Performed by: STUDENT IN AN ORGANIZED HEALTH CARE EDUCATION/TRAINING PROGRAM

## 2022-09-25 RX ORDER — PANTOPRAZOLE SODIUM 40 MG/1
40 TABLET, DELAYED RELEASE ORAL
Status: DISCONTINUED | OUTPATIENT
Start: 2022-09-26 | End: 2022-09-27 | Stop reason: HOSPADM

## 2022-09-25 RX ORDER — POTASSIUM CHLORIDE 14.9 MG/ML
20 INJECTION INTRAVENOUS ONCE
Status: COMPLETED | OUTPATIENT
Start: 2022-09-25 | End: 2022-09-25

## 2022-09-25 RX ADMIN — OXYCODONE HYDROCHLORIDE 2.5 MG: 5 TABLET ORAL at 10:17

## 2022-09-25 RX ADMIN — ROPINIROLE 1 MG: 1 TABLET, FILM COATED ORAL at 10:17

## 2022-09-25 RX ADMIN — POTASSIUM CHLORIDE 20 MEQ: 14.9 INJECTION, SOLUTION INTRAVENOUS at 18:52

## 2022-09-25 RX ADMIN — MICONAZOLE NITRATE: 20 CREAM TOPICAL at 18:54

## 2022-09-25 RX ADMIN — FOLIC ACID TAB 400 MCG 400 MCG: 400 TAB at 10:20

## 2022-09-25 RX ADMIN — FERROUS SULFATE TAB 325 MG (65 MG ELEMENTAL FE) 325 MG: 325 (65 FE) TAB at 22:47

## 2022-09-25 RX ADMIN — ACETAMINOPHEN 975 MG: 325 TABLET, FILM COATED ORAL at 05:52

## 2022-09-25 RX ADMIN — ACETAMINOPHEN 975 MG: 325 TABLET, FILM COATED ORAL at 22:47

## 2022-09-25 RX ADMIN — GABAPENTIN 100 MG: 100 CAPSULE ORAL at 22:48

## 2022-09-25 RX ADMIN — Medication 125 MG: at 05:52

## 2022-09-25 RX ADMIN — Medication 125 MG: at 13:37

## 2022-09-25 RX ADMIN — HEPARIN SODIUM 5000 UNITS: 5000 INJECTION INTRAVENOUS; SUBCUTANEOUS at 05:52

## 2022-09-25 RX ADMIN — Medication 125 MG: at 18:55

## 2022-09-25 RX ADMIN — HEPARIN SODIUM 5000 UNITS: 5000 INJECTION INTRAVENOUS; SUBCUTANEOUS at 15:55

## 2022-09-25 RX ADMIN — MIRTAZAPINE 7.5 MG: 15 TABLET, FILM COATED ORAL at 22:47

## 2022-09-25 RX ADMIN — HEPARIN SODIUM 5000 UNITS: 5000 INJECTION INTRAVENOUS; SUBCUTANEOUS at 22:48

## 2022-09-25 RX ADMIN — MICONAZOLE NITRATE: 20 CREAM TOPICAL at 10:20

## 2022-09-25 RX ADMIN — Medication 125 MG: at 23:53

## 2022-09-25 RX ADMIN — LEVOTHYROXINE SODIUM 50 MCG: 50 TABLET ORAL at 05:52

## 2022-09-25 RX ADMIN — ACETAMINOPHEN 975 MG: 325 TABLET, FILM COATED ORAL at 15:55

## 2022-09-25 NOTE — ASSESSMENT & PLAN NOTE
Potassium was supplemented, repeat potassium level up to 5 however specimen hemolyzed    No lab work from today

## 2022-09-25 NOTE — CONSULTS
Consultation -[facility]   Aren Cola 80 y o  female MRN: [id number]    Unit/Bed#: S -01 Encounter: [id number]      Physician Requesting Consult: Dr Amber Fierro    Reason for Consult / Principal Problem: dysphagia      HPI:  This is an 51-year-old female who was admitted on 09/20 and noted to have C diff colitis  Patient had abnormal lab work diarrhea and generalized abdominal pain and had recently been treated for UTI with IV ceftriaxone and CT scan of the abdomen and pelvis had shown severe colitis and has been on oral vancomycin  Leukocytosis was present on admission and has improved since hospitalization and repeat labs from today are pending  Patient also complaining of difficulty swallowing, therefore we have been consulted  Patient was also noted to have isolated elevated alkaline phosphatase on admission  Patient can give no meaningful history, does have history of mild cognitive impairment, 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  According to speech note there was belching and complaining of feeling air was swallowing in vomiting after trials of p o  And was pointing to her esophageal area and complaining of pain  Patient otherwise was noted to have mild oropharyngeal dysphagia  Speech had recommended to keep the patient NPO and plan for GI consultation  Patient had a tray of clear liquids in front of her but states that she was having difficulty drinking the liquids because of her hands  Allergies: Allergies   Allergen Reactions    Asparagus - Food Allergy     Megestrol      Annotation - [id number]: Tasted bad  Refuses to take      Omeprazole Diarrhea     Annotation - [id number]: 08/30/16 Violet(daughter) report that patient experience diarrhea while on meds       Medications:  Current Facility-Administered Medications:     acetaminophen (TYLENOL) tablet 975 mg, 975 mg, Oral, Q8H [address], Mathew Srinivasan MD, 975 mg at 09/25/22 0544   aluminum-magnesium hydroxide-simethicone (MYLANTA) oral suspension 30 mL, 30 mL, Oral, Q4H PRN, Arelis Palm MD, 30 mL at 09/23/22 1411    ferrous sulfate tablet 325 mg, 325 mg, Oral, HS, Yamilex Alex PA-C, 325 mg at 40/17/85 5065    folic acid (FOLVITE) tablet 400 mcg, 400 mcg, Oral, Daily, Yamilex Alex PA-C, 400 mcg at 09/24/22 5443    gabapentin (NEURONTIN) capsule 100 mg, 100 mg, Oral, HS, Yamilex Alex PA-C, 100 mg at 09/24/22 2249    heparin (porcine) subcutaneous injection 5,000 Units, 5,000 Units, Subcutaneous, Q8H [address], 5,000 Units at 09/25/22 0552 **AND** [COMPLETED] Platelet count, , , Once, Glorious CHENTE Morelos    lactated ringers infusion, 50 mL/hr, Intravenous, Continuous, Iveth Rivas MD, Last Rate: 50 mL/hr at 09/24/22 0226, 50 mL/hr at 09/24/22 0226    levothyroxine tablet 50 mcg, 50 mcg, Oral, Early Morning, Yamilex Alex PA-C, 50 mcg at 09/25/22 1105    mirtazapine (REMERON) tablet 7 5 mg, 7 5 mg, Oral, HS, Yamilex Alex PA-C, 7 5 mg at 09/24/22 2250    moisture barrier miconazole 2% cream (aka LEI MOISTURE BARRIER ANTIFUNGAL CREAM), , Topical, BID, Franco Muñoz MD, Given at 09/24/22 1828    oxyCODONE (ROXICODONE) IR tablet 2 5 mg, 2 5 mg, Oral, Q8H PRN, Arelis Palm MD, 2 5 mg at 09/22/22 1738    rOPINIRole (REQUIP) tablet 1 mg, 1 mg, Oral, Daily, Yamilex Alex PA-C, 1 mg at 09/24/22 8742    vancomycin (VANCOCIN) oral solution 125 mg, 125 mg, Oral, Q6H [address], Franco Muñoz MD, 125 mg at 09/25/22 7011    Past Medical history:  Past Medical History:   Diagnosis Date    Closed displaced fracture of acromial end of left clavicle with routine healing     Disease of thyroid gland     Endometrial cancer ([address] )     Hip pain, left     History of rib fracture     MCI (mild cognitive impairment)     Osteoarthritis of hand     Urinary frequency     Urinary incontinence        Past Surgical History:   Past Surgical History:   Procedure Laterality Date    HYSTERECTOMY         Family History:   Family History   Problem Relation Age of Onset   Leslie Heaton Breast cancer Mother     Diabetes Mother     Multiple sclerosis Father        Social history:   Social History     Socioeconomic History    Marital status:      Spouse name: Not on file    Number of children: Not on file    Years of education: Not on file    Highest education level: Not on file   Occupational History    Occupation: retired   Tobacco Use    Smoking status: Current Every Day Smoker     Packs/day: 0 20     Types: Cigarettes    Smokeless tobacco: Never Used   Substance and Sexual Activity    Alcohol use: Yes     Alcohol/week: 7 0 standard drinks     Types: 7 Shots of liquor per week    Drug use: No    Sexual activity: Not on file   Other Topics Concern    Not on file   Social History Narrative    Not on file     Social Determinants of Health     Financial Resource Strain: Not on file   Food Insecurity: No Food Insecurity    Worried About Running Out of Food in the Last Year: Never true    Rebecca of Food in the Last Year: Never true   Transportation Needs: No Transportation Needs    Lack of Transportation (Medical): No    Lack of Transportation (Non-Medical):  No   Physical Activity: Not on file   Stress: Not on file   Social Connections: Not on file   Intimate Partner Violence: Not on file   Housing Stability: Unknown    Unable to Pay for Housing in the Last Year: No    Number of Places Lived in the Last Year: Not on file    Unstable Housing in the Last Year: No       Review of Systems: All other systems were reviewed and were negative, otherwise please refer to HPI    Physical Exam: /62   Pulse 77   Temp 98 8 °F (37 1 °C)   Resp 16   Ht 5' 3 75" (1 619 m) Comment: last ht assessment  Wt 42 kg (92 lb 9 5 oz)   SpO2 94%   BMI 16 02 kg/m²     General Appearance:    Alert, cooperative, no distress, appears stated age   Head:    Normocephalic, without obvious abnormality, atraumatic   Eyes:    No scleral icterus           Mouth:  Mucosa moist   Neck:   Supple, symmetrical, trachea midline, no thyromegaly       Lungs:     Clear to auscultation bilaterally, respirations unlabored       Heart:    Regular rate and rhythm, S1 and S2 normal, no murmur, rub   or gallop     Abdomen:     Soft, non-tender, bowel sounds active all four quadrants,     no masses, no organomegaly   Genitalia:   deferred   Rectal:   deferred   Extremities:   Extremities normal,no cyanosis or edema       Skin:   Skin color, texture, turgor normal, no rashes or lesions       Neurologic:   Grossly intact, no focal deficit           Lab Results: No results found for this or any previous visit (from the past 24 hour(s))  Imaging Studies: CT abdomen pelvis wo contrast    Result Date: 9/20/2022  Narrative: CT ABDOMEN AND PELVIS WITHOUT IV CONTRAST INDICATION:   Abdominal pain, acute, nonlocalized Generalized abdominal pain over the last several days, diarrhea  COMPARISON:  CT scan 5/29/2019 TECHNIQUE:  CT examination of the abdomen and pelvis was performed without intravenous contrast  Axial, sagittal, and coronal 2D reformatted images were created from the source data and submitted for interpretation  Radiation dose length product (DLP) for this visit:  201 mGy-cm   This examination, like all CT scans performed in the [facility], was performed utilizing techniques to minimize radiation dose exposure, including the use of iterative reconstruction and automated exposure control  Enteric contrast was not administered  FINDINGS: ABDOMEN LOWER CHEST:  Bibasilar atelectasis  No pleural or pericardial effusion identified  LIVER/BILIARY TREE:  Unremarkable  GALLBLADDER:  No calcified gallstones  No pericholecystic inflammatory change  SPLEEN:  Unremarkable  PANCREAS:  Unremarkable  ADRENAL GLANDS:  Unremarkable  KIDNEYS/URETERS:  Stable bilateral medullary nephrocalcinosis  No hydronephrosis  STOMACH AND BOWEL:  Severe colonic wall thickening noted particularly the descending colon consistent with colitis  APPENDIX:  No findings to suggest appendicitis  ABDOMINOPELVIC CAVITY:  No ascites  No pneumoperitoneum  No lymphadenopathy  VESSELS:  Unremarkable for patient's age  PELVIS REPRODUCTIVE ORGANS:  Surgical changes of prior hysterectomy  URINARY BLADDER:  Unremarkable  ABDOMINAL WALL/INGUINAL REGIONS:  Unremarkable  OSSEOUS STRUCTURES:  No acute fracture or destructive osseous lesion  Stable degenerative changes in the lumbar spine along with severe scoliosis  Intact right hip arthroplasty  Impression: Severe colitis  Stable bilateral medullary nephrocalcinosis  Workstation performed: [id number]     CT head without contrast    Result Date: 9/10/2022  Narrative: CT BRAIN - WITHOUT CONTRAST INDICATION:   Head trauma, minor (Age >= 65y) Fall with head strike  COMPARISON:  10/5/2020  TECHNIQUE:  CT examination of the brain was performed  In addition to axial images, sagittal and coronal 2D reformatted images were created and submitted for interpretation  Radiation dose length product (DLP) for this visit:  985 mGy-cm   This examination, like all CT scans performed in the [facility], was performed utilizing techniques to minimize radiation dose exposure, including the use of iterative reconstruction and automated exposure control  IMAGE QUALITY:  Diagnostic  FINDINGS: PARENCHYMA: Decreased attenuation is noted in periventricular and subcortical white matter demonstrating an appearance that is statistically most likely to represent moderate microangiopathic change  No CT signs of acute infarction  No intracranial mass, mass effect or midline shift  No acute parenchymal hemorrhage  VENTRICLES AND EXTRA-AXIAL SPACES:  Ventricles and extra-axial CSF spaces are prominent commensurate with the degree of volume loss  No hydrocephalus  No acute extra-axial hemorrhage   VISUALIZED ORBITS AND PARANASAL SINUSES:  Unremarkable  CALVARIUM AND EXTRACRANIAL SOFT TISSUES:  Normal      Impression: No acute intracranial abnormality  Workstation performed: [id number]     CT chest without contrast    Result Date: 9/10/2022  Narrative: CT CHEST WITHOUT IV CONTRAST INDICATION:   Rib fracture suspected, traumatic Left-sided rib pain, concern for fractures  COMPARISON:  11/25/2017  TECHNIQUE: CT examination of the chest was performed without intravenous contrast  Axial, sagittal, and coronal 2D reformatted images were created from the source data and submitted for interpretation  Radiation dose length product (DLP) for this visit:  110 mGy-cm   This examination, like all CT scans performed in the [facility], was performed utilizing techniques to minimize radiation dose exposure, including the use of iterative reconstruction and automated exposure control  FINDINGS: LUNGS:  Stable focal bronchiectasis/scar at the right upper lobe     There is no tracheal or endobronchial lesion  PLEURA:  Unremarkable  HEART/GREAT VESSELS: Heart is unremarkable for patient's age  There is fusiform ectasia of the ascending thoracic aorta measuring up to 40 mm  Recommendation is for follow-up low radiation dose chest CT in one year  MEDIASTINUM AND SUSY:  Unremarkable  CHEST WALL AND LOWER NECK:  Unremarkable  VISUALIZED STRUCTURES IN THE UPPER ABDOMEN:  Medullary nephrocalcinosis lesion is again seen in the bilateral kidneys    OSSEOUS STRUCTURES:  No acute fracture or destructive osseous lesion  Chronic left rib fractures  Impression: No evidence of acute traumatic injury throughout the chest Fusiform ectasia of ascending thoracic aorta measuring 45 mm or less in greatest caliber  Recommendation is for follow-up low radiation dose noncontrast chest CT in one year   Workstation performed: [id number]     CT cervical spine without contrast    Result Date: 9/10/2022  Narrative: CT CERVICAL SPINE - WITHOUT CONTRAST INDICATION:   Fall with C-spine pain  COMPARISON:  None  TECHNIQUE:  CT examination of the cervical spine was performed without intravenous contrast   Contiguous axial images were obtained  Sagittal and coronal reconstructions were performed  Radiation dose length product (DLP) for this visit:  195 mGy-cm   This examination, like all CT scans performed in the [facility], was performed utilizing techniques to minimize radiation dose exposure, including the use of iterative reconstruction and automated exposure control  IMAGE QUALITY:  Diagnostic  FINDINGS: ALIGNMENT:  There is reversal of normal cervical lordosis  Grade 1 retrolisthesis of C2 on C3 and C3 on C4  Cathlean Freddy No compression deformity  VERTEBRAL BODIES:  No fracture  DEGENERATIVE CHANGES:  Severe multilevel cervical degenerative changes are noted  No critical central canal stenosis  PREVERTEBRAL AND PARASPINAL SOFT TISSUES:  Unremarkable  THORACIC INLET:  Please refer to the concurrent chest CT report for description of the thoracic inlet findings  Impression: No cervical spine fracture or traumatic malalignment  Workstation performed: [id number]     CT lumbar spine without contrast    Result Date: 9/10/2022  Narrative: CT LUMBAR SPINE INDICATION:   Low back pain, trauma Fall L-spine pain  COMPARISON: None  TECHNIQUE:  Contiguous axial images through the lumbar spine were obtained  Sagittal and coronal reconstructions were performed  Radiation dose length product (DLP) for this visit:  478 mGy-cm   This examination, like all CT scans performed in the [facility], was performed utilizing techniques to minimize radiation dose exposure, including the use of iterative reconstruction and automated exposure control  IMAGE QUALITY:  Diagnostic  FINDINGS: ALIGNMENT:  There are 5 lumbar type vertebral bodies  Severe scoliosis     No spondylolysis or spondylolisthesis  VERTEBRAL BODIES:  No fracture  No lytic or blastic lesion  DEGENERATIVE CHANGES: Lower Thoracic spine:  Normal lower thoracic disc spaces  L1-2:  Complete loss of disc space height with endplate spondylosis and osteophytic ridging  Normal facet joints  No canal or foraminal stenosis  L2-3:  L2-L3 ankylosis  Mild bilateral facet arthrosis  No canal stenosis  Left neural foraminal stenosis  L3-4:  Complete loss of disc space height with endplate spondylosis and osteophytic ridging  Right facet arthrosis  Left subarticular disc protrusion results in effacement of the left lateral recess  Left neural foraminal stenosis    L4-5:  Complete loss of disc space height with endplate spondylosis and osteophytic ridging  Hypertrophy ligamentum flavum  Right greater than left facet arthrosis  Moderate canal stenosis  Right neural foraminal stenosis  L5-S1:  Normal disc height  Diffuse disc bulge  Mild bilateral facet arthrosis No canal or foraminal stenosis  PARASPINAL SOFT TISSUES:   Normal      Impression: No evidence of acute traumatic injury throughout the lumbar spine  Multilevel degenerative changes as detailed above including left neural foraminal stenosis at the L2-L3 and L3-L4 levels and right neural foraminal stenosis at the L4-L5 level  Effacement of the left lateral recess at L3-L4 may result in impingement upon  the descending left L4 nerve root  Moderate canal stenosis at L4-L5    Workstation performed: [id number]     CT recon only thoracic spine (no charge)    Result Date: 9/10/2022  Narrative: CT THORACIC SPINE INDICATION:   Mid-back pain, compression fracture suspected pain Fall, T-spine pain  COMPARISON:  11/25/2017 TECHNIQUE: Axial CT examination of the thoracic spine was obtained utilizing reconstructed images from CT of the chest abdomen and pelvis performed the same day  Images were reformatted in the sagittal and coronal planes   This examination, like all CT scans performed in the [facility], was performed utilizing techniques to minimize radiation dose exposure, including the use of iterative reconstruction and automated exposure control  FINDINGS: ALIGNMENT: Normal alignment of thoracic vertebral bodies  No subluxation  VERTEBRAL BODIES: No fracture  DEGENERATIVE CHANGES: Moderate multilevel degenerative disc disease  PREVERTEBRAL AND PARASPINAL SOFT TISSUES: Normal      Impression: No fracture or traumatic subluxation  Workstation performed: [id number]     VAS upper limb venous duplex scan, unilateral/limited    Result Date: 9/24/2022  Narrative:  [facility] REPORT CLINICAL: Indications: Patient presents with left upper extremity edema x several days  Operative History: No cardiovascular surgeries noted Risk Factors The patient has history of CKD and smoking (current) 0 2 ppd  FINDINGS:  Right                             Impression                Cephalic Vein - Forearm Proximal  Chronic Thrombophlebitis  Ceph Mid Forearm                  Chronic Phlebitis            CONCLUSION: Impression  RIGHT UPPER LIMB LIMITED: Evaluation shows no evidence of thrombus in the internal jugular vein, subclavian vein, and the brachiocephalic vein  LEFT UPPER LIMB: No evidence of acute or chronic deep vein thrombosis  Evidence of chronic focal chronic superficial thrombophlebitis noted in the cephalic vein at the proximal forearm and chronic phlebitis in the cephalic vein at the mid forearm  Doppler evaluation shows a normal response to augmentation maneuvers  Technical findings were faxed to chart in [address] [place] [address] Rd  SIGNATURE: Electronically Signed by: Kati Olivas MD, RPVI on 2022-09-24 12:10:39 AM    US bedside procedure    Result Date: 9/20/2022  Narrative: [phone] [id number]  [id number] 1      Assessment/Plan:   1) dysphagia  44-year-old female who was noted to have C diff and apparently expressed difficulty swallowing and painful swallowing to speech therapy    Patient currently was trying to drink clear liquids while I was there but was denying any difficulty swallowing to me  Will start on trial of PPI  Could consider further evaluation with esophagram and/or EGD if continued esophageal concerns  Could have esophageal dysmotility/ presbyesophagus due to advanced age  Will discuss with nursing on patient's ongoing symptomatology since history is difficult to obtain from her  Currently still ordered for clear liquid diet  Thank you for the consultation  Case was discussed with Dr So Arias

## 2022-09-25 NOTE — SPEECH THERAPY NOTE
Speech Language/Pathology  Speech/Language Pathology  Assessment    Patient Name: Matias Henderson  Today's Date: 9/25/2022     Problem List  Principal Problem:    Sepsis [facility])  Active Problems:    Stage 4 chronic kidney disease ([facility] [address] )    Severe protein-calorie malnutrition ([facility] )    Dementia (HCC)    C  difficile colitis    Hyponatremia    Elevated serum alkaline phosphatase level    Hypokalemia    Past Medical History  Past Medical History:   Diagnosis Date    Closed displaced fracture of acromial end of left clavicle with routine healing     Disease of thyroid gland     [facility]     Hip pain, left     History of rib fracture     MCI (mild cognitive impairment)     Osteoarthritis of hand     Urinary frequency     Urinary incontinence      Past Surgical History  Past Surgical History:   Procedure Laterality Date    HYSTERECTOMY              Bedside Swallow Evaluation:    Summary:  Pt presented w/ functional oral swallowing skills as characterized by timely mastication, bolus formation, and control  Pt's transfer is timely, but intermittently, pt will hold the clear liquids in her mouth to swish around, and can have some delay in transfer  Pt noted to have one cough following sip of ensure after holding it in her mouth with some premature spillage suspected  Following the cough, pt with belching, vomiting, and gagging  Trials discontinued  Pt also had consistent belching and report of pain near her esophageal area  Spoke with nursing and her doctors regarding a GI consult due to esophageal concerns  Will make NPO for now and follow-up after GI to determine LRD  Recommendations:  Diet: NPO  Liquid: NPO  Meds: can do sips of thin water c meds   Supervision: Full c meds   Positioning:Upright  Strategies: Slow rate, small sips, cue pt to swallow (tends to hold liquid/swish it around in her mouth)  Pt to take PO/Meds only when fully alert and upright     Oral care: to be completed daily Aspiration precautions  Reflux precautions  Therapy Prognosis: Guarded   Prognosis considerations: medical status, cognitive status   Frequency:  F/u 2-3x after GI consult     Consider consult w/:  GI    Goal(s):  Pt will tolerate least restrictive diet w/out s/s aspiration or oral/pharyngeal difficulties  H&P/Admit info/ pertinent provider notes: (PMH noted above)  Jorge Tong is a 80 y o  female with a PMH of dementia, CKD stage 4, hypothyroidism and RLS who presents with abnormal lab work and generalized abdominal pain, diarrhea  History was obtained from the patient's records due to her history of dementia  Patient resides at [facility] dementia unit and was sent to the ED for abnormal lab findings including an elevated WBC and elevated liver enzymes  Per ED records, patient's daughter reported that the patient was complaining of generalized abdominal pain the past several days and has reportedly been having diarrhea  No noted nausea/ vomiting  Patient currents denies abdominal pain but does note right hip pain  Patient was recently hospitalized from 9/10-9/12/22 after an unwitnessed fall and received a 3 day course of ceftriaxone for a UTI  Special Studies:  CT abdomen pelvis wo contrast: 09/20/22  IMPRESSION:  Severe colitis  Stable bilateral medullary nephrocalcinosis  Previous VBS:  None in the records    Patient's goal:  She wants to swallow w/o pain/vomitting     Did the pt report pain?   Yes, nursing and doctor notified     Reason for consult:  R/o aspiration  Determine safest and least restrictive diet  C/o liquid dysphagia    Precautions:  Strict contact and hand hygiene     Food allergies:  Asparagus     Current diet:  Clear liquid     Premorbid diet[de-identified]  Suspect regular and thin, pt is a poor historian, not noted otherwise in records    O2 requirement:  RA    Voice/Speech:  Minimally hoarse, but intelligible     Social/Prior living environment:  [facility] dementia unit     Follows commands:  With cues, but able to follow most basic commands                           Cognitive Status:  H/o of dementia     Oral [facility] exam:  Dentition: wears upper and lower dentures   Lips (VII): generalized weakness   Tongue (XII): adequate ROM, but does have some generalized weakness   Mandible (V): adequate ROM  Face/oral sensation (V): WFL  Velum (X): unable to fully visualize   Secretion management: pt reports pain even with secretions  Volitional cough: fair   Volitional swallow: adequate       Items administered:  Clear liquids (thin apple juice, clear ensure liquid, and jello)    Oral stage:  WFL-mild   Lip closure: WFL  Mastication: adequate with the jello   Bolus formation: WFL  Bolus control: good   Transfer: Timely, but does have an intermittent delay, when she will swish/hold the liquid in her mouth      Oral residue: none   Pocketing: none     Pharyngeal stage:  Mild   Swallow promptness: fairly prompt   Laryngeal rise: palpated and judged to be functional for patient   Wet voice: none   Throat clear: none   Cough: x1 after swishing/holding the ensure in her oral cavity   Secondary swallows: none   Audible swallows: none       Esophageal stage:  Belching   C/o of feeling air when swallowing   Vomiting after trials of PO   Pointing to her esophageal area and c/o of pain         Results d/w:  Pt, nursing, physician

## 2022-09-25 NOTE — ASSESSMENT & PLAN NOTE
Patient was noted to have difficulty swallowing and painful swallowing    Unable to be completely assessed by speech and Swallow   Complained of belching, chest discomfort with swallowing  No prior EGD  No abnormal mass noted on CT scan  Will consult GI

## 2022-09-25 NOTE — PROGRESS NOTES
[facility]  Progress Note - Maria Esther Ovalles 1933, 80 y o  female MRN: [id number]  Unit/Bed#: S -01 Encounter: [id number]  Primary Care Provider: Kevin Chairez MD   Date and time admitted to hospital: 9/20/2022  1:21 PM    C  difficile colitis  Assessment & Plan  Continue oral vancomycin  Patient clinically improving    Dysphagia  Assessment & Plan  Patient was noted to have difficulty swallowing and painful swallowing  Unable to be completely assessed by speech and Swallow   Complained of belching, chest discomfort with swallowing  No prior EGD  No abnormal mass noted on CT scan  Will consult GI    Hypokalemia  Assessment & Plan  Potassium was supplemented, repeat potassium level up to 5 however specimen hemolyzed  No lab work from today    Elevated serum alkaline phosphatase level  Assessment & Plan  · Alk phos 482 on admission; no recent LFTs work for comparison  · AST, ALT and T bili within normal limits  · Uncertain etiology  In the setting of acute colitis/ sepsis  · Liver unremarkable on CT scan      Dementia [facility])  Assessment & Plan  · Supportive care  Severe protein-calorie malnutrition ([facility] [address] )  Assessment & Plan  Malnutrition Findings:   Adult Malnutrition type: Acute illness  Adult Degree of Malnutrition: Other severe protein calorie malnutrition  Malnutrition Characteristics: Muscle loss, Fat loss, Weight loss       nutrition supplementation            360 Statement: related to inadequate energy intake as evidenced by 10% weight loss in September, severely depressed temples, dark sunken orbital, hollow supraclavicular space, wasted gastrocnemius, wasted interosseous  Int: Diet to advance as medically indicated, medical food supplements with meals  BMI Findings:  Adult BMI Classifications: Underweight < 18 5        Body mass index is 16 02 kg/m²               VTE Pharmacologic Prophylaxis:   VTE Score: 5 High Risk (Score >/= 5) - Pharmacological DVT Prophylaxis Ordered: Heparin  Sequential Compression Devices Ordered  Mechanical VTE Prophylaxis in Place: Yes    Patient Centered Rounds: I have performed bedside rounds with nursing staff today  Discussions with Specialists or Other Care Team Provider:  GI, speech, nursing    Education and Discussions with Family / Patient: Updated  (son) via phone  Current Length of Stay: 5 day(s)    Current Patient Status: Inpatient     Discharge Plan / Estimated Discharge Date: TBD    Code Status: Level 3 - DNAR and DNI      Subjective:   C/o pain with swallowing, arm pain  Denies abdominal pain, stated her BM have improved    Objective:     Vitals:   Temp (24hrs), Av 6 °F (37 °C), Min:98 4 °F (36 9 °C), Max:98 8 °F (37 1 °C)    Temp:  [98 4 °F (36 9 °C)-98 8 °F (37 1 °C)] 98 8 °F (37 1 °C)  HR:  [77-88] 77  Resp:  [16] 16  BP: (107-110)/(62-68) 110/62  SpO2:  [93 %-94 %] 94 %  Body mass index is 16 02 kg/m²  Input and Output Summary (last 24 hours):     No intake or output data in the 24 hours ending 22 1519    Physical Exam:     Physical Exam  Vitals and nursing note reviewed  Constitutional:       General: She is not in acute distress  Appearance: She is well-developed  Comments: Appears thin, frail   HENT:      Head: Normocephalic and atraumatic  Eyes:      Conjunctiva/sclera: Conjunctivae normal    Cardiovascular:      Rate and Rhythm: Normal rate and regular rhythm  Heart sounds: Murmur heard  Pulmonary:      Effort: Pulmonary effort is normal  No respiratory distress  Breath sounds: Normal breath sounds  Abdominal:      Palpations: Abdomen is soft  Musculoskeletal:      Cervical back: Neck supple  Comments: Left upper extremity edema improving   Skin:     General: Skin is warm and dry  Capillary Refill: Capillary refill takes 2 to 3 seconds  Neurological:      Mental Status: She is alert  Mental status is at baseline           Additional Data: Labs:  Results from last 7 days   Lab Units 22  1143 22  0605   WBC Thousand/uL 22 64* 32 46*   HEMOGLOBIN g/dL 15 7* 14 2   HEMATOCRIT % 46 3* 42 0   PLATELETS Thousands/uL 284 235   BANDS PCT %  --  4   LYMPHO PCT %  --  2*   MONO PCT %  --  7   EOS PCT %  --  0     Results from last 7 days   Lab Units 22  1945 22  1143 22  0605 22  0431   SODIUM mmol/L  --  138   < > 132*   POTASSIUM mmol/L 5 0 2 6*   < > 3 3*   CHLORIDE mmol/L  --  112*   < > 105   CO2 mmol/L  --  19*   < > 18*   BUN mg/dL  --  24   < > 49*   CREATININE mg/dL  --  1 09   < > 1 46*   ANION GAP mmol/L  --  7   < > 9   CALCIUM mg/dL  --  7 6*   < > 8 0*   ALBUMIN g/dL  --   --   --  2 3*   TOTAL BILIRUBIN mg/dL  --   --   --  0 66   ALK PHOS U/L  --   --   --  396*   ALT U/L  --   --   --  34   AST U/L  --   --   --  30   GLUCOSE RANDOM mg/dL  --  134   < > 83    < > = values in this interval not displayed  Results from last 7 days   Lab Units 22  1143 22  1329 22  0431 22  1819 22  1537   LACTIC ACID mmol/L  --  1 4  --  1 4  --    PROCALCITONIN ng/ml 3 08*  --  5 89*  --  6 47*       Imaging: No pertinent imaging reviewed  Recent Cultures (last 7 days):     Results from last 7 days   Lab Units 22  0321 22  1819   BLOOD CULTURE   --  No Growth After 4 Days  No Growth After 4 Days  C DIFF TOXIN B BY PCR  Positive*  --        Lines/Drains:  Invasive Devices  Report    Peripheral Intravenous Line  Duration           Peripheral IV 22 Right; Lower Forearm 2 days                Telemetry:   Telemetry Orders (From admission, onward)             24 Hour Telemetry Monitoring  Continuous x 24 Hours (Telem)           References:    Telemetry Guidelines   Question:  Reason for 24 Hour Telemetry  Answer:  Metabolic/Electrolyte Disturbance with High Probability of Dysrhythmia (K level <3 or >6, or KCL infusion >=10mEq/hr)                    Last 24 Hours Medication List:   Current Facility-Administered Medications   Medication Dose Route Frequency Provider Last Rate    acetaminophen  975 mg Oral Q8H [address] Mathew Srinivasan MD      aluminum-magnesium hydroxide-simethicone  30 mL Oral Q4H PRN Ayah Herrera MD      ferrous sulfate  325 mg Oral HS Arby Seals, PA-C      folic acid  897 mcg Oral Daily Arby Seals, PA-C      gabapentin  100 mg Oral HS Arby Seals, PA-C      heparin (porcine)  5,000 Units Subcutaneous [facility] Yamilex Alex PA-C      lactated ringers  50 mL/hr Intravenous Continuous Mathew Srinivasan MD 50 mL/hr (09/24/22 0226)    levothyroxine  50 mcg Oral Early Morning Arby Seals, PA-C      mirtazapine  7 5 mg Oral HS Arby Seals, PA-C      LEI ANTIFUNGAL   Topical BID Daphney Lao MD      [START ON 9/26/2022] pantoprazole  40 mg Oral Early Morning Ania Tellez PA-C      rOPINIRole  1 mg Oral Daily Arby Seals, CHENTE      vancomycin  125 mg Oral Q6H Radha Joe MD          Today, Patient Was Seen By: Mathew Srinivasan MD    ** Please Note: This note has been constructed using a voice recognition system   **

## 2022-09-26 ENCOUNTER — APPOINTMENT (OUTPATIENT)
Dept: ULTRASOUND IMAGING | Facility: HOSPITAL | Age: 87
DRG: 871 | End: 2022-09-26
Payer: MEDICARE

## 2022-09-26 ENCOUNTER — APPOINTMENT (OUTPATIENT)
Dept: RADIOLOGY | Facility: HOSPITAL | Age: 87
DRG: 871 | End: 2022-09-26
Payer: MEDICARE

## 2022-09-26 LAB
ALBUMIN SERPL BCP-MCNC: 2.2 G/DL (ref 3.5–5)
ALP SERPL-CCNC: 457 U/L (ref 34–104)
ALT SERPL W P-5'-P-CCNC: 21 U/L (ref 7–52)
ANION GAP SERPL CALCULATED.3IONS-SCNC: 3 MMOL/L (ref 4–13)
AST SERPL W P-5'-P-CCNC: 25 U/L (ref 13–39)
BILIRUB SERPL-MCNC: 0.49 MG/DL (ref 0.2–1)
BUN SERPL-MCNC: 17 MG/DL (ref 5–25)
CALCIUM ALBUM COR SERPL-MCNC: 9.4 MG/DL (ref 8.3–10.1)
CALCIUM SERPL-MCNC: 8 MG/DL (ref 8.4–10.2)
CHLORIDE SERPL-SCNC: 117 MMOL/L (ref 96–108)
CO2 SERPL-SCNC: 21 MMOL/L (ref 21–32)
CREAT SERPL-MCNC: 1.06 MG/DL (ref 0.6–1.3)
ERYTHROCYTE [DISTWIDTH] IN BLOOD BY AUTOMATED COUNT: 13.1 % (ref 11.6–15.1)
GFR SERPL CREATININE-BSD FRML MDRD: 46 ML/MIN/1.73SQ M
GLUCOSE SERPL-MCNC: 106 MG/DL (ref 65–140)
HCT VFR BLD AUTO: 40.9 % (ref 34.8–46.1)
HGB BLD-MCNC: 13.3 G/DL (ref 11.5–15.4)
MCH RBC QN AUTO: 32.1 PG (ref 26.8–34.3)
MCHC RBC AUTO-ENTMCNC: 32.5 G/DL (ref 31.4–37.4)
MCV RBC AUTO: 99 FL (ref 82–98)
PLATELET # BLD AUTO: 242 THOUSANDS/UL (ref 149–390)
PMV BLD AUTO: 9.5 FL (ref 8.9–12.7)
POTASSIUM SERPL-SCNC: 3.8 MMOL/L (ref 3.5–5.3)
PROT SERPL-MCNC: 4.2 G/DL (ref 6.4–8.4)
RBC # BLD AUTO: 4.14 MILLION/UL (ref 3.81–5.12)
SODIUM SERPL-SCNC: 141 MMOL/L (ref 135–147)
WBC # BLD AUTO: 12.7 THOUSAND/UL (ref 4.31–10.16)

## 2022-09-26 PROCEDURE — 99232 SBSQ HOSP IP/OBS MODERATE 35: CPT | Performed by: INTERNAL MEDICINE

## 2022-09-26 PROCEDURE — 97167 OT EVAL HIGH COMPLEX 60 MIN: CPT

## 2022-09-26 PROCEDURE — 76705 ECHO EXAM OF ABDOMEN: CPT

## 2022-09-26 PROCEDURE — 80053 COMPREHEN METABOLIC PANEL: CPT | Performed by: INTERNAL MEDICINE

## 2022-09-26 PROCEDURE — 85027 COMPLETE CBC AUTOMATED: CPT | Performed by: INTERNAL MEDICINE

## 2022-09-26 PROCEDURE — 97530 THERAPEUTIC ACTIVITIES: CPT

## 2022-09-26 RX ORDER — ACETAMINOPHEN 325 MG/1
650 TABLET ORAL EVERY 8 HOURS PRN
Status: DISCONTINUED | OUTPATIENT
Start: 2022-09-26 | End: 2022-09-27 | Stop reason: HOSPADM

## 2022-09-26 RX ADMIN — MICONAZOLE NITRATE: 20 CREAM TOPICAL at 08:05

## 2022-09-26 RX ADMIN — HEPARIN SODIUM 5000 UNITS: 5000 INJECTION INTRAVENOUS; SUBCUTANEOUS at 13:48

## 2022-09-26 RX ADMIN — HEPARIN SODIUM 5000 UNITS: 5000 INJECTION INTRAVENOUS; SUBCUTANEOUS at 05:02

## 2022-09-26 RX ADMIN — MICONAZOLE NITRATE: 20 CREAM TOPICAL at 18:06

## 2022-09-26 RX ADMIN — Medication 125 MG: at 06:34

## 2022-09-26 RX ADMIN — PANTOPRAZOLE SODIUM 40 MG: 40 TABLET, DELAYED RELEASE ORAL at 05:02

## 2022-09-26 RX ADMIN — LEVOTHYROXINE SODIUM 50 MCG: 50 TABLET ORAL at 05:02

## 2022-09-26 RX ADMIN — SODIUM CHLORIDE, SODIUM LACTATE, POTASSIUM CHLORIDE, AND CALCIUM CHLORIDE 50 ML/HR: .6; .31; .03; .02 INJECTION, SOLUTION INTRAVENOUS at 04:57

## 2022-09-26 RX ADMIN — HEPARIN SODIUM 5000 UNITS: 5000 INJECTION INTRAVENOUS; SUBCUTANEOUS at 21:54

## 2022-09-26 RX ADMIN — ACETAMINOPHEN 975 MG: 325 TABLET, FILM COATED ORAL at 13:48

## 2022-09-26 RX ADMIN — FERROUS SULFATE TAB 325 MG (65 MG ELEMENTAL FE) 325 MG: 325 (65 FE) TAB at 21:49

## 2022-09-26 RX ADMIN — Medication 125 MG: at 23:43

## 2022-09-26 RX ADMIN — GABAPENTIN 100 MG: 100 CAPSULE ORAL at 21:49

## 2022-09-26 RX ADMIN — Medication 125 MG: at 11:43

## 2022-09-26 RX ADMIN — Medication 125 MG: at 18:06

## 2022-09-26 RX ADMIN — MIRTAZAPINE 7.5 MG: 15 TABLET, FILM COATED ORAL at 21:49

## 2022-09-26 RX ADMIN — ACETAMINOPHEN 975 MG: 325 TABLET, FILM COATED ORAL at 05:02

## 2022-09-26 NOTE — PLAN OF CARE
Problem: Nutrition/Hydration-ADULT  Goal: Nutrient/Hydration intake appropriate for improving, restoring or maintaining nutritional needs  Description: Monitor and assess patient's nutrition/hydration status for malnutrition  Collaborate with interdisciplinary team and initiate plan and interventions as ordered  Monitor patient's weight and dietary intake as ordered or per policy  Utilize nutrition screening tool and intervene as necessary  Determine patient's food preferences and provide high-protein, high-caloric foods as appropriate       INTERVENTIONS:  - Monitor oral intake, urinary output, labs, and treatment plans  - Assess nutrition and hydration status and recommend course of action  - Evaluate amount of meals eaten  - Assist patient with eating if necessary   - Allow adequate time for meals  - Recommend/ encourage appropriate diets, oral nutritional supplements, and vitamin/mineral supplements  - Order, calculate, and assess calorie counts as needed  - Assess need for intravenous fluids  - Provide nutrition/hydration education as appropriate  - Include patient/family/caregiver in decisions related to nutrition  Outcome: Not Progressing

## 2022-09-26 NOTE — PROGRESS NOTES
[facility]  Progress Note - Haven Alleg[place] 1933, 80 y o  female MRN: [id number]  Unit/Bed#: S -01 Encounter: [id number]  Primary Care Provider: Carlos Alberto Connor MD   Date and time admitted to hospital: 9/20/2022  1:21 PM    C  difficile colitis  Assessment & Plan  Continue oral vancomycin- day 6  Patient clinically improving    Dysphagia  Assessment & Plan  Patient was noted to have difficulty swallowing and painful swallowing  Unable to be completely assessed by speech and Swallow   Complained of belching, chest discomfort with swallowing  No prior EGD  No abnormal mass noted on CT scan  Barium esophagogram pending    Elevated serum alkaline phosphatase level  Assessment & Plan  · Alk phos 482 on admission; no recent LFTs work for comparison  GGT elevated  Ordered right upper quadrant ultrasound    Dementia ([facility] [address] )  Assessment & Plan  · Supportive care  Severe protein-calorie malnutrition ([facility] [address] )  Assessment & Plan  Malnutrition Findings:   Adult Malnutrition type: Acute illness  Adult Degree of Malnutrition: Other severe protein calorie malnutrition  Malnutrition Characteristics: Muscle loss, Fat loss, Weight loss       nutrition supplementation            360 Statement: related to inadequate energy intake as evidenced by 10% weight loss in September, severely depressed temples, dark sunken orbital, hollow supraclavicular space, wasted gastrocnemius, wasted interosseous  Int: Diet to advance as medically indicated, medical food supplements with meals  BMI Findings:  Adult BMI Classifications: Underweight < 18 5        Body mass index is 16 02 kg/m²  VTE Pharmacologic Prophylaxis:   VTE Score: 5 High Risk (Score >/= 5) - Pharmacological DVT Prophylaxis Ordered: Heparin  Sequential Compression Devices Ordered  Mechanical VTE Prophylaxis in Place: Yes    Patient Centered Rounds: I have performed bedside rounds with nursing staff today      Discussions with Specialists or Other Care Team Provider:  Case management    Education and Discussions with Family / Patient: Will update son    Current Length of Stay: 6 day(s)    Current Patient Status: Inpatient     Discharge Plan / Estimated Discharge Date: TBD    Code Status: Level 3 - DNAR and DNI      Subjective:   Denies complaints this morning  Pleasantly confused  Stated her diarrhea has slowed down  Objective:     Vitals:   Temp (24hrs), Av 1 °F (37 3 °C), Min:98 9 °F (37 2 °C), Max:99 2 °F (37 3 °C)    Temp:  [98 9 °F (37 2 °C)-99 2 °F (37 3 °C)] 99 2 °F (37 3 °C)  HR:  [84-87] 84  Resp:  [16-18] 18  BP: (121-143)/(53-85) 143/85  SpO2:  [94 %-95 %] 94 %  Body mass index is 16 02 kg/m²  Input and Output Summary (last 24 hours):     No intake or output data in the 24 hours ending 22 1423    Physical Exam:     Physical Exam  Vitals and nursing note reviewed  Constitutional:       General: She is not in acute distress  Appearance: She is well-developed  Comments: Appears thin, frail   HENT:      Head: Normocephalic and atraumatic  Eyes:      Conjunctiva/sclera: Conjunctivae normal    Cardiovascular:      Rate and Rhythm: Normal rate and regular rhythm  Heart sounds: Murmur heard  Pulmonary:      Effort: Pulmonary effort is normal  No respiratory distress  Breath sounds: Normal breath sounds  Abdominal:      Palpations: Abdomen is soft  Tenderness: There is abdominal tenderness (Generalized, mild)  Musculoskeletal:      Cervical back: Neck supple  Comments: Left upper extremity edema improving   Skin:     General: Skin is warm and dry  Capillary Refill: Capillary refill takes 2 to 3 seconds  Neurological:      Mental Status: She is alert  Mental status is at baseline           Additional Data:     Labs:  Results from last 7 days   Lab Units 22  0252 22  1542 22  1143 22  0605   WBC Thousand/uL 12 70* 10 67*   < > 32 46*   HEMOGLOBIN g/dL 13 3 14 3   < > 14 2   HEMATOCRIT % 40 9 43 8   < > 42 0   PLATELETS Thousands/uL 242 264   < > 235   BANDS PCT %  --   --   --  4   NEUTROS PCT %  --  80*  --   --    LYMPHS PCT %  --  7*  --   --    LYMPHO PCT %  --   --   --  2*   MONOS PCT %  --  9  --   --    MONO PCT %  --   --   --  7   EOS PCT %  --  1  --  0    < > = values in this interval not displayed  Results from last 7 days   Lab Units 09/26/22  0252   SODIUM mmol/L 141   POTASSIUM mmol/L 3 8   CHLORIDE mmol/L 117*   CO2 mmol/L 21   BUN mg/dL 17   CREATININE mg/dL 1 06   ANION GAP mmol/L 3*   CALCIUM mg/dL 8 0*   ALBUMIN g/dL 2 2*   TOTAL BILIRUBIN mg/dL 0 49   ALK PHOS U/L 457*   ALT U/L 21   AST U/L 25   GLUCOSE RANDOM mg/dL 106                 Results from last 7 days   Lab Units 09/23/22  1143 09/22/22  1329 09/21/22  0431 09/20/22  1819 09/20/22  1537   LACTIC ACID mmol/L  --  1 4  --  1 4  --    PROCALCITONIN ng/ml 3 08*  --  5 89*  --  6 47*       Imaging: No pertinent imaging reviewed  Recent Cultures (last 7 days):     Results from last 7 days   Lab Units 09/21/22  0321 09/20/22  1819   BLOOD CULTURE   --  No Growth After 5 Days  No Growth After 5 Days  C DIFF TOXIN B BY PCR  Positive*  --        Lines/Drains:  Invasive Devices  Report    Peripheral Intravenous Line  Duration           Peripheral IV 09/23/22 Right; Lower Forearm 3 days                Telemetry:        Last 24 Hours Medication List:   Current Facility-Administered Medications   Medication Dose Route Frequency Provider Last Rate    acetaminophen  975 mg Oral Q8H [facility] Rl Chadwick MD      aluminum-magnesium hydroxide-simethicone  30 mL Oral Q4H PRN Governor Roosevelt MD      ferrous sulfate  325 mg Oral HS Hernesto CHENTE Lozano      folic acid  314 mcg Oral Daily Hernesto CHENTE Lozano      gabapentin  100 mg Oral HS Hernesto CHENTE Lozano      heparin (porcine)  5,000 Units Subcutaneous [facility] Yamliex Alex PA-C      lactated ringers  50 mL/hr Intravenous Continuous Che Valenzuela MD 50 mL/hr (09/26/22 6662)    levothyroxine  50 mcg Oral Early Morning Verneda Hammans, PA-C      mirtazapine  7 5 mg Oral HS Verneda Hammans, PA-C      LEI ANTIFUNGAL   Topical BID Hernesto MD Eva      pantoprazole  40 mg Oral Early Morning Ania Tellez PA-C      rOPINIRole  1 mg Oral Daily Verneda Hammans, PA-C      vancomycin  125 mg Oral Q6H Rosa Mejia MD          Today, Patient Was Seen By: Che Valenzuela MD    ** Please Note: This note has been constructed using a voice recognition system   **

## 2022-09-26 NOTE — ASSESSMENT & PLAN NOTE
Patient was noted to have difficulty swallowing and painful swallowing    Unable to be completely assessed by speech and Swallow   Complained of belching, chest discomfort with swallowing  No prior EGD  No abnormal mass noted on CT scan  Barium esophagogram pending

## 2022-09-26 NOTE — PROGRESS NOTES
Progress Note - Marco Antonio Lehman 80 y o  female MRN: [id number]    Unit/Bed#: S -01 Encounter: [id number]    Assessment and Plan:     1  Dysphagia  2  Epigastric discomfort  GI consult yesterday for symptoms  This morning, patient reports her dysphagia and abdominal pain are resolved and that this was only a temporary problem  She had bedside swallow evaluation yesterday with coughing, vomiting and gagging during clears therefore evaluation discontinued  Barium swallow has been ordered but not performed yet  Unsure if any prior EGD  -patient reports symptoms are resolved however yesterday reports that were longstanding and had cough, belching and vomiting during speech evaluation yesterday   -will continue with further evaluation by obtaining barium swallow   -will continue to monitor symptoms with potential EGD if symptoms are persistent and based on barium swallow results  -patient was only cleared for clear liquid diet per speech    -will follow-up with results of barium swallow  If persistent concerns for oral intake, may need to discuss with patient and family further options moving forward    3  Elevated alkaline phosphatase  Isolated elevation currently 457  GGT was elevated  Platelets normal   No abdominal discomfort     -follow-up on right upper quadrant ultrasound for further evaluation   -continue to monitor daily  -if ultrasound is unremarkable, may need to consider MRI/MRCP however would recommend further discussion with patient and family regarding further investigation if this is within their wishes     ----------------------------------------------------------------------------------------------------------------    Subjective:     Patient reports she is doing well  When asked about her dysphagia symptoms she reports this is resolved  She reports this was only a temporary problem  She also denies any abdominal pain      Objective:     Vitals: Blood pressure 143/85, pulse 84, temperature 99 2 °F (37 3 °C), resp  rate 18, height 5' 3 75" (1 619 m), weight 42 kg (92 lb 9 5 oz), SpO2 94 %  ,Body mass index is 16 02 kg/m²  No intake or output data in the 24 hours ending 09/26/22 1107    Physical Exam:     General Appearance: Alert  Cooperative  Pleasant  Lungs: Clear to auscultation bilaterally, no rales or rhonchi, no labored breathing/accessory muscle use  Heart: Regular rate and rhythm, S1, S2 normal, no murmur, click, rub or gallop  Abdomen:+ patient initially jumps when palpating abdomen however reports this is because she has to urinate  On re-examination she denies any tenderness throughout  Abdomen is soft  Extremities: No cyanosis, clubbing, or edema    Invasive Devices  Report    Peripheral Intravenous Line  Duration           Peripheral IV 09/23/22 Right; Lower Forearm 2 days                Lab Results:  Results from last 7 days   Lab Units 09/26/22  0252 09/25/22  1542   WBC Thousand/uL 12 70* 10 67*   HEMOGLOBIN g/dL 13 3 14 3   HEMATOCRIT % 40 9 43 8   PLATELETS Thousands/uL 242 264   NEUTROS PCT %  --  80*   LYMPHS PCT %  --  7*   MONOS PCT %  --  9   EOS PCT %  --  1     Results from last 7 days   Lab Units 09/26/22  0252   POTASSIUM mmol/L 3 8   CHLORIDE mmol/L 117*   CO2 mmol/L 21   BUN mg/dL 17   CREATININE mg/dL 1 06   CALCIUM mg/dL 8 0*   ALK PHOS U/L 457*   ALT U/L 21   AST U/L 25     Invalid input(s): BILI      Results from last 7 days   Lab Units 09/20/22  1537   LIPASE u/L 32       Imaging Studies: I have personally reviewed pertinent imaging studies  CT abdomen pelvis wo contrast    Result Date: 9/20/2022  Impression: Severe colitis  Stable bilateral medullary nephrocalcinosis   Workstation performed: [id number]

## 2022-09-26 NOTE — PLAN OF CARE
Problem: OCCUPATIONAL THERAPY ADULT  Goal: Performs self-care activities at highest level of function for planned discharge setting  See evaluation for individualized goals  Description: Treatment Interventions: ADL retraining, Functional transfer training, Endurance training, Cognitive reorientation, Patient/family training, Equipment evaluation/education, Compensatory technique education, Continued evaluation, Energy conservation, Activityengagement          See flowsheet documentation for full assessment, interventions and recommendations  Note: Limitation: Decreased ADL status, Decreased Safe judgement during ADL, Decreased cognition, Decreased UE strength, Decreased endurance, Decreased self-care trans, Decreased high-level ADLs  Prognosis: Good  Assessment: Patient is a 80 y o  female seen for OT evaluation s/p admit to [address] [place] [address] on 9/20/2022 w/Sepsis [facility])  Comorbidities affecting patient's functional performance at time of assessment include: C  Diff, dysphagia, severe protein calorie malnutrition, CKD and dementia  Orders received for OT evaluation and treatment  Patient identified during session through name and wristband  PTA, patient was independent with functional mobility with RW, requiring assist for ADLS, requiring assist for IADLS and a resident of long term care  Personal factors affecting patient at time of initial evaluation include: limited caregiver support, limited insight into deficits, flat affect, decreased initiation and engagement, difficulty performing ADLs and difficulty performing IADLs  Patient is alert, oriented to name,, disoriented to time,, disoriented to place, and disoriented to situation, and presents with impaired judgement, inability to make safe decisions   The evaluation identifies the following performance deficits: weakness, impaired balance, decreased endurance, increased fall risk, new onset of impairment of functional mobility, decreased ADLS, decreased IADLS, pain, decreased activity tolerance, decreased safety awareness, impaired judgement, decreased cognition and decreased strength, that result in activity limitations  Based on the OT evaluation outcomes, functional performance deficits, and assessment findings, pt has been identified as high complexity, because the patient presents with comorbidites that affect occupational performance and required significant modification of tasks or assistance with consideration of multiple treatment options  The patient's raw score on the AM-PAC Daily Activity inpatient short form is 14, standardized score is 33 39, less than 39 4  Patient to benefit from continued Occupational Therapy treatment to address above deficits and maximize level of independence with ADLs and functional mobility  Occupational Performance areas to address include: bathing/ shower, dressing, toilet hygiene, transfer to all surfaces and functional ambulation  From OT standpoint, recommendation at time of d/c would be Return to facility with rehabilitation services       OT Discharge Recommendation: Post acute rehabilitation services     Leticia Borrego

## 2022-09-26 NOTE — ASSESSMENT & PLAN NOTE
· Alk phos 482 on admission; no recent LFTs work for comparison     GGT elevated  Ordered right upper quadrant ultrasound

## 2022-09-26 NOTE — OCCUPATIONAL THERAPY NOTE
Occupational Therapy Evaluation      Magdi Tang    9/26/2022    Patient Active Problem List   Diagnosis    Ambulatory dysfunction    Anemia    Back pain    Stage 4 chronic kidney disease (HCC)    Cognitive impairment    Constipation    Depression    Diarrhea    GERD (gastroesophageal reflux disease)    Hearing loss    Hypothyroidism    Mixed incontinence    Osteoporosis    Osteoarthritis    RLS (restless legs syndrome)    Hoarseness or changing voice    Impacted cerumen of left ear    Numbness of left hand    Dermatitis associated with moisture    Pressure injury of sacral region, stage 2 (HCC)    Carpal tunnel syndrome    Neck pain    MCI (mild cognitive impairment)    BMI less than 19,adult    Severe protein-calorie malnutrition (HCC)    Sepsis (HCC)    Acute kidney injury superimposed on chronic kidney disease (HCC)    UTI (urinary tract infection)    Heel pain, bilateral    Peripheral edema    History of laceration of skin    Blood in stool    Physical deconditioning    Physician orders for life-sustaining treatment (POLST) form indicates patient wish for do-not-resuscitate status    Dementia ([facility] [address] )    First degree atrioventricular block    Closed fracture of olecranon process of left ulna    Mild protein-calorie malnutrition (HCC)    CRF (chronic renal failure), stage 3 (moderate) (HCC)    Goals of care, counseling/discussion    Swollen finger    Fall    C  difficile colitis    Hyponatremia    Elevated serum alkaline phosphatase level    Hypokalemia    Dysphagia       Past Medical History:   Diagnosis Date    Closed displaced fracture of acromial end of left clavicle with routine healing     Disease of thyroid gland     Endometrial cancer (HCC)     Hip pain, left     History of rib fracture     MCI (mild cognitive impairment)     Osteoarthritis of hand     Urinary frequency     Urinary incontinence        Past Surgical History:   Procedure Laterality Date    HYSTERECTOMY          09/26/22 0936   OT Last Visit   OT Visit Date 09/26/22   Note Type   Note type Evaluation   Restrictions/Precautions   Weight Bearing Precautions Per Order No   Other Precautions Cognitive; Bed Alarm;Contact/isolation; Chair Alarm;Multiple lines; Fall Risk;Pain   Pain Assessment   Pain Assessment Tool FLACC   Pain Rating: FLACC (Rest) - Face 0   Pain Rating: FLACC (Rest) - Legs 0   Pain Rating: FLACC (Rest) - Activity 0   Pain Rating: FLACC (Rest) - Cry 0   Pain Rating: FLACC (Rest) - Consolability 0   Score: FLACC (Rest) 0   Pain Rating: FLACC (Activity) - Face 1   Pain Rating: FLACC (Activity) - Legs 1   Pain Rating: FLACC (Activity) - Activity 1   Pain Rating: FLACC (Activity) - Cry 1   Pain Rating: FLACC (Activity) - Consolability 1   Score: FLACC (Activity) 5   Home Living   Type of Home SNF  ([place] [address] [facility])   Home Layout One level;Performs ADLs on one level; Able to live on main level with bedroom/bathroom   Home Equipment Walker   Additional Comments Pt poor historian due to baseline cognitive deficits  Home setup obtained from chart review   Prior Function   Level of [place] Needs assistance with ADLs and functional mobility   Lives With Facility staff   Receives Help From Family;Personal care attendant   ADL Assistance Needs assistance   IADLs Needs assistance   Falls in the last 6 months 1 to 4  (at least 1 documented)   Comments Per chart review, pt requires A for ADL completion and uses a RW for mobility baseline  Lifestyle   Autonomy Pt resides at [facility] and has A for ADL completion   Reciprocal Relationships Supportive facility staff and daughter   [address] Enjoys sleeping in   Subjective   Subjective "It just hurts"   ADL   Eating Assistance 5  Supervision/Setup   Eating Deficit Setup; Beverage management   Toileting Assistance  2  Maximal Assistance   Toileting Deficit Steadying;Setup;Verbal cueing;Supervison/safety; Increased time to complete;Perineal hygiene Additional Comments Pt found to be grossly incontinent of bowel upon arrival  Max A to sequence and perform betty care in stance EOB  Bed Mobility   Supine to Sit 2  Maximal assistance   Additional items Assist x 1;HOB elevated; Increased time required;Verbal cues;LE management; Bedrails   Sit to Supine Unable to assess   Additional Comments Pt OOB to recliner at end of session   Transfers   Sit to Stand 3  Moderate assistance   Additional items Assist x 1; Increased time required;Verbal cues   Stand to Sit 3  Moderate assistance   Additional items Assist x 1; Increased time required;Verbal cues   Additional Comments x 3 sit<>stand transfers performed this session with mod A x 1  Functional Mobility   Functional Mobility 3  Moderate assistance   Additional Comments Few steps EOB to recliner with mod A x 1 and RW  + time to advance BLE's and unsteady on feet  Easily fatigued with short distances  Additional items Rolling walker   Balance   Static Sitting Fair -   Dynamic Sitting Poor +   Static Standing Poor   Dynamic Standing Poor   Activity Tolerance   Activity Tolerance Patient limited by fatigue;Patient limited by pain  (limited by cognition)   Nurse Made Aware Spoke to NICOLE Velez   RUE Assessment   RUE Assessment WFL  (able to bring beverage to mouth; limited AROM)   LUE Assessment   LUE Assessment WFL  (able to bring beverage to mouth; limited AROM)   Hand Function   Gross Motor Coordination Functional   Fine Motor Coordination Impaired  (impaired L hand)   Sensation   Light Touch No apparent deficits   Vision-Basic Assessment   Current Vision Wears glasses all the time   Cognition   Overall Cognitive Status Impaired   Arousal/Participation Alert; Cooperative;Arousable   Attention Attends with cues to redirect   Orientation Level Oriented to person;Disoriented to place; Disoriented to time;Disoriented to situation   Memory Decreased recall of precautions;Decreased recall of recent events;Decreased short term memory   Following Commands Follows one step commands with increased time or repetition   Comments Pt pleasantly confused this session  Redirection to task and VC for sequencing of basic ADL  Able to self report pain in buttock, VC for problem solving  Assessment   Limitation Decreased ADL status; Decreased Safe judgement during ADL;Decreased cognition;Decreased UE strength;Decreased endurance;Decreased self-care trans;Decreased high-level ADLs   Prognosis Good   Assessment Patient is a 80 y o  female seen for OT evaluation s/p admit to [facility] on 9/20/2022 w/Sepsis [facility])  Comorbidities affecting patient's functional performance at time of assessment include: C  Diff, dysphagia, severe protein calorie malnutrition, CKD and dementia  Orders received for OT evaluation and treatment  Patient identified during session through name and wristband  PTA, patient was independent with functional mobility with RW, requiring assist for ADLS, requiring assist for IADLS and a resident of long term care  Personal factors affecting patient at time of initial evaluation include: limited caregiver support, limited insight into deficits, flat affect, decreased initiation and engagement, difficulty performing ADLs and difficulty performing IADLs  Patient is alert, oriented to name,, disoriented to time,, disoriented to place, and disoriented to situation, and presents with impaired judgement, inability to make safe decisions  The evaluation identifies the following performance deficits: weakness, impaired balance, decreased endurance, increased fall risk, new onset of impairment of functional mobility, decreased ADLS, decreased IADLS, pain, decreased activity tolerance, decreased safety awareness, impaired judgement, decreased cognition and decreased strength, that result in activity limitations   Based on the OT evaluation outcomes, functional performance deficits, and assessment findings, pt has been identified as high complexity, because the patient presents with comorbidites that affect occupational performance and required significant modification of tasks or assistance with consideration of multiple treatment options  The patient's raw score on the AM-PAC Daily Activity inpatient short form is 14, standardized score is 33 39, less than 39 4  Patient to benefit from continued Occupational Therapy treatment to address above deficits and maximize level of independence with ADLs and functional mobility  Occupational Performance areas to address include: bathing/ shower, dressing, toilet hygiene, transfer to all surfaces and functional ambulation  From OT standpoint, recommendation at time of d/c would be Return to facility with rehabilitation services  Goals   Patient Goals Less pain in buttocks   LTG Time Frame 10-14   Long Term Goal #1 see goals below   Plan   Treatment Interventions ADL retraining;Functional transfer training; Endurance training;Cognitive reorientation;Patient/family training;Equipment evaluation/education; Compensatory technique education;Continued evaluation; Energy conservation; Activityengagement   Goal Expiration Date 10/06/22   OT Treatment Day 0   OT Frequency 3-5x/wk   Recommendation   OT Discharge Recommendation Post acute rehabilitation services   Additional Comments  At end of session, pt OOB to recliner with all needs met and call bell within reach     -[facility] Daily Activity Inpatient   Lower Body Dressing 2   Bathing 2   Toileting 2   Upper Body Dressing 2   Grooming 3   Eating 3   Daily Activity Raw Score 14   Daily Activity Standardized Score (Calc for Raw Score >=11) 33 39   AM-[facility] Applied Cognition Inpatient   Following a Speech/Presentation 1   Understanding Ordinary Conversation 3   Taking Medications 1   Remembering Where Things Are Placed or Put Away 1   Remembering List of 4-5 Errands 1   Taking Care of Complicated Tasks 1   Applied Cognition Raw Score 8   Applied Cognition Standardized Score 19 32     GOALS:    *Goals established to promote patient goal of to have less pain in buttocks:      *ADL transfers with Min (A) for inc'd independence with ADLs/purposeful tasks    *UB ADL with (S) for inc'd independence with self cares    *LB ADL with Mod (A) using AE prn for inc'd independence with self cares    *Toileting with Mod (A) for clothing management and hygiene for return to PLOF with personal care    *Increase stand tolerance x 3  m for inc'd tolerance with standing purposeful tasks    *Participate in 10m UE therex to increase overall stamina/activity tolerance for purposeful tasks    *Bed mobility- Mod (A) for inc'd independence to manage own comfort and initiate EOB & OOB purposeful tasks    *Patient will demonstrate 3 safety awareness/ principles to prevent falls in the home setting  *Patient will increase OOB/sitting tolerance to 2-4 hours per day to increase participation in self-care and leisure tasks with no s/s of exertion  *Patient will engage in ongoing cognitive assessment to assist with safe discharge planning/recommendations  *Patient will increase functional mobility to and from bathroom with rolling walker with min assist to increase performance with ADLS and to use a toilet  *Patient will improve functional activity tolerance to 10 minutes of sustained functional tasks to increase participation in basic self-care and decrease assistance level       KENZIE Moody/L

## 2022-09-27 ENCOUNTER — APPOINTMENT (OUTPATIENT)
Dept: RADIOLOGY | Facility: HOSPITAL | Age: 87
DRG: 871 | End: 2022-09-27
Payer: MEDICARE

## 2022-09-27 VITALS
BODY MASS INDEX: 15.81 KG/M2 | HEIGHT: 64 IN | TEMPERATURE: 98.1 F | DIASTOLIC BLOOD PRESSURE: 65 MMHG | OXYGEN SATURATION: 94 % | HEART RATE: 70 BPM | RESPIRATION RATE: 18 BRPM | SYSTOLIC BLOOD PRESSURE: 152 MMHG | WEIGHT: 92.59 LBS

## 2022-09-27 LAB
ALBUMIN SERPL BCP-MCNC: 2.4 G/DL (ref 3.5–5)
ALP SERPL-CCNC: 461 U/L (ref 34–104)
ALT SERPL W P-5'-P-CCNC: 20 U/L (ref 7–52)
ANION GAP SERPL CALCULATED.3IONS-SCNC: 7 MMOL/L (ref 4–13)
AST SERPL W P-5'-P-CCNC: 25 U/L (ref 13–39)
BILIRUB SERPL-MCNC: 0.56 MG/DL (ref 0.2–1)
BUN SERPL-MCNC: 14 MG/DL (ref 5–25)
CALCIUM ALBUM COR SERPL-MCNC: 9.6 MG/DL (ref 8.3–10.1)
CALCIUM SERPL-MCNC: 8.3 MG/DL (ref 8.4–10.2)
CHLORIDE SERPL-SCNC: 117 MMOL/L (ref 96–108)
CO2 SERPL-SCNC: 18 MMOL/L (ref 21–32)
CREAT SERPL-MCNC: 1 MG/DL (ref 0.6–1.3)
ERYTHROCYTE [DISTWIDTH] IN BLOOD BY AUTOMATED COUNT: 13.2 % (ref 11.6–15.1)
GFR SERPL CREATININE-BSD FRML MDRD: 50 ML/MIN/1.73SQ M
GLUCOSE SERPL-MCNC: 83 MG/DL (ref 65–140)
HCT VFR BLD AUTO: 43.1 % (ref 34.8–46.1)
HGB BLD-MCNC: 13.8 G/DL (ref 11.5–15.4)
MCH RBC QN AUTO: 31.9 PG (ref 26.8–34.3)
MCHC RBC AUTO-ENTMCNC: 32 G/DL (ref 31.4–37.4)
MCV RBC AUTO: 100 FL (ref 82–98)
PLATELET # BLD AUTO: 248 THOUSANDS/UL (ref 149–390)
PMV BLD AUTO: 9.5 FL (ref 8.9–12.7)
POTASSIUM SERPL-SCNC: 3.3 MMOL/L (ref 3.5–5.3)
PROT SERPL-MCNC: 4.7 G/DL (ref 6.4–8.4)
RBC # BLD AUTO: 4.32 MILLION/UL (ref 3.81–5.12)
SODIUM SERPL-SCNC: 142 MMOL/L (ref 135–147)
WBC # BLD AUTO: 13.15 THOUSAND/UL (ref 4.31–10.16)

## 2022-09-27 PROCEDURE — 80053 COMPREHEN METABOLIC PANEL: CPT | Performed by: INTERNAL MEDICINE

## 2022-09-27 PROCEDURE — 85027 COMPLETE CBC AUTOMATED: CPT | Performed by: INTERNAL MEDICINE

## 2022-09-27 PROCEDURE — 74230 X-RAY XM SWLNG FUNCJ C+: CPT

## 2022-09-27 PROCEDURE — 99232 SBSQ HOSP IP/OBS MODERATE 35: CPT | Performed by: PHYSICIAN ASSISTANT

## 2022-09-27 PROCEDURE — 74220 X-RAY XM ESOPHAGUS 1CNTRST: CPT

## 2022-09-27 PROCEDURE — 99238 HOSP IP/OBS DSCHRG MGMT 30/<: CPT | Performed by: HOSPITALIST

## 2022-09-27 PROCEDURE — 92611 MOTION FLUOROSCOPY/SWALLOW: CPT

## 2022-09-27 RX ORDER — PANTOPRAZOLE SODIUM 40 MG/1
40 TABLET, DELAYED RELEASE ORAL
Refills: 0
Start: 2022-09-28

## 2022-09-27 RX ORDER — MAGNESIUM HYDROXIDE/ALUMINUM HYDROXICE/SIMETHICONE 120; 1200; 1200 MG/30ML; MG/30ML; MG/30ML
30 SUSPENSION ORAL EVERY 4 HOURS PRN
Refills: 0
Start: 2022-09-27

## 2022-09-27 RX ADMIN — Medication 125 MG: at 12:00

## 2022-09-27 RX ADMIN — HEPARIN SODIUM 5000 UNITS: 5000 INJECTION INTRAVENOUS; SUBCUTANEOUS at 06:06

## 2022-09-27 RX ADMIN — Medication 125 MG: at 06:06

## 2022-09-27 RX ADMIN — MICONAZOLE NITRATE 1 APPLICATION: 20 CREAM TOPICAL at 08:25

## 2022-09-27 NOTE — PLAN OF CARE
Problem: PHYSICAL THERAPY ADULT  Goal: Performs mobility at highest level of function for planned discharge setting  See evaluation for individualized goals  Description: Treatment/Interventions: Functional transfer training, LE strengthening/ROM, Endurance training, Therapeutic exercise, Cognitive reorientation, Patient/family training, Equipment eval/education, Gait training, Bed mobility, Spoke to nursing, Spoke to case management  Equipment Recommended:  (Trial short rolling walker verses mauricio height lightweight wheelchair)       See flowsheet documentation for full assessment, interventions and recommendations  Outcome: Progressing  Note: Prognosis: Guarded  Problem List: Decreased strength, Decreased endurance, Impaired balance, Decreased mobility, Decreased cognition, Impaired judgement, Decreased safety awareness, Pain (hypersensitivity)  Assessment: Debbie Roper did participate in more functional mobility compared to initial eval and did follow more one step commands, but still requires substantial A for bed mobility and transfers  Skilled PT recommended to progress pt toward treatment goals   Goals updated to reflect pt progress        PT Discharge Recommendation: Post acute rehabilitation services    See flowsheet documentation for full assessment

## 2022-09-27 NOTE — PROGRESS NOTES
Progress Note - Gisela Mueller 80 y o  female MRN: [id number]    Unit/Bed#: S -01 Encounter: [id number]    Assessment and Plan:   Principal Problem:    Sepsis ([facility] )  Active Problems:    Stage 4 chronic kidney disease ([facility] )    Severe protein-calorie malnutrition ([facility] )    Dementia (HCC)    C  difficile colitis    Hyponatremia    Elevated serum alkaline phosphatase level    Hypokalemia    Dysphagia    #1  Dysphagia with epigastric pain: pain resolved today, had barium swallow showing some esophageal dysmotility, had some aspiration with thin and nectar thick liquids on speech evaluation  -diet per speech  -recommend sitting upright, chewing well, alternate liquids and solids  -no indication for EGD at this time    #2  Elevated alkaline phosphatase: Unclear etiology  Right upper quadrant ultrasound was notable for possible cirrhosis however patient has no other signs of this such elevated INR, low platelets, or low sodium   -can further evaluate this in the outpatient setting     ----------------------------------------------------------------------------------------------------------------    Subjective:     Patient is pleasantly confused, denies any abdominal pain    Objective:     Vitals: Blood pressure 152/65, pulse 70, temperature 98 1 °F (36 7 °C), resp  rate 18, height 5' 3 75" (1 619 m), weight 42 kg (92 lb 9 5 oz), SpO2 94 %  ,Body mass index is 16 02 kg/m²        Intake/Output Summary (Last 24 hours) at 9/27/2022 1510  Last data filed at 9/26/2022 1715  Gross per 24 hour   Intake 0 ml   Output --   Net 0 ml       Physical Exam:     General Appearance: Alert, appears stated age and cooperative; confused, thin, frail  Lungs: Clear to auscultation bilaterally, no rales or rhonchi, no labored breathing/accessory muscle use  Heart: Regular rate and rhythm, S1, S2 normal, murmur present, no click, rub or gallop  Abdomen: Soft, non-tender, non-distended; bowel sounds normal; no masses or no organomegaly  Extremities: No cyanosis, clubbing, or edema    Invasive Devices  Report    Peripheral Intravenous Line  Duration           Peripheral IV 09/23/22 Right; Lower Forearm 4 days                Lab Results:  Results from last 7 days   Lab Units 09/27/22  0833 09/26/22  0252 09/25/22  1542   WBC Thousand/uL 13 15*   < > 10 67*   HEMOGLOBIN g/dL 13 8   < > 14 3   HEMATOCRIT % 43 1   < > 43 8   PLATELETS Thousands/uL 248   < > 264   NEUTROS PCT %  --   --  80*   LYMPHS PCT %  --   --  7*   MONOS PCT %  --   --  9   EOS PCT %  --   --  1    < > = values in this interval not displayed  Results from last 7 days   Lab Units 09/27/22  0833   POTASSIUM mmol/L 3 3*   CHLORIDE mmol/L 117*   CO2 mmol/L 18*   BUN mg/dL 14   CREATININE mg/dL 1 00   CALCIUM mg/dL 8 3*   ALK PHOS U/L 461*   ALT U/L 20   AST U/L 25     Invalid input(s): BILI      Results from last 7 days   Lab Units 09/20/22  1537   LIPASE u/L 32       Imaging Studies: I have personally reviewed pertinent imaging studies  CT abdomen pelvis wo contrast    Result Date: 9/20/2022  Impression: Severe colitis  Stable bilateral medullary nephrocalcinosis   Workstation performed: [id number]

## 2022-09-27 NOTE — CASE MANAGEMENT
Case Management Discharge Planning Note    Patient name Marco Antonio Lehman  Location S /S -66 MRN [id number]  : 1933 Date 2022       Current Admission Date: 2022  Current Admission Diagnosis:Sepsis [facility])   Patient Active Problem List    Diagnosis Date Noted    Dysphagia 2022    Hypokalemia 2022    C  difficile colitis 2022    Hyponatremia 2022    Elevated serum alkaline phosphatase level 2022    Fall 09/10/2022    Swollen finger 2020    CRF (chronic renal failure), stage 3 (moderate) ([address] ) 10/09/2020    Goals of care, counseling/discussion 10/09/2020    Mild protein-calorie malnutrition ([address] ) 10/07/2020    Physician orders for life-sustaining treatment (POLST) form indicates patient wish for do-not-resuscitate status 10/05/2020    Dementia ([address] ) 10/05/2020    Closed fracture of olecranon process of left ulna 10/05/2020    Blood in stool 2020    History of laceration of skin 2020    Peripheral edema 05/15/2020    Heel pain, bilateral 2019    UTI (urinary tract infection) 2019    Sepsis ([address] ) 2019    Acute kidney injury superimposed on chronic kidney disease ([address] ) 2019    MCI (mild cognitive impairment) 2019    BMI less than 19,adult 2019    Neck pain 2019    Pressure injury of sacral region, stage 2 ([address] ) 2019    Carpal tunnel syndrome 2019    Dermatitis associated with moisture 2018    Numbness of left hand 2018    Hoarseness or changing voice 10/29/2018    Impacted cerumen of left ear 10/29/2018    Physical deconditioning 2017    Anemia 2017    Ambulatory dysfunction 2017    Diarrhea 2016    Mixed incontinence 10/07/2015    Severe protein-calorie malnutrition ([address] ) 10/07/2015    Back pain 2015    GERD (gastroesophageal reflux disease) 2015    Stage 4 chronic kidney disease ([address] ) 2014    Cognitive impairment 12/03/2014    Hearing loss 12/03/2014    Constipation 11/26/2014    Depression 11/26/2014    Hypothyroidism 11/26/2014    Osteoporosis 11/26/2014    Osteoarthritis 11/26/2014    RLS (restless legs syndrome) 11/26/2014    First degree atrioventricular block 08/05/2013      LOS (days): 7  Geometric Mean LOS (GMLOS) (days): 4 80  Days to GMLOS:-2     OBJECTIVE:  Risk of Unplanned Readmission Score: 16 73         Current admission status: Inpatient   Preferred Pharmacy:   [address] [place] [address]  Phone: [phone] Fax: [phone]    Primary Care Provider: Joann Hernandez MD    Primary Insurance: MEDICARE  [facility]    DISCHARGE DETAILS:    Discharge planning discussed with[de-identified] Jacklyn Thomas     Comments - [place] of Choice: CM notified all the above mentioned individuals of the Pt's d/c to [facility] today  Roundtrip referral made for a bls transport           Contacts  Patient Contacts: Morales Francisco  Relationship to Patient[de-identified] Family  Contact Method: Phone  Phone Number: [phone]  Reason/Outcome: Discharge Planning            Transport at Discharge : [facility]  Dispatcher Contacted: Yes  Number/Name of Dispatcher: [address] [phone]  Transported by [employer] and Unit #): [facility]  ETA of Transport (Date): 09/27/22  ETA of Transport (Time): [id number]

## 2022-09-27 NOTE — ASSESSMENT & PLAN NOTE
Patient was noted to have difficulty swallowing and painful swallowing    Unable to be completely assessed by speech and Swallow   Complained of belching, chest discomfort with swallowing  No prior EGD  No abnormal mass noted on CT scan  Modified diet as per speech

## 2022-09-27 NOTE — DISCHARGE SUMMARY
[facility]  Progress Note - Joanne Roman 1933, 80 y o  female MRN: [id number]  Unit/Bed#: S -01 Encounter: [id number]  Primary Care Provider: Willis Christiansen MD   Date and time admitted to hospital: 9/20/2022  1:21 PM    C  difficile colitis  Assessment & Plan  Continue oral vancomycin to finish 10 day course  Patient clinically improving    Dysphagia  Assessment & Plan  Patient was noted to have difficulty swallowing and painful swallowing  Unable to be completely assessed by speech and Swallow   Complained of belching, chest discomfort with swallowing  No prior EGD  No abnormal mass noted on CT scan  Modified diet as per speech    Elevated serum alkaline phosphatase level  Assessment & Plan  · Alk phos 482 on admission; no recent LFTs work for comparison  GGT elevated  Follow-up with GI as outpatient    Dementia [facility])  Assessment & Plan  · Supportive care  Severe protein-calorie malnutrition ([address] )  Assessment & Plan  Malnutrition Findings:   Adult Malnutrition type: Acute illness  Adult Degree of Malnutrition: Other severe protein calorie malnutrition  Malnutrition Characteristics: Muscle loss, Fat loss, Weight loss       nutrition supplementation            360 Statement: related to inadequate energy intake as evidenced by 10% weight loss in September, severely depressed temples, dark sunken orbital, hollow supraclavicular space, wasted gastrocnemius, wasted interosseous  Int: Diet to advance as medically indicated, medical food supplements with meals  BMI Findings:  Adult BMI Classifications: Underweight < 18 5        Body mass index is 16 02 kg/m²           Discharging Resident: Mainor Almonte MD  Attending: No att  providers found  PCP: Willis Christiansen MD  Admission Date: 9/20/2022  Discharge Date: 09/27/22    Disposition:    [facility]    For Discharges to [facility] SNF:   · [facility] -     Reason for Admission: Diarrhea    Consultations During Hospital Stay:  · GI    Procedures Performed:     · Barium swallow    Medication Adjustments and Discharge Medications:  · Summary of Medication Adjustments made as a result of this hospitalization:  As in AVS  · Medication Dosing Tapers - Please refer to Discharge Medication List for details on any medication dosing tapers (if applicable to patient)  · Medications being temporarily held (include recommended restart time):  None  · Discharge Medication List: See after visit summary for reconciled discharge medications  Wound Care Recommendations:  When applicable, please see wound care section of After Visit Summary  Diet Recommendations at Discharge:  Diet -   Fluid Restriction - No Fluid Restriction at Discharge  Instructions for any Catheters / Lines Present at Discharge (including removal date, if applicable):  None    Significant Findings / Test Results:     CT abdomen pelvis wo contrast    Result Date: 9/20/2022  Impression: Severe colitis  Stable bilateral medullary nephrocalcinosis  Workstation performed: [id number]   Barium swallow-routine esophagus- stasis of contrast in the esophagus with some decreased motility, GERD, small hiatal hernia    Barium swallow VD with speech    Incidental Findings:   · None       Hospital Course:     Opal Ornelas is a 80 y o  female patient with past medical history of dementia, CKD stage 4, hypothyroidism, who originally presented to the hospital on 9/20/2022 due to elevated white count and liver enzymes  Patient was complaining of generalized abdominal pain, diarrhea , recently treated with ceftriaxone for UTI with 3 day course  Patient was tested positive for C diff and received p o  Vancomycin  Patient was found to have elevated liver enzymes with alk-phos, right upper quadrant ultrasound was ordered  Patient was noted to have dysphagia at bedside  Speech was consulted  PD you barium swallow was done    Results in the chart   GI was consulted for dysphagia, results as above  Patient is being discharged with modified diet and requesting follow-up with GI  Will finish 10 day course of p o  Vancomycin    Condition at Discharge: stable     Discharge Day Visit / Exam:     Subjective:  Denies complaints  Vitals: Blood Pressure: 152/65 (09/27/22 0743)  Pulse: 70 (09/27/22 0743)  Temperature: 98 1 °F (36 7 °C) (09/27/22 0743)  Temp Source: Oral (09/26/22 1601)  Respirations: 18 (09/27/22 0743)  Height: 5' 3 75" (161 9 cm) (last ht assessment) (09/22/22 1523)  Weight - Scale: 42 kg (92 lb 9 5 oz) (09/20/22 2115)  SpO2: 94 % (09/27/22 0743)  Exam:   Physical Exam  Vitals and nursing note reviewed  Constitutional:       Comments: Appears thin, frail   HENT:      Head: Normocephalic and atraumatic  Nose: Nose normal    Eyes:      Extraocular Movements: Extraocular movements intact  Conjunctiva/sclera: Conjunctivae normal    Cardiovascular:      Rate and Rhythm: Normal rate  Pulses: Normal pulses  Heart sounds: Murmur heard  Pulmonary:      Effort: Pulmonary effort is normal       Breath sounds: Normal breath sounds  Abdominal:      General: Bowel sounds are normal       Palpations: Abdomen is soft  Tenderness: There is no abdominal tenderness  Musculoskeletal:      Cervical back: Normal range of motion and neck supple  Skin:     General: Skin is warm and dry  Capillary Refill: Capillary refill takes less than 2 seconds  Neurological:      Mental Status: She is alert  Mental status is at baseline  (  Discussion with Family:  Discussed with son on phone    Goals of Care Discussions:  · Code Status at Discharge: Prior  · Were there any Goals of Care Discussions during Hospitalization?: No  · Results of any General Goals of Care Discussions:  None   · POLST Completed: No   · If POLST Completed, Summary of POLST Agreement Provided Here:  None   · OK to Rehospitalize if Needed?  Yes    Discharge instructions/Information to patient and family:   See after visit summary section titled Discharge Instructions for information provided to patient and family        Planned Readmission:  None      ** Please Note: This note has been constructed using a voice recognition system **

## 2022-09-27 NOTE — QUICK NOTE
Received call from patient's Son Marielena Orourke  Discussed per chart documentation recent developments and current plan  Questions were answered to the best of my hability  Rayne Chino confirmed understanding

## 2022-09-27 NOTE — PLAN OF CARE
Problem: MOBILITY - ADULT  Goal: Maintain or return to baseline ADL function  Description: INTERVENTIONS:  -  Assess patient's ability to carry out ADLs; assess patient's baseline for ADL function and identify physical deficits which impact ability to perform ADLs (bathing, care of mouth/teeth, toileting, grooming, dressing, etc )  - Assess/evaluate cause of self-care deficits   - Assess range of motion  - Assess patient's mobility; develop plan if impaired  - Assess patient's need for assistive devices and provide as appropriate  - Encourage maximum independence but intervene and supervise when necessary  - Involve family in performance of ADLs  - Assess for home care needs following discharge   - Consider OT consult to assist with ADL evaluation and planning for discharge  - Provide patient education as appropriate  Outcome: Progressing  Goal: Maintains/Returns to pre admission functional level  Description: INTERVENTIONS:  - Perform BMAT or MOVE assessment daily    - Set and communicate daily mobility goal to care team and patient/family/caregiver     - Collaborate with rehabilitation services on mobility goals if consulted  - Out of bed for toileting  - Record patient progress and toleration of activity level   Outcome: Progressing     Problem: Potential for Falls  Goal: Patient will remain free of falls  Description: INTERVENTIONS:  - Educate patient/family on patient safety including physical limitations  - Instruct patient to call for assistance with activity   - Consult OT/PT to assist with strengthening/mobility   - Keep Call bell within reach  - Keep bed low and locked with side rails adjusted as appropriate  - Keep care items and personal belongings within reach  - Initiate and maintain comfort rounds  - Make Fall Risk Sign visible to staff  - Apply yellow socks and bracelet for high fall risk patients  - Consider moving patient to room near nurses station  Outcome: Progressing     Problem: Prexisting or High Potential for Compromised Skin Integrity  Goal: Skin integrity is maintained or improved  Description: INTERVENTIONS:  - Identify patients at risk for skin breakdown  - Assess and monitor skin integrity  - Assess and monitor nutrition and hydration status  - Monitor labs   - Assess for incontinence   - Turn and reposition patient  - Assist with mobility/ambulation  - Relieve pressure over bony prominences  - Avoid friction and shearing  - Provide appropriate hygiene as needed including keeping skin clean and dry  - Evaluate need for skin moisturizer/barrier cream  - Collaborate with interdisciplinary team   - Patient/family teaching  - Consider wound care consult   Outcome: Progressing     Problem: Nutrition/Hydration-ADULT  Goal: Nutrient/Hydration intake appropriate for improving, restoring or maintaining nutritional needs  Description: Monitor and assess patient's nutrition/hydration status for malnutrition  Collaborate with interdisciplinary team and initiate plan and interventions as ordered  Monitor patient's weight and dietary intake as ordered or per policy  Utilize nutrition screening tool and intervene as necessary  Determine patient's food preferences and provide high-protein, high-caloric foods as appropriate       INTERVENTIONS:  - Monitor oral intake, urinary output, labs, and treatment plans  - Assess nutrition and hydration status and recommend course of action  - Evaluate amount of meals eaten  - Assist patient with eating if necessary   - Allow adequate time for meals  - Recommend/ encourage appropriate diets, oral nutritional supplements, and vitamin/mineral supplements  - Order, calculate, and assess calorie counts as needed  - Assess need for intravenous fluids  - Provide nutrition/hydration education as appropriate  - Include patient/family/caregiver in decisions related to nutrition  Outcome: Progressing     Problem: PAIN - ADULT  Goal: Verbalizes/displays adequate comfort level or baseline comfort level  Description: Interventions:  - Encourage patient to monitor pain and request assistance  - Assess pain using appropriate pain scale  - Administer analgesics based on type and severity of pain and evaluate response  - Implement non-pharmacological measures as appropriate and evaluate response  - Consider cultural and social influences on pain and pain management  - Notify physician/advanced practitioner if interventions unsuccessful or patient reports new pain  Outcome: Progressing     Problem: INFECTION - ADULT  Goal: Absence or prevention of progression during hospitalization  Description: INTERVENTIONS:  - Assess and monitor for signs and symptoms of infection  - Monitor lab/diagnostic results  - Monitor all insertion sites, i e  indwelling lines, tubes, and drains  - Monitor endotracheal if appropriate and nasal secretions for changes in amount and color  - [place] appropriate cooling/warming therapies per order  - Administer medications as ordered  - Instruct and encourage patient and family to use good hand hygiene technique  - Identify and instruct in appropriate isolation precautions for identified infection/condition  Outcome: Progressing  Goal: Absence of fever/infection during neutropenic period  Description: INTERVENTIONS:  - Monitor WBC    Outcome: Progressing     Problem: GASTROINTESTINAL - ADULT  Goal: Minimal or absence of nausea and/or vomiting  Description: INTERVENTIONS:  - Administer IV fluids if ordered to ensure adequate hydration  - Maintain NPO status until nausea and vomiting are resolved  - Nasogastric tube if ordered  - Administer ordered antiemetic medications as needed  - Provide nonpharmacologic comfort measures as appropriate  - Advance diet as tolerated, if ordered  - Consider nutrition services referral to assist patient with adequate nutrition and appropriate food choices  Outcome: Progressing  Goal: Maintains or returns to baseline bowel function  Description: INTERVENTIONS:  - Assess bowel function  - Encourage oral fluids to ensure adequate hydration  - Administer IV fluids if ordered to ensure adequate hydration  - Administer ordered medications as needed  - Encourage mobilization and activity  - Consider nutritional services referral to assist patient with adequate nutrition and appropriate food choices  Outcome: Progressing  Goal: Maintains adequate nutritional intake  Description: INTERVENTIONS:  - Monitor percentage of each meal consumed  - Identify factors contributing to decreased intake, treat as appropriate  - Assist with meals as needed  - Monitor I&O, weight, and lab values if indicated  - Obtain nutrition services referral as needed  Outcome: Progressing  Goal: Oral mucous membranes remain intact  Description: INTERVENTIONS  - Assess oral mucosa and hygiene practices  - Implement preventative oral hygiene regimen  - Implement oral medicated treatments as ordered  - Initiate Nutrition services referral as needed  Outcome: Progressing     Problem: GENITOURINARY - ADULT  Goal: Maintains or returns to baseline urinary function  Description: INTERVENTIONS:  - Assess urinary function  - Encourage oral fluids to ensure adequate hydration if ordered  - Administer IV fluids as ordered to ensure adequate hydration  - Administer ordered medications as needed  - Offer frequent toileting  - Follow urinary retention protocol if ordered  Outcome: Progressing  Goal: Absence of urinary retention  Description: INTERVENTIONS:  - Assess patients ability to void and empty bladder  - Monitor I/O  - Bladder scan as needed  - Discuss with physician/AP medications to alleviate retention as needed  - Discuss catheterization for long term situations as appropriate  Outcome: Progressing

## 2022-09-27 NOTE — CASE MANAGEMENT
Case Management Discharge Planning Note    Patient name Matias ASHER /S -48 MRN [id number]  : 1933 Date 2022       Current Admission Date: 2022  Current Admission Diagnosis:Sepsis [facility])   Patient Active Problem List    Diagnosis Date Noted    Dysphagia 2022    Hypokalemia 2022    C  difficile colitis 2022    Hyponatremia 2022    Elevated serum alkaline phosphatase level 2022    Fall 09/10/2022    Swollen finger 2020    CRF (chronic renal failure), stage 3 (moderate) ([address] ) 10/09/2020    Goals of care, counseling/discussion 10/09/2020    Mild protein-calorie malnutrition ([address] ) 10/07/2020    Physician orders for life-sustaining treatment (POLST) form indicates patient wish for do-not-resuscitate status 10/05/2020    D[facility] [address] ) 10/05/2020    Closed fracture of olecranon process of left ulna 10/05/2020    Blood in stool 2020    History of laceration of skin 2020    Peripheral edema 05/15/2020    Heel pain, bilateral 2019    UTI (urinary tract infection) 2019    Sepsis ([address] ) 2019    Acute kidney injury superimposed on chronic kidney disease ([address] ) 2019    MCI (mild cognitive impairment) 2019    BMI less than 19,adult 2019    Neck pain 2019    Pressure injury of sacral region, stage 2 ([address] ) 2019    Carpal tunnel syndrome 2019    Dermatitis associated with moisture 2018    Numbness of left hand 2018    Hoarseness or changing voice 10/29/2018    Impacted cerumen of left ear 10/29/2018    Physical deconditioning 2017    Anemia 2017    Ambulatory dysfunction 2017    Diarrhea 2016    Mixed incontinence 10/07/2015    Severe protein-calorie malnutrition ([address] ) 10/07/2015    Back pain 2015    GERD (gastroesophageal reflux disease) 2015    Stage 4 chronic kidney disease ([address] ) 2014    Cognitive impairment 12/03/2014    Hearing loss 12/03/2014    Constipation 11/26/2014    Depression 11/26/2014    Hypothyroidism 11/26/2014    Osteoporosis 11/26/2014    Osteoarthritis 11/26/2014    RLS (restless legs syndrome) 11/26/2014    First degree atrioventricular block 08/05/2013      LOS (days): 7  Geometric Mean LOS (GMLOS) (days): 4 80  Days to GMLOS:-2     OBJECTIVE:  Risk of Unplanned Readmission Score: 16 73         Current admission status: Inpatient   Preferred Pharmacy:   [address] [place] [address]  Phone: [phone] Fax: [phone]    Primary Care Provider: aShil Cordero MD    Primary Insurance: MEDICARE  Secondary Insurance: [facility]    DISCHARGE DETAILS:    Discharge planning discussed with[de-identified] Shania Pacheco     Comments - [place] of Choice: CM notified all the above mentioned individuals of the Pt's d/c to [facility] today  Roundtrip referral made for a bls transport  Contacts  Patient Contacts: Rut Bui  Relationship to Patient[de-identified] Family  Contact Method: Phone  Phone Number: [phone]  Reason/Outcome: Discharge Planning       Transport at Discharge : [facility]  Dispatcher Contacted: Yes  Number/Name of Dispatcher: Ashvin Reilly [id number]  [facility]  ETA of Transport (Date): 09/27/22  ETA of Transport (Time): [id number]        IMM Given (Date):: 09/27/22  IMM Given to[de-identified] Family  Family notified[de-identified] CM reviewed Pt's Medicare Rights with Pt's daughter Rut Bui     IMM reviewed with Pt's daughter Rut Bui, who reported she agrees with discharge determination        Accepting Facility Name, [address] : [facility]  Receiving Facility/Agency Phone Number: [phone]  Facility/Agency Fax Number: [phone]

## 2022-09-27 NOTE — PHYSICAL THERAPY NOTE
PHYSICAL THERAPY TREATMENT NOTE  NAME:  Ashvin Valencia  DATE: 09/26/22    Length Of Stay: 6  Performed at least 2 patient identifiers during session: Name and Birthday    TREATMENT:    09/26/22 1529   PT Last Visit   PT Visit Date 09/26/22  (session limited to vascular testing)   Note Type   Note Type Treatment   Pain Assessment   Pain Assessment Tool FLACC   Pain Location/Orientation Location: Buttocks   Effect of Pain on Daily Activities limits sitting tolerance and posture   Patient's Stated Pain Goal No pain   Hospital Pain Intervention(s) Repositioned; Ambulation/increased activity; Emotional support; Environmental changes   Pain Rating: FLACC (Rest) - Face 1   Pain Rating: FLACC (Rest) - Legs 1   Pain Rating: FLACC (Rest) - Activity 1   Pain Rating: FLACC (Rest) - Cry 1   Pain Rating: FLACC (Rest) - Consolability 1   Score: FLACC (Rest) 5   Pain Rating: FLACC (Activity) - Face 1   Pain Rating: FLACC (Activity) - Legs 1   Pain Rating: FLACC (Activity) - Activity 1   Pain Rating: FLACC (Activity) - Cry 1   Pain Rating: FLACC (Activity) - Consolability 1   Score: FLACC (Activity) 5   Restrictions/Precautions   Other Precautions Cognitive; Chair Alarm; Bed Alarm;Contact/isolation;Multiple lines; Fall Risk;Pain;Hard of hearing   General   Chart Reviewed Yes   Response to Previous Treatment Patient unable to report, no changes reported from family or staff   Family/Caregiver Present No   Cognition   Overall Cognitive Status Impaired   Arousal/Participation Alert   Attention Attends with cues to redirect   Orientation Level Oriented to person   Subjective   Subjective " I just want to move, my butt is sore"  Pt pleasant and cooperative, reports fear/retreat with mobility especially fear of falling  Bed Mobility   Sit to Supine 2  Maximal assistance   Additional items Assist x 2;HOB elevated; Bedrails; Increased time required;Verbal cues   Transfers   Sit to Stand 2  Maximal assistance   Additional items Assist x 1;Armrests; Increased time required;Verbal cues  (instruction for hand placement)   Stand to Sit 2  Maximal assistance   Additional items Assist x 1; Increased time required;Verbal cues;Armrests   Stand pivot 1  Dependent   Additional items Assist x 1; Increased time required;Verbal cues  (using gait belt, pt in flexed hip/knee position)   Ambulation/Elevation   Gait pattern Not appropriate   Balance   Static Sitting Fair -   Static Standing Poor -   Endurance Deficit   Endurance Deficit Yes   Endurance Deficit Description needed therapeutic rests between mobility trials, limited unsupported sitting tolerance, limited standing tolerance   Activity Tolerance   Activity Tolerance Patient limited by pain; Patient limited by fatigue   Nurse Made Aware spoke to PCAs prior to session   Assessment   Prognosis Guarded   Problem List Decreased strength;Decreased endurance; Impaired balance;Decreased mobility; Decreased cognition; Impaired judgement;Decreased safety awareness;Pain  (hypersensitivity)   Assessment Shaniqua did participate in more functional mobility compared to initial eval and did follow more one step commands, but still requires substantial A for bed mobility and transfers  Skilled PT recommended to progress pt toward treatment goals   Goals updated to reflect pt progress   Goals   Patient Goals less pain   STG Expiration Date 10/03/22   Short Term Goal #1 (S)  Pt will: Perform bed mobility tasks with consistent Min assist to prepare for transfers and reposition in bed  Perform transfers with no more than min A to decreased burden of care and encourage indep from pt  Amb w/short rolling walker for 5' w/min  a of 1 and chair follow of second to prpomote pt to amb short distances w/ [facility] staff  Increase unsupported sitting tolerance to greater than 10 minutes with fair balance to improve ability to perform upright tasks at home    Increase ankle range of motion to [facility] to promote weight-bearing for transfers   PT Treatment Day 1   Plan   Treatment/Interventions Functional transfer training;LE strengthening/ROM; Therapeutic exercise; Endurance training;Cognitive reorientation;Patient/family training;Equipment eval/education; Bed mobility;Gait training;Spoke to nursing   Progress Slow progress, decreased activity tolerance   PT Frequency 1-2x/wk   Recommendation   PT Discharge Recommendation Post acute rehabilitation services   Equipment Recommended   (Trial short rolling walker verses mauricio height lightweight wheelchair)   [facility] Basic Mobility Inpatient   Turning in Bed Without Bedrails 2   Lying on Back to Sitting on Edge of Flat Bed 1   Moving Bed to Chair 1   Standing Up From Chair 1   Walk in Room 1   Climb 3-5 Stairs 1   Basic Mobility Inpatient Raw Score 7   Turning Head Towards Sound 3   Follow Simple Instructions 2   Low Function Basic Mobility Raw Score 12   Low Function Basic Mobility Standardized Score 18 33   Highest Level Of Mobility   -HLM Goal 2: Bed activities/Dependent transfer   JH-HLM Achieved 4: Move to chair/commode   Education   Education Provided Mobility training   Patient Reinforcement needed; Explanation/teachback used   End of Consult   Patient Position at End of Consult All needs within reach;Bed/Chair alarm activated;Supine   End of Consult Comments Pt w/ vascular tech     The patient's [facility] Basic Mobility Inpatient Short Form Raw Score is 7, Standardized Score is    A Raw Score of less than 16 suggests the patient may benefit from discharge to post-acute rehabilitation services, which DOES coincide with CURRENT above PT recommendations  However please refer to therapist recommendation for discharge planning given other factors that may influence destination  Adapted from [employer] [facility] 6-Clicks Basic Mobility and Daily Activity Scores With Discharge Destination  Physical Therapy, 2021;101:1-9   DOI: 10 [email or web address]    Phong Verdugo PT, DPT

## 2022-09-27 NOTE — PROCEDURES
Video Swallow Study      Patient Name: Steve Rowe  Today's Date: 9/27/2022        Past Medical History  Past Medical History:   Diagnosis Date    Closed displaced fracture of acromial end of left clavicle with routine healing     Disease of thyroid gland     Endometrial cancer ([address] )     Hip pain, left     History of rib fracture     MCI (mild cognitive impairment)     Osteoarthritis of hand     Urinary frequency     Urinary incontinence         Past Surgical History  Past Surgical History:   Procedure Laterality Date    HYSTERECTOMY           General Information:    81 yo female referred  for a VBS by BRITT Hernandez for GI  for dysphagia; assess for oropharyngeal dysphagia  See bedside swallow eval completed 9/25/22 for further background info  Barium swallow/esophagram completed prior to VBS  Cognition:  Alert, confused, constant talking during study, needed reminders to not talk while eating  Speech/Swallow Mech: Oral motor movements appeared  WFL, oral mucosa was dry; Dentition was  Partial upper, denture lower; Respiratory Status: WFL on RA;   Current diet: NPO  Prior VBS: none  Pt was seen in radiology for a Video Barium Swallow Study, seated in the upright position and viewed laterally with the following consistencies: puree, soft/solids, honey thick liquids, nectar thick liquids, thin liquids, barium pill in applesauce  Results are as follows:     **Images are available for review on PACS          Oral Stage: Mild-moderately impaired    pt c/o dry lips and mouth  Pt w/ adequate bolus retrieval via spoon, cup, and straw  Pt w/ prolonged mastication of soft/solids, decreased bolus formation and transfer- related to dry mouth  Oral residue noted  Pt appeared w/ good oral control of liquids by cup and straw  Pharyngeal Stage:  Moderately impaired   Swallow initiation was timely  with complete laryngeal excursion and epiglottic inversion  Decreased tongue base retraction noted w/ vallecular retention  Mild pharyngeal retention w/ foods  Small amount of silent aspiration noted x1 w/ large cup sip of NTL, also penetration of residue (need for secondary swallows) w/ passive silent aspiration  Pt also talking throughout study and not aware to do secondary swallows, needed verbal instruction to stop talking to do secondary swallows  Darci  Aspiration noted with thin liquids by straw, cough response noted  Esophageal Stage:   briefly assessed, see formal esophagram report for complete details  Delayed clearance noted w/ puree and barium pill  Assessment Summary:   Mild-moderate oral/ moderate pharyngeal dysphagia due to intermittent aspiration w/ NTL and thin liquids, also pharyngeal residue  Pt talking while eating, suspect decreased awareness and possible swallow fatigue  Recommendations:   Dysphagia 2 mech soft w/ honey thick liquids  Small, single cup sips  meds whole in puree   Aspiration precautions  Minimize distractions, pt not to be talking while eating  Will cont to follow         April Amber Turner MA CCC-SLP  Speech Pathologist  [place] license # [id number]  [place] license # [id number]  Available via [employer]

## 2022-09-27 NOTE — ASSESSMENT & PLAN NOTE
· Alk phos 482 on admission; no recent LFTs work for comparison     GGT elevated  Follow-up with GI as outpatient

## 2022-11-10 PROBLEM — N39.0 UTI (URINARY TRACT INFECTION): Status: RESOLVED | Noted: 2019-06-05 | Resolved: 2022-11-10

## 2022-11-21 PROBLEM — A41.9 SEPSIS (HCC): Status: RESOLVED | Noted: 2019-05-26 | Resolved: 2022-11-21

## 2024-01-01 ENCOUNTER — HOME CARE VISIT (OUTPATIENT)
Dept: HOME HOSPICE | Facility: HOSPICE | Age: 89
End: 2024-01-01
Payer: MEDICARE

## 2024-01-01 ENCOUNTER — HOSPICE ADMISSION (OUTPATIENT)
Dept: HOME HOSPICE | Facility: HOSPICE | Age: 89
End: 2024-01-01
Payer: MEDICARE

## 2024-01-01 ENCOUNTER — HOME CARE VISIT (OUTPATIENT)
Dept: HOME HEALTH SERVICES | Facility: HOME HEALTHCARE | Age: 89
End: 2024-01-01
Payer: MEDICARE

## 2024-01-01 ENCOUNTER — TELEPHONE (OUTPATIENT)
Dept: HOME HEALTH SERVICES | Facility: HOME HEALTHCARE | Age: 89
End: 2024-01-01

## 2024-01-01 PROCEDURE — G0156 HHCP-SVS OF AIDE,EA 15 MIN: HCPCS

## 2024-01-01 PROCEDURE — 10330064 PAD, ABD 5X9" STR LF (1/PK 20PK/BX) MGM1

## 2024-01-01 PROCEDURE — 10330087 HSPC SERVICE INTENSITY ADD-ON

## 2024-01-01 PROCEDURE — 10330064 DRESSING, N/ADHER STR 3X4 LF (100/BX) MG

## 2024-01-01 PROCEDURE — 10330064 SPONGE, GAUZE 12PLY STR 4"X4" (1/PK 50/B

## 2024-01-01 PROCEDURE — 10330064 BANDAGE, CNFRM 4"X4.1YDS N/S LF (12RL/BG

## 2024-01-01 PROCEDURE — 10330057 MEDICATION, GENERAL

## 2024-01-01 PROCEDURE — 10330064 BRIEF, WINGS CHOICE PLUS QUILTED MED (12

## 2024-01-01 PROCEDURE — G0299 HHS/HOSPICE OF RN EA 15 MIN: HCPCS

## 2024-01-01 PROCEDURE — 10330064 DRESSING, COSMO LF 4"X4" (25/BX 8BX/CS)

## 2024-01-01 PROCEDURE — 10330064 DRESSING, HYDROGEL 4X4 (15/BX 4BX/CS)

## 2024-02-21 PROBLEM — H61.22 IMPACTED CERUMEN OF LEFT EAR: Status: RESOLVED | Noted: 2018-10-29 | Resolved: 2024-02-21

## 2024-03-20 ENCOUNTER — HOME CARE VISIT (OUTPATIENT)
Dept: HOME HEALTH SERVICES | Facility: HOME HEALTHCARE | Age: 89
End: 2024-03-20
Payer: MEDICARE

## 2024-03-20 NOTE — Clinical Note
Hi Dr. Esparza, Seeking RLOC for Michela Rosario 89 yo  1933 resident @  SNF. PMHX: Dementia CKD3 endometrial ca ETOH abuse. Pt has been noted to be declining. Frail thin cachectic. Oriented only to self. Unable to recognize family members at times. Inappropriate speech. Noted to have dysphagia. Diet down graded to puree. No longer able to feed self. Requires complete care for all ADL's. Incont b and b. OOb to WC. Labs: bun 32 cr 1.54 gfr 32 alk phos 396 alb 2.8. Appears appropriate for RLOC.

## 2024-03-21 ENCOUNTER — HOME CARE VISIT (OUTPATIENT)
Dept: HOME HEALTH SERVICES | Facility: HOME HEALTHCARE | Age: 89
End: 2024-03-21
Payer: MEDICARE

## 2024-03-21 ENCOUNTER — HOME CARE VISIT (OUTPATIENT)
Dept: HOME HOSPICE | Facility: HOSPICE | Age: 89
End: 2024-03-21
Payer: MEDICARE

## 2024-03-21 VITALS
HEIGHT: 62 IN | SYSTOLIC BLOOD PRESSURE: 108 MMHG | HEART RATE: 72 BPM | BODY MASS INDEX: 16.94 KG/M2 | RESPIRATION RATE: 18 BRPM | DIASTOLIC BLOOD PRESSURE: 68 MMHG

## 2024-03-21 DIAGNOSIS — Z51.5 HOSPICE CARE PATIENT: Primary | ICD-10-CM

## 2024-03-21 DIAGNOSIS — F01.50 VASCULAR DEMENTIA, UNSPECIFIED DEMENTIA SEVERITY, UNSPECIFIED WHETHER BEHAVIORAL, PSYCHOTIC, OR MOOD DISTURBANCE OR ANXIETY (HCC): ICD-10-CM

## 2024-03-21 PROCEDURE — G0155 HHCP-SVS OF CSW,EA 15 MIN: HCPCS

## 2024-03-21 PROCEDURE — G0299 HHS/HOSPICE OF RN EA 15 MIN: HCPCS

## 2024-03-21 RX ORDER — OXYCODONE HYDROCHLORIDE 5 MG/1
2.5 TABLET ORAL EVERY 4 HOURS PRN
Qty: 15 TABLET | Refills: 0 | Status: SHIPPED | OUTPATIENT
Start: 2024-03-21

## 2024-03-21 RX ORDER — LORAZEPAM 2 MG/ML
0.5 CONCENTRATE ORAL EVERY 6 HOURS PRN
Qty: 30 ML | Refills: 0 | Status: SHIPPED | OUTPATIENT
Start: 2024-03-21

## 2024-03-21 NOTE — CASE COMMUNICATION
hha 1-3 x week for adl assistance and companionship starting week of 3/24 then 3-5 x week after.  pt at [facility]

## 2024-03-21 NOTE — PROGRESS NOTES
3/21/2024 12:51 PM  Formerly [facility] facility patient requests SOC medications.  Filled electronically via Epic as per [place] State Law.    Requested Prescriptions     Signed Prescriptions Disp Refills    LORazepam (ATIVAN) 2 mg/mL concentrated solution 30 mL 0     Sig: Take 0.25 mL (0.5 mg total) by mouth every 6 (six) hours as needed for anxiety or sleep (dyspnea)    oxyCODONE (ROXICODONE) 5 immediate release tablet 15 tablet 0     Sig: Take 0.5 tablets (2.5 mg total) by mouth every 4 (four) hours as needed (pain / dyspnea) Medication may be crushed and administered in applesauce, dissolve in a drop of water and give under the tongue or dilute in a few drops of water and administer via oral syringe Max Daily Amount: 15 mg       KRISTI Booth  [facility]  Hospice Answering Service: [phone]  You can find me on [employer]!

## 2024-03-21 NOTE — CASE COMMUNICATION
Ship to x Pt. Home   Branch: x [place]       Saline 100ml [id number] x2    Gauze 4x4 ST [id number] x10    Gauze Chato stretch roll 4inch n/s 12 rolls per unit  [id number] x1    ABD 5x9 [id number] x10    Telfa pad 3x4 9973 x10    Dermagran [id number] x10    Border Gauze [id number] x8

## 2024-03-22 ENCOUNTER — HOME CARE VISIT (OUTPATIENT)
Dept: HOME HEALTH SERVICES | Facility: HOME HEALTHCARE | Age: 89
End: 2024-03-22
Payer: MEDICARE

## 2024-03-22 ENCOUNTER — HOME CARE VISIT (OUTPATIENT)
Dept: HOME HOSPICE | Facility: HOSPICE | Age: 89
End: 2024-03-22
Payer: MEDICARE

## 2024-03-22 PROCEDURE — G0299 HHS/HOSPICE OF RN EA 15 MIN: HCPCS

## 2024-03-25 ENCOUNTER — HOME CARE VISIT (OUTPATIENT)
Dept: HOME HEALTH SERVICES | Facility: HOME HEALTHCARE | Age: 89
End: 2024-03-25
Payer: MEDICARE

## 2024-03-25 PROCEDURE — G0299 HHS/HOSPICE OF RN EA 15 MIN: HCPCS

## 2024-03-25 PROCEDURE — G0156 HHCP-SVS OF AIDE,EA 15 MIN: HCPCS

## 2024-03-27 ENCOUNTER — HOME CARE VISIT (OUTPATIENT)
Dept: HOME HEALTH SERVICES | Facility: HOME HEALTHCARE | Age: 89
End: 2024-03-27
Payer: MEDICARE

## 2024-03-27 PROCEDURE — G0156 HHCP-SVS OF AIDE,EA 15 MIN: HCPCS

## 2024-03-27 PROCEDURE — G0299 HHS/HOSPICE OF RN EA 15 MIN: HCPCS

## 2024-03-29 ENCOUNTER — HOME CARE VISIT (OUTPATIENT)
Dept: HOME HEALTH SERVICES | Facility: HOME HEALTHCARE | Age: 89
End: 2024-03-29
Payer: MEDICARE

## 2024-03-29 PROCEDURE — G0156 HHCP-SVS OF AIDE,EA 15 MIN: HCPCS

## 2024-04-04 ENCOUNTER — HOME CARE VISIT (OUTPATIENT)
Dept: HOME HOSPICE | Facility: HOSPICE | Age: 89
End: 2024-04-04
Payer: MEDICARE

## 2024-04-11 ENCOUNTER — HOME CARE VISIT (OUTPATIENT)
Dept: HOME HOSPICE | Facility: HOSPICE | Age: 89
End: 2024-04-11
Payer: MEDICARE